# Patient Record
Sex: MALE | Race: WHITE
[De-identification: names, ages, dates, MRNs, and addresses within clinical notes are randomized per-mention and may not be internally consistent; named-entity substitution may affect disease eponyms.]

---

## 2017-01-21 LAB — PSA SERPL-MCNC: 4.3

## 2017-02-06 ENCOUNTER — APPOINTMENT (OUTPATIENT)
Dept: UROLOGY | Facility: CLINIC | Age: 62
End: 2017-02-06

## 2017-02-06 VITALS — SYSTOLIC BLOOD PRESSURE: 118 MMHG | DIASTOLIC BLOOD PRESSURE: 78 MMHG | HEART RATE: 83 BPM | TEMPERATURE: 98.1 F

## 2017-02-06 DIAGNOSIS — E29.1 TESTICULAR HYPOFUNCTION: ICD-10-CM

## 2017-02-11 ENCOUNTER — INPATIENT (INPATIENT)
Facility: HOSPITAL | Age: 62
LOS: 1 days | Discharge: HOME | End: 2017-02-13
Attending: FAMILY MEDICINE | Admitting: INTERNAL MEDICINE
Payer: COMMERCIAL

## 2017-02-11 VITALS
HEART RATE: 81 BPM | TEMPERATURE: 98 F | SYSTOLIC BLOOD PRESSURE: 145 MMHG | DIASTOLIC BLOOD PRESSURE: 97 MMHG | OXYGEN SATURATION: 98 % | RESPIRATION RATE: 18 BRPM

## 2017-02-11 LAB
ALBUMIN SERPL ELPH-MCNC: 4 G/DL — SIGNIFICANT CHANGE UP (ref 3.3–5)
ALP SERPL-CCNC: 66 U/L — SIGNIFICANT CHANGE UP (ref 40–120)
ALT FLD-CCNC: 62 U/L — SIGNIFICANT CHANGE UP (ref 12–78)
ANION GAP SERPL CALC-SCNC: 8 MMOL/L — SIGNIFICANT CHANGE UP (ref 5–17)
APTT BLD: 32.5 SEC — SIGNIFICANT CHANGE UP (ref 27.5–37.4)
AST SERPL-CCNC: 57 U/L — HIGH (ref 15–37)
BASOPHILS # BLD AUTO: 0.1 K/UL — SIGNIFICANT CHANGE UP (ref 0–0.2)
BASOPHILS NFR BLD AUTO: 1.4 % — SIGNIFICANT CHANGE UP (ref 0–2)
BILIRUB SERPL-MCNC: 0.8 MG/DL — SIGNIFICANT CHANGE UP (ref 0.2–1.2)
BUN SERPL-MCNC: 14 MG/DL — SIGNIFICANT CHANGE UP (ref 7–23)
CALCIUM SERPL-MCNC: 9.3 MG/DL — SIGNIFICANT CHANGE UP (ref 8.5–10.1)
CHLORIDE SERPL-SCNC: 103 MMOL/L — SIGNIFICANT CHANGE UP (ref 96–108)
CK SERPL-CCNC: 767 U/L — HIGH (ref 26–308)
CO2 SERPL-SCNC: 29 MMOL/L — SIGNIFICANT CHANGE UP (ref 22–31)
CREAT SERPL-MCNC: 1.15 MG/DL — SIGNIFICANT CHANGE UP (ref 0.5–1.3)
EOSINOPHIL # BLD AUTO: 0.4 K/UL — SIGNIFICANT CHANGE UP (ref 0–0.5)
EOSINOPHIL NFR BLD AUTO: 4.5 % — SIGNIFICANT CHANGE UP (ref 0–6)
GLUCOSE SERPL-MCNC: 99 MG/DL — SIGNIFICANT CHANGE UP (ref 70–99)
HCT VFR BLD CALC: 47.1 % — SIGNIFICANT CHANGE UP (ref 39–50)
HGB BLD-MCNC: 16.7 G/DL — SIGNIFICANT CHANGE UP (ref 13–17)
INR BLD: 1.18 RATIO — HIGH (ref 0.88–1.16)
LIDOCAIN IGE QN: 165 U/L — SIGNIFICANT CHANGE UP (ref 73–393)
LYMPHOCYTES # BLD AUTO: 2.5 K/UL — SIGNIFICANT CHANGE UP (ref 1–3.3)
LYMPHOCYTES # BLD AUTO: 27 % — SIGNIFICANT CHANGE UP (ref 13–44)
MANUAL DIF COMMENT BLD-IMP: SIGNIFICANT CHANGE UP
MCHC RBC-ENTMCNC: 30.8 PG — SIGNIFICANT CHANGE UP (ref 27–34)
MCHC RBC-ENTMCNC: 35.4 GM/DL — SIGNIFICANT CHANGE UP (ref 32–36)
MCV RBC AUTO: 87 FL — SIGNIFICANT CHANGE UP (ref 80–100)
MONOCYTES # BLD AUTO: 1.5 K/UL — HIGH (ref 0–0.9)
MONOCYTES NFR BLD AUTO: 16.4 % — HIGH (ref 2–14)
NEUTROPHILS # BLD AUTO: 4.7 K/UL — SIGNIFICANT CHANGE UP (ref 1.8–7.4)
NEUTROPHILS NFR BLD AUTO: 50.7 % — SIGNIFICANT CHANGE UP (ref 43–77)
NT-PROBNP SERPL-SCNC: 169 PG/ML — HIGH (ref 0–125)
PLAT MORPH BLD: NORMAL — SIGNIFICANT CHANGE UP
PLATELET # BLD AUTO: 222 K/UL — SIGNIFICANT CHANGE UP (ref 150–400)
POTASSIUM SERPL-MCNC: 3.8 MMOL/L — SIGNIFICANT CHANGE UP (ref 3.5–5.3)
POTASSIUM SERPL-SCNC: 3.8 MMOL/L — SIGNIFICANT CHANGE UP (ref 3.5–5.3)
PROT SERPL-MCNC: 7.5 GM/DL — SIGNIFICANT CHANGE UP (ref 6–8.3)
PROTHROM AB SERPL-ACNC: 13 SEC — SIGNIFICANT CHANGE UP (ref 10–13.1)
RBC # BLD: 5.42 M/UL — SIGNIFICANT CHANGE UP (ref 4.2–5.8)
RBC # FLD: 12 % — SIGNIFICANT CHANGE UP (ref 10.3–14.5)
RBC BLD AUTO: NORMAL — SIGNIFICANT CHANGE UP
SODIUM SERPL-SCNC: 140 MMOL/L — SIGNIFICANT CHANGE UP (ref 135–145)
TROPONIN I SERPL-MCNC: <0.015 NG/ML — SIGNIFICANT CHANGE UP (ref 0.01–0.04)
WBC # BLD: 9.3 K/UL — SIGNIFICANT CHANGE UP (ref 3.8–10.5)
WBC # FLD AUTO: 9.3 K/UL — SIGNIFICANT CHANGE UP (ref 3.8–10.5)

## 2017-02-11 PROCEDURE — 99285 EMERGENCY DEPT VISIT HI MDM: CPT

## 2017-02-11 PROCEDURE — 71020: CPT | Mod: 26

## 2017-02-11 PROCEDURE — 93010 ELECTROCARDIOGRAM REPORT: CPT

## 2017-02-11 RX ORDER — SODIUM CHLORIDE 9 MG/ML
3 INJECTION INTRAMUSCULAR; INTRAVENOUS; SUBCUTANEOUS ONCE
Qty: 0 | Refills: 0 | Status: COMPLETED | OUTPATIENT
Start: 2017-02-11 | End: 2017-02-11

## 2017-02-11 RX ORDER — ASPIRIN/CALCIUM CARB/MAGNESIUM 324 MG
325 TABLET ORAL ONCE
Qty: 0 | Refills: 0 | Status: COMPLETED | OUTPATIENT
Start: 2017-02-11 | End: 2017-02-11

## 2017-02-11 RX ORDER — NITROGLYCERIN 6.5 MG
0.5 CAPSULE, EXTENDED RELEASE ORAL ONCE
Qty: 0 | Refills: 0 | Status: COMPLETED | OUTPATIENT
Start: 2017-02-11 | End: 2017-02-11

## 2017-02-11 RX ADMIN — SODIUM CHLORIDE 3 MILLILITER(S): 9 INJECTION INTRAMUSCULAR; INTRAVENOUS; SUBCUTANEOUS at 23:24

## 2017-02-11 RX ADMIN — Medication 325 MILLIGRAM(S): at 23:24

## 2017-02-11 NOTE — ED STATDOCS - DETAILS:
I, Gloria Grady, performed the initial face to face bedside interview with this patient regarding history of present illness, review of symptoms and relevant past medical, social and family history.  I completed an independent physical examination.  I was the initial provider who evaluated this patient. I have signed out the follow up of any pending tests (i.e. labs, radiological studies) to the ACP.  I have communicated the patient’s plan of care and disposition with the ACP Gadit.  The history, relevant review of systems, past medical and surgical history, medical decision making, and physical examination was documented by the scribe in my presence and I attest to the accuracy of the documentation.

## 2017-02-11 NOTE — ED STATDOCS - PROGRESS NOTE DETAILS
After speaking again with Hospitalist Dr. Holder, heparin drip will not be initiated unless there is recurrence of chest pain or active chest pain or lab abnormality. HALIE Xiao reviewed the H&P and plan by Dr. Grady.    PA NOTE: Pt seen by intake physician and hpi/orders/plan reviewed. PT presenting to ED with complaints of chest pain. Pt had negative stress test last week at Dr. Seo's office.   PE: GEN: Awake, alert,  NAD,  EYES: PERRL CARDIAC: Reg rate and rhythm, S1,S2, RRR  RESP: No distress noted. Lungs CTA bilaterally no wheeze, ronchi, rales. ABD: soft,  non-tender, no guarding. . NEURO: AOx3, no focal deficits   PLAN: labs/EKG/CXR and admit. Pt aware of the admission.

## 2017-02-11 NOTE — ED STATDOCS - CHPI ED SYMPTOM NEG
no vomiting/no syncope/no nausea/no fever/no back pain/no diaphoresis/no palpitations/no dizziness/no shortness of breath

## 2017-02-11 NOTE — ED ADULT NURSE NOTE - ED STAT RN HANDOFF DETAILS
bedside report received by NINI Vick.  Pt is AAOx4 , currently denies chest pain.  Awaiting telemetry orders.

## 2017-02-11 NOTE — ED ADULT NURSE REASSESSMENT NOTE - NS ED NURSE REASSESS COMMENT FT1
Pt is an A&O x 4 male presents to ED c/o "chest heaviness", states he is scheduled for cardiac stents in 3 weeks. Placed on cardiac monitor. EKG completed. SL initiated, labs drawn. Pt appears in NAD. Color good. Sent for xray. Will continue to monitor

## 2017-02-11 NOTE — ED ADULT NURSE NOTE - CHPI ED SYMPTOMS NEG
no cough/no diaphoresis/no dizziness/no nausea/no vomiting/no chills/no fever/no shortness of breath/no syncope/no back pain

## 2017-02-11 NOTE — ED STATDOCS - OBJECTIVE STATEMENT
60 y/o M with hx of HTN on metoprolol and amlodipine presents to ED for evaluation of chest pain. Pt reports recent stress test by Dr Seo last week who wanted to schedule pt for angiogram and possible cardiac stent. Pt was instructed to come to ED if he experiences any symptoms. Pt reports shoveling snow yesterday. He took a nap earlier today and when he woke up, he noted chest pain. Pt reports URI symptoms including cough and congestion since January. No calf swelling/ pain. No recent travel. 60 y/o M with hx of HTN on metoprolol and amlodipine presents to ED for evaluation of chest pain. Pt reports recent abnormal stress test by Dr Seo last week who wanted to schedule pt for angiogram and possible cardiac stent. Pt was instructed to come to ED if he experiences any symptoms. He took a nap earlier today and when he woke up, he noted chest discomfort. Pt reports shoveling snow yesterday. Pt reports URI symptoms including cough and congestion since January. No calf swelling/ pain. No recent travel.

## 2017-02-12 DIAGNOSIS — I10 ESSENTIAL (PRIMARY) HYPERTENSION: ICD-10-CM

## 2017-02-12 DIAGNOSIS — Z90.49 ACQUIRED ABSENCE OF OTHER SPECIFIED PARTS OF DIGESTIVE TRACT: Chronic | ICD-10-CM

## 2017-02-12 DIAGNOSIS — Z98.890 OTHER SPECIFIED POSTPROCEDURAL STATES: Chronic | ICD-10-CM

## 2017-02-12 DIAGNOSIS — I20.0 UNSTABLE ANGINA: ICD-10-CM

## 2017-02-12 LAB
ALBUMIN SERPL ELPH-MCNC: 3.9 G/DL — SIGNIFICANT CHANGE UP (ref 3.3–5)
ALP SERPL-CCNC: 56 U/L — SIGNIFICANT CHANGE UP (ref 40–120)
ALT FLD-CCNC: 61 U/L — SIGNIFICANT CHANGE UP (ref 12–78)
ANION GAP SERPL CALC-SCNC: 9 MMOL/L — SIGNIFICANT CHANGE UP (ref 5–17)
AST SERPL-CCNC: 49 U/L — HIGH (ref 15–37)
BILIRUB SERPL-MCNC: 1 MG/DL — SIGNIFICANT CHANGE UP (ref 0.2–1.2)
BUN SERPL-MCNC: 14 MG/DL — SIGNIFICANT CHANGE UP (ref 7–23)
CALCIUM SERPL-MCNC: 8.9 MG/DL — SIGNIFICANT CHANGE UP (ref 8.5–10.1)
CHLORIDE SERPL-SCNC: 104 MMOL/L — SIGNIFICANT CHANGE UP (ref 96–108)
CHOLEST SERPL-MCNC: 130 MG/DL — SIGNIFICANT CHANGE UP (ref 10–199)
CK SERPL-CCNC: 633 U/L — HIGH (ref 26–308)
CO2 SERPL-SCNC: 29 MMOL/L — SIGNIFICANT CHANGE UP (ref 22–31)
CREAT SERPL-MCNC: 1.06 MG/DL — SIGNIFICANT CHANGE UP (ref 0.5–1.3)
GLUCOSE SERPL-MCNC: 84 MG/DL — SIGNIFICANT CHANGE UP (ref 70–99)
HDLC SERPL-MCNC: 30 MG/DL — LOW (ref 40–125)
LIPID PNL WITH DIRECT LDL SERPL: 80 MG/DL — SIGNIFICANT CHANGE UP
MAGNESIUM SERPL-MCNC: 2.3 MG/DL — SIGNIFICANT CHANGE UP (ref 1.8–2.4)
PHOSPHATE SERPL-MCNC: 2.6 MG/DL — SIGNIFICANT CHANGE UP (ref 2.5–4.5)
POTASSIUM SERPL-MCNC: 3.5 MMOL/L — SIGNIFICANT CHANGE UP (ref 3.5–5.3)
POTASSIUM SERPL-SCNC: 3.5 MMOL/L — SIGNIFICANT CHANGE UP (ref 3.5–5.3)
PROT SERPL-MCNC: 7.4 GM/DL — SIGNIFICANT CHANGE UP (ref 6–8.3)
SODIUM SERPL-SCNC: 142 MMOL/L — SIGNIFICANT CHANGE UP (ref 135–145)
TOTAL CHOLESTEROL/HDL RATIO MEASUREMENT: 4.3 RATIO — SIGNIFICANT CHANGE UP (ref 3.4–9.6)
TRIGL SERPL-MCNC: 100 MG/DL — SIGNIFICANT CHANGE UP (ref 10–149)
TROPONIN I SERPL-MCNC: <0.015 NG/ML — SIGNIFICANT CHANGE UP (ref 0.01–0.04)

## 2017-02-12 PROCEDURE — 93010 ELECTROCARDIOGRAM REPORT: CPT

## 2017-02-12 RX ORDER — CLOPIDOGREL BISULFATE 75 MG/1
75 TABLET, FILM COATED ORAL DAILY
Qty: 0 | Refills: 0 | Status: DISCONTINUED | OUTPATIENT
Start: 2017-02-12 | End: 2017-02-13

## 2017-02-12 RX ORDER — METOPROLOL TARTRATE 50 MG
100 TABLET ORAL
Qty: 0 | Refills: 0 | Status: DISCONTINUED | OUTPATIENT
Start: 2017-02-12 | End: 2017-02-13

## 2017-02-12 RX ORDER — AMLODIPINE BESYLATE 2.5 MG/1
5 TABLET ORAL DAILY
Qty: 0 | Refills: 0 | Status: DISCONTINUED | OUTPATIENT
Start: 2017-02-12 | End: 2017-02-13

## 2017-02-12 RX ORDER — CLOPIDOGREL BISULFATE 75 MG/1
300 TABLET, FILM COATED ORAL ONCE
Qty: 0 | Refills: 0 | Status: COMPLETED | OUTPATIENT
Start: 2017-02-12 | End: 2017-02-12

## 2017-02-12 RX ORDER — ASPIRIN/CALCIUM CARB/MAGNESIUM 324 MG
325 TABLET ORAL DAILY
Qty: 0 | Refills: 0 | Status: DISCONTINUED | OUTPATIENT
Start: 2017-02-12 | End: 2017-02-13

## 2017-02-12 RX ORDER — ATORVASTATIN CALCIUM 80 MG/1
80 TABLET, FILM COATED ORAL AT BEDTIME
Qty: 0 | Refills: 0 | Status: DISCONTINUED | OUTPATIENT
Start: 2017-02-12 | End: 2017-02-13

## 2017-02-12 RX ORDER — TRIAMTERENE/HYDROCHLOROTHIAZID 75 MG-50MG
1 TABLET ORAL DAILY
Qty: 0 | Refills: 0 | Status: DISCONTINUED | OUTPATIENT
Start: 2017-02-12 | End: 2017-02-13

## 2017-02-12 RX ADMIN — CLOPIDOGREL BISULFATE 300 MILLIGRAM(S): 75 TABLET, FILM COATED ORAL at 04:10

## 2017-02-12 RX ADMIN — Medication 1 TABLET(S): at 06:22

## 2017-02-12 RX ADMIN — ATORVASTATIN CALCIUM 80 MILLIGRAM(S): 80 TABLET, FILM COATED ORAL at 04:10

## 2017-02-12 RX ADMIN — Medication 100 MILLIGRAM(S): at 06:21

## 2017-02-12 RX ADMIN — Medication 325 MILLIGRAM(S): at 11:23

## 2017-02-12 RX ADMIN — Medication 100 MILLIGRAM(S): at 17:43

## 2017-02-12 RX ADMIN — CLOPIDOGREL BISULFATE 75 MILLIGRAM(S): 75 TABLET, FILM COATED ORAL at 11:23

## 2017-02-12 RX ADMIN — AMLODIPINE BESYLATE 5 MILLIGRAM(S): 2.5 TABLET ORAL at 06:06

## 2017-02-12 RX ADMIN — ATORVASTATIN CALCIUM 80 MILLIGRAM(S): 80 TABLET, FILM COATED ORAL at 22:26

## 2017-02-12 NOTE — CONSULT NOTE ADULT - ASSESSMENT
SOB-With recent abnormal stress test,  For tenative left heart cath tomorrow. Awaiting Dr Seo's input. NPO PMN.    HTN- continue current meds.  Thank you for allowing me to participate in the care of this patient. Please feel free to contact me with any questions.

## 2017-02-12 NOTE — PROGRESS NOTE ADULT - PROBLEM SELECTOR PLAN 1
- Continue Telemetry  - Cardiology consult with Dr. Seo, discussed with Dr. Contreras as she was covering  - NPO after midnight for possible Cath  -neg trops x3   - EKG: T wave inversions, No ST changes  - ASA, Plavix, Statin, BB  - CPK: 767  - 1st Trop <0.015  - Trend troponin and serial ekg  check lipids

## 2017-02-12 NOTE — PROGRESS NOTE ADULT - PROBLEM SELECTOR PLAN 2
Cont Metoprolol 100mg PO BID  Cont Amlodipine 5mg PO daily  Cont Dyazide 37.5/25mg daily  Monitor BP.

## 2017-02-12 NOTE — PATIENT PROFILE ADULT. - PSH
History of cholecystectomy    History of inguinal hernia repair    S/P partial colectomy  for diverticulitis

## 2017-02-12 NOTE — H&P ADULT - HISTORY OF PRESENT ILLNESS
62 YO M with PMHx of HTN, presenting with "chest tightness".  Patient reports that he was shoveling snow, then he came home and took a nap.  He woke up feeling strange and felt some chest tightness that felt unusual to him.  He recently had seen Dr. Seo for stress test that was abnormal last week and was supposed to see Dr. Seo on Monday to discuss the need for angio with Cath and stent placement.  Patient was advised to come to the ED if he had felt anything abnormal. Patient denies shortness of breath but does report UTI symptoms since new years which have been improving.  Denies palpiations, jaw or arm pain/numbness, abdominal pain, headache.      In the ED, patient received ASA 325mg x1, Nitro Ointment x1.     2/12 - Patient seen and examined in the ED.  Patient is comfortable, NAD.  He does not have anymore chest tightness feeling.  He does not have any complaints at this time. 60 YO M with PMHx of HTN, presenting with "chest tightness".  Patient reports that he was shoveling snow yesterday with no discomfort, today he came home and took a nap.  When he woke up, he reports feeling strange and felt some chest tightness that felt unusual to him. Patient cannot specify how long the sensation lasted. He recently had seen Dr. Seo for stress test that was abnormal last week and was supposed to see Dr. Seo on Monday to discuss the need for angio with Cath and stent placement.  Patient was advised to come to the ED if he had felt anything abnormal. Patient denies shortness of breath but does report UTI symptoms since new years which have been improving.  Denies palpiations, jaw or arm pain/numbness, abdominal pain, headache.      In the ED, patient received ASA 325mg x1, Nitro Ointment x1.     2/12 - Patient seen and examined in the ED.  Patient is comfortable, NAD.  He does not have anymore chest tightness feeling.  He does not have any complaints at this time. 62 YO M with PMHx of HTN, presenting with "chest tightness".      Patient reports that he was shoveling snow yesterday with no discomfort, today he came home and took a nap.  When he woke up, he reports feeling strange and felt some chest tightness that felt unusual to him. Patient cannot specify how long the sensation lasted.  Graded as 1/10  cannot tell me what increases or decreases this discomfort.  Cannot tell me how long the sensation lasted. + associated mild SOB   No radiation , no palpitations and no diaphoresis  .  He recently had seen Dr. Seo for stress test that was abnormal last week ( persantine stress) and was supposed to see Dr. Seo on Monday to discuss the need for angio with Cath and stent placement.  Patient was advised to come to the ED if he had felt anything abnormal. Patient denies shortness of breath but does report URI symptoms since new years which have been improving.  Denies palpitations, jaw or arm pain/numbness, abdominal pain, headache.      In the ED, patient received ASA 325mg x1, Nitro Ointment x1.     2/12 - Patient seen and examined in the ED.  Patient is comfortable, NAD.  He does not have anymore chest tightness feeling.  He does not have any complaints at this time.

## 2017-02-12 NOTE — PROGRESS NOTE ADULT - PROBLEM SELECTOR PLAN 2
.  - Cont Metoprolol 100mg PO BID  - Cont Amlodipine 5mg PO daily  - Cont Dyazide 37.5/25mg daily  - Monitor BP

## 2017-02-12 NOTE — H&P ADULT - PROBLEM SELECTOR PLAN 1
- Admit to Telemetry  - Cardiology consult with Dr. Seo  - NPO after midnight for possible Cath  - EKG: T wave inversions, No ST changes  - ASA, Plavix, Statin, BB  - CPK: 767  - 1st Trop <0.015  - Trend troponin .  - Admit to Telemetry  - Cardiology consult with Dr. Seo  - NPO after midnight for possible Cath  - EKG: T wave inversions, No ST changes  - ASA, Plavix, Statin, BB  - CPK: 767  - 1st Trop <0.015  - Trend troponin .  - Admit to Telemetry  - Cardiology consult with Dr. Seo  - NPO after midnight for possible Cath  - EKG: T wave inversions, No ST changes  - ASA, Plavix, Statin, BB  - CPK: 767  - 1st Trop <0.015  - Trend troponin and serial ekg  check lipids

## 2017-02-12 NOTE — H&P ADULT - NSHPSOCIALHISTORY_GEN_ALL_CORE
Former smoker 1PPD for 5 years, quit long time ago.    Social drinker.  No Drugs. Former smoker 1PPD for 5 years, quit long time ago.    Social drinker.  No Drugs.    works on airplanes at the [employer]

## 2017-02-12 NOTE — H&P ADULT - NSHPLABSRESULTS_GEN_ALL_CORE
EKG NSR no acute ST-T changes when I compared to ekgs 2013 and 2012    CXR PA and lat  normal heart size and JAMES (unofficial)

## 2017-02-12 NOTE — PATIENT PROFILE ADULT. - FAMILY HISTORY
Father  Still living? No  Family history of cardiac disorder in father, Age at diagnosis: 51-60  Family history of diabetes mellitus in father, Age at diagnosis: Age Unknown

## 2017-02-12 NOTE — H&P ADULT - ATTENDING COMMENTS
62 YO M with PMHx of HTN, presenting with "chest tightness".      +shoveling snow yesterday with no discomfort, today he came home and took a nap.  When he woke up, felt some chest tightness that felt unusual to him. Patient cannot specify how long the sensation lasted.  Graded as 1/10  cannot tell me what increases or decreases this discomfort.  Cannot tell me how long the sensation lasted. + associated mild SOB   No radiation , no palpitations and no diaphoresis  Aware of + persantine stress recently.  ROS as above  Hemodynamics as indicated in PE  Pt appears comfortable  HEENT no asym, pupils reactive  Neck supple, no JVD  Chest clear = BS   Cor RR S1+S2   Ab + BS soft  extrem no edema.   skin warm and dry    labs, ekg and CXR reviewed w Dr. Montes.  Agree w A/P as outlined.  Will likely need cath to define anatomy.

## 2017-02-12 NOTE — H&P ADULT - FAMILY HISTORY
Father  Still living? No  Family history of cardiac disorder in father, Age at diagnosis: Age Unknown  Family history of diabetes mellitus in father, Age at diagnosis: Age Unknown Father  Still living? No  Family history of cardiac disorder in father, Age at diagnosis: 51-60  Family history of diabetes mellitus in father, Age at diagnosis: Age Unknown

## 2017-02-12 NOTE — PROGRESS NOTE ADULT - PROBLEM SELECTOR PLAN 1
Continue Telemetry  Cardiology consult with Dr. Seo  NPO after midnight for possible Cath  -neg trops x3   ASA, Plavix, Statin, BB  CPK: trending down

## 2017-02-12 NOTE — H&P ADULT - PROBLEM SELECTOR PLAN 2
- Cont Metoprolol 100mg PO BID  - Cont Amlodipine 5mg PO daily  - Cont Dyazide 37.5/25mg daily  - Monitor BP .  - Cont Metoprolol 100mg PO BID  - Cont Amlodipine 5mg PO daily  - Cont Dyazide 37.5/25mg daily  - Monitor BP

## 2017-02-12 NOTE — CONSULT NOTE ADULT - SUBJECTIVE AND OBJECTIVE BOX
Patient is a 61y old  Male who presents with a chief complaint of chest tightness while shoveling snow (2017 05:25)      HPI:  60 YO M with PMHx of HTN, presenting with "chest tightness".      Patient reports that he was shoveling snow the day before with no discomfort, and he came home and took a nap.  When he woke up, he reports feeling strange and felt some chest tightness that felt unusual to him. Patient cannot specify how long the sensation lasted.  Graded as 1/10  cannot tell me what increases or decreases this discomfort.  Cannot tell me how long the sensation lasted. + associated mild SOB   No radiation , no palpitations and no diaphoresis  .  He recently had seen Dr. Seo for stress test that was abnormal last week ( persantine stress) and was supposed to see Dr. Seo on Monday to discuss the need for angio with Cath and stent placement.  Patient was advised to come to the ED if he had felt anything abnormal. Patient denies shortness of breath but does report URI symptoms since new years which have been improving.  Denies palpitations, jaw or arm pain/numbness, abdominal pain, headache.      In the ED, patient received ASA 325mg x1, Nitro Ointment x1.      - Pt denies any recurrence of his symptoms.      PAST MEDICAL & SURGICAL HISTORY:  Diverticulitis  HTN (hypertension)  History of inguinal hernia repair  S/P partial colectomy: for diverticulitis  History of cholecystectomy          MEDICATIONS  (STANDING):  triamterene 37.5 mG/hydrochlorothiazide 25 mG Tablet 1Tablet(s) Oral daily  metoprolol 100milliGRAM(s) Oral two times a day  amLODIPine   Tablet 5milliGRAM(s) Oral daily  aspirin 325milliGRAM(s) Oral daily  clopidogrel Tablet 75milliGRAM(s) Oral daily  atorvastatin 80milliGRAM(s) Oral at bedtime    MEDICATIONS  (PRN):      FAMILY HISTORY:  Family history of diabetes mellitus in father (Father)  Family history of cardiac disorder in father (Father):  age 58      SOCIAL HISTORY:  quit smoking many yrs ago    CIGARETTES:    ALCOHOL:    REVIEW OF SYSTEMS:  CONSTITUTIONAL:  No night sweats.  No fatigue, malaise, lethargy.  No fever or chills.  HEENT:  Eyes:  No visual changes.  No eye pain.  No eye discharge.    ENT:  No runny nose.  No epistaxis.  No sinus pain.  No sore throat.  No odynophagia.  No ear pain.  No congestion.  RESPIRATORY:  No cough.  No wheeze.  No hemoptysis.  No shortness of breath.  CARDIOVASCULAR:  No chest pains.  No palpitations. No shortness of breath, orthopnea or PND.  GASTROINTESTINAL:  No abdominal pain.  No nausea or vomiting.  No diarrhea or constipation.  No hematemesis.  No hematochezia.  No melena.  GENITOURINARY:  No urgency.  No frequency.  No dysuria.  No hematuria.  No obstructive symptoms.  No discharge.  No pain.  No significant abnormal bleeding.  MUSCULOSKELETAL:  No musculoskeletal pain.  No joint swelling.  No arthritis.  NEUROLOGICAL:  No tingling or numbness or weakness.  PSYCHIATRIC:  No confusion  SKIN:  No rashes.  No lesions.  No wounds.  ENDOCRINE:  No unexplained weight loss.  No polydipsia.  No polyuria.  No polyphagia.  HEMATOLOGIC:  No anemia.  No purpura.  No petechiae.  No prolonged or excessive bleeding.   ALLERGIC AND IMMUNOLOGIC:  No pruritus.  No swelling.         Vital Signs Last 24 Hrs  T(C): 36.2, Max: 36.8 (02-12 @ 03:58)  T(F): 97.2, Max: 98.2 (02-12 @ 03:58)  HR: 70 (67 - 81)  BP: 130/91 (107/77 - 145/97)  BP(mean): --  RR: 16 (16 - 18)  SpO2: 96% (96% - 100%)    PHYSICAL EXAM-    Constitutional: The patient appears to be normal, well developed, well nourished and alert and oriented to time, place and person. The patient does not appear acutely ill. The patient is alert.     Head: Head is normocephalic and atraumatic.      Neck: The patient's neck is supple without enlargement, has no palpable thyromegaly nor thyroid nodules and has no jugular venous distention. No audible carotid bruits. There are strong carotid pulses bilaterally. No JVD.     Cardiovascular: Regular rate and rhythm without S3, S4. No murmurs or rubs are appreciated.      Respiratory: The breath sounds in the left lung field are diminished through out. The breath sounds in the right lung field are diminished through out. The patient has no rales and no rhonchi. The patient has no wheezes.     Abdomen: Soft, nontender, nondistended with positive bowel sounds.      Extremity: No tenderness. There is no pitting edema, skin discoloration, clubbing and cyanosis.     Neurologic: The patient is alert and oriented.      Skin: No rash, no obvious lesions noted.      Psychiatric: The patient appears to be emotionally stable.      INTERPRETATION OF TELEMETRY:    ECG:  sinus rythm, L axis, no st t changes, normal intervals.  I&O's Detail      LABS:                        16.7   9.3   )-----------( 222      ( 2017 21:50 )             47.1     2017 08:21    142    |  104    |  14     ----------------------------<  84     3.5     |  29     |  1.06     Ca    8.9        2017 08:21  Phos  2.6       2017 08:21  Mg     2.3       2017 08:21    TPro  7.4    /  Alb  3.9    /  TBili  1.0    /  DBili  x      /  AST  49     /  ALT  61     /  AlkPhos  56     2017 08:21    CARDIAC MARKERS ( 2017 08:21 )  <0.015 ng/mL / x     / 465 U/L / x     / x      CARDIAC MARKERS ( 2017 01:24 )  <0.015 ng/mL / x     / 633 U/L / x     / x      CARDIAC MARKERS ( 2017 21:50 )  <0.015 ng/mL / x     / 767 U/L / x     / x          PT/INR - ( 2017 21:50 )   PT: 13.0 sec;   INR: 1.18 ratio         PTT - ( 2017 21:50 )  PTT:32.5 sec    I&O's Summary    BNPSerum Pro-Brain Natriuretic Peptide: 169 pg/mL ( @ 21:50)    RADIOLOGY & ADDITIONAL STUDIES:

## 2017-02-12 NOTE — H&P ADULT - PSH
History of cholecystectomy    S/P partial colectomy  for diverticulitis History of cholecystectomy    History of inguinal hernia repair    S/P partial colectomy  for diverticulitis

## 2017-02-12 NOTE — H&P ADULT - NSHPPHYSICALEXAM_GEN_ALL_CORE
Vital Signs Last 24 Hrs  T(C): 36.5, Max: 36.5 (02-11 @ 21:06)  T(F): 97.7, Max: 97.7 (02-11 @ 21:06)  HR: 69 (69 - 81)  BP: 107/77 (107/77 - 145/97)  BP(mean): --  RR: 18 (18 - 18)  SpO2: 97% (97% - 98%)Vital Signs Last 24 Hrs  T(C): 36.5, Max: 36.5 (02-11 @ 21:06)

## 2017-02-13 ENCOUNTER — TRANSCRIPTION ENCOUNTER (OUTPATIENT)
Age: 62
End: 2017-02-13

## 2017-02-13 VITALS
HEART RATE: 65 BPM | RESPIRATION RATE: 18 BRPM | DIASTOLIC BLOOD PRESSURE: 80 MMHG | SYSTOLIC BLOOD PRESSURE: 124 MMHG | OXYGEN SATURATION: 96 % | WEIGHT: 210.1 LBS

## 2017-02-13 LAB
ANION GAP SERPL CALC-SCNC: 6 MMOL/L — SIGNIFICANT CHANGE UP (ref 5–17)
BUN SERPL-MCNC: 15 MG/DL — SIGNIFICANT CHANGE UP (ref 7–23)
CALCIUM SERPL-MCNC: 8.9 MG/DL — SIGNIFICANT CHANGE UP (ref 8.5–10.1)
CHLORIDE SERPL-SCNC: 104 MMOL/L — SIGNIFICANT CHANGE UP (ref 96–108)
CO2 SERPL-SCNC: 31 MMOL/L — SIGNIFICANT CHANGE UP (ref 22–31)
CREAT SERPL-MCNC: 0.98 MG/DL — SIGNIFICANT CHANGE UP (ref 0.5–1.3)
GLUCOSE SERPL-MCNC: 92 MG/DL — SIGNIFICANT CHANGE UP (ref 70–99)
HCT VFR BLD CALC: 49.7 % — SIGNIFICANT CHANGE UP (ref 39–50)
HGB BLD-MCNC: 17.2 G/DL — HIGH (ref 13–17)
MCHC RBC-ENTMCNC: 30.5 PG — SIGNIFICANT CHANGE UP (ref 27–34)
MCHC RBC-ENTMCNC: 34.6 GM/DL — SIGNIFICANT CHANGE UP (ref 32–36)
MCV RBC AUTO: 88.2 FL — SIGNIFICANT CHANGE UP (ref 80–100)
PLATELET # BLD AUTO: 192 K/UL — SIGNIFICANT CHANGE UP (ref 150–400)
POTASSIUM SERPL-MCNC: 4.2 MMOL/L — SIGNIFICANT CHANGE UP (ref 3.5–5.3)
POTASSIUM SERPL-SCNC: 4.2 MMOL/L — SIGNIFICANT CHANGE UP (ref 3.5–5.3)
RBC # BLD: 5.63 M/UL — SIGNIFICANT CHANGE UP (ref 4.2–5.8)
RBC # FLD: 12.2 % — SIGNIFICANT CHANGE UP (ref 10.3–14.5)
SODIUM SERPL-SCNC: 141 MMOL/L — SIGNIFICANT CHANGE UP (ref 135–145)
WBC # BLD: 8.2 K/UL — SIGNIFICANT CHANGE UP (ref 3.8–10.5)
WBC # FLD AUTO: 8.2 K/UL — SIGNIFICANT CHANGE UP (ref 3.8–10.5)

## 2017-02-13 RX ORDER — ASPIRIN/CALCIUM CARB/MAGNESIUM 324 MG
1 TABLET ORAL
Qty: 30 | Refills: 0
Start: 2017-02-13 | End: 2017-03-15

## 2017-02-13 RX ORDER — ROSUVASTATIN CALCIUM 5 MG/1
1 TABLET ORAL
Qty: 30 | Refills: 0
Start: 2017-02-13 | End: 2017-03-15

## 2017-02-13 RX ADMIN — Medication 100 MILLIGRAM(S): at 06:32

## 2017-02-13 RX ADMIN — AMLODIPINE BESYLATE 5 MILLIGRAM(S): 2.5 TABLET ORAL at 06:32

## 2017-02-13 RX ADMIN — Medication 325 MILLIGRAM(S): at 12:11

## 2017-02-13 RX ADMIN — CLOPIDOGREL BISULFATE 75 MILLIGRAM(S): 75 TABLET, FILM COATED ORAL at 12:11

## 2017-02-13 NOTE — DISCHARGE NOTE ADULT - CARE PLAN
Principal Discharge DX:	Unstable angina  Goal:	prevention of chest pain  Instructions for follow-up, activity and diet:	low sodium, low fat diet. Start ASA 81 mg daily and Crestor 5 mg qHS  f/u with cardiology in 1-2 weeks  Secondary Diagnosis:	Essential hypertension  Goal:	BP management  Instructions for follow-up, activity and diet:	continue home BP medications as previously taken

## 2017-02-13 NOTE — DISCHARGE NOTE ADULT - HOSPITAL COURSE
60 y/o M presented with chaest tightness after having a positive stress test outpatient. His chest pressure resolved. Pt had angio done which showed LC small OM1 with 75% distal stenosis with need for medical managment only. Pt was started on ASA, Statin, to continue BB and f/u with cardio in 1-2 weeks

## 2017-02-13 NOTE — PROGRESS NOTE ADULT - PROBLEM SELECTOR PLAN 1
-Cath performed today, no stents, Single vessel disease in smaller  Left circumflex  -Discharge home today on statin, asa, bb  -neg trops x3   ASA, Plavix, Statin, BB  CPK: trending down

## 2017-02-13 NOTE — DISCHARGE NOTE ADULT - CARE PROVIDER_API CALL
Jamel Seo (MOLLY), Cardiovascular Disease; Critical Care Medicine; Internal Medicine; Interventional Cardiology  [address] [place] [address]  Phone: [phone]  Fax: [phone]

## 2017-02-13 NOTE — DISCHARGE NOTE ADULT - MEDICATION SUMMARY - MEDICATIONS TO CHANGE
I will SWITCH the dose or number of times a day I take the medications listed below when I get home from the hospital:    Norvasc  --  by mouth    metoprolol  --  by mouth    Dyazide  --  by mouth

## 2017-02-13 NOTE — DISCHARGE NOTE ADULT - PATIENT PORTAL LINK FT
“You can access the [facility] Patient Portal, offered by [facility], by registering with the following website: [email or web address]”

## 2017-02-13 NOTE — DISCHARGE NOTE ADULT - MEDICATION SUMMARY - MEDICATIONS TO TAKE
I will START or STAY ON the medications listed below when I get home from the hospital:    Aspir 81 oral delayed release tablet  -- 1 tab(s) by mouth once a day  -- Swallow whole.  Do not crush.  Take with food or milk.    -- Indication: For CHEST PAIN    Crestor 5 mg oral tablet  -- 1 tab(s) by mouth once a day (at bedtime)  -- Do not take dairy products, antacids, or iron preparations within one hour of this medication.  Do not take this drug if you are pregnant.  It is very important that you take or use this exactly as directed.  Do not skip doses or discontinue unless directed by your doctor.    -- Indication: For CHEST PAIN    Dyazide  --  by mouth   -- Indication: For HTN (hypertension)    metoprolol  --  by mouth   -- Indication: For HTN (hypertension)    Norvasc  --  by mouth   -- Indication: For HTN (hypertension)

## 2017-02-13 NOTE — DISCHARGE NOTE ADULT - PLAN OF CARE
prevention of chest pain low sodium, low fat diet. Start ASA 81 mg daily and Crestor 5 mg qHS  f/u with cardiology in 1-2 weeks BP management continue home BP medications as previously taken

## 2017-02-13 NOTE — PROGRESS NOTE ADULT - ASSESSMENT
SOB-With recent abnormal stress test,  For tenative left heart cath tomorrow. Awaiting Dr Seo's input. NPO PMN.    HTN- continue current meds.  Thank you for allowing me to participate in the care of this patient. Please feel free to contact me with any questions. SOB-With recent abnormal stress test, pt for left heart cath today.    HTN- continue current meds.  Thank you for allowing me to participate in the care of this patient. Please feel free to contact me with any questions.

## 2017-02-13 NOTE — CHART NOTE - NSCHARTNOTEFT_GEN_A_CORE
Cardiology NP  Nurse Practitioner Progress note:     HPI:  60 YO M with PMHx of HTN, presenting with "chest tightness".      Patient reports that he was shoveling snow yesterday with no discomfort, he came home and took a nap.  When he woke up, he reported feeling strange and felt some chest tightness that felt unusual to him.  Graded as 1/10  cannot tell what increases or decreases  discomfort and is associated with mild SOB.     .  Pt. recently had seen Dr. Seo for stress test that was abnormal last week (persantine stress) and was supposed to see Dr. Seo on Monday to discuss the need for angio with Cath and stent placement.  Patient was advised to come to the ED if he had felt anything abnormal.       T(C): 36.2, Max: 36.9 (02-12 @ 16:12)  HR: 68 (68 - 72)  BP: 127/85 (109/67 - 128/69)  RR: 18 (16 - 18)  SpO2: 98% (93% - 100%)  Wt(kg): --    PHYSICAL EXAM:  Neurologic: Non-focal, AxOx3.  No neuro deficits  Vascular: Peripheral pulses palpable 2+ bilaterally  Procedure Site: Rt. groin closure device noted site benign soft no bleeding no hematoma +2PP      PROCEDURE RESULTS: S/P LHC non-obstructive CAD    ASSESSMENT/PLAN: 	  -VS, labs, diet, activity as per post cath orders  -IV hydration  -Encourage PO fluids  -Continue current medications  -Plan of care D/W pt. and MD  -Post cath instructions reviewed with pt., pt. verbalizes and understands instructions  -Follow-up with attending

## 2017-02-17 DIAGNOSIS — R07.89 OTHER CHEST PAIN: ICD-10-CM

## 2017-02-17 DIAGNOSIS — I10 ESSENTIAL (PRIMARY) HYPERTENSION: ICD-10-CM

## 2017-02-17 DIAGNOSIS — I20.0 UNSTABLE ANGINA: ICD-10-CM

## 2017-02-21 DIAGNOSIS — I25.110 ATHEROSCLEROTIC HEART DISEASE OF NATIVE CORONARY ARTERY WITH UNSTABLE ANGINA PECTORIS: ICD-10-CM

## 2017-03-06 ENCOUNTER — APPOINTMENT (OUTPATIENT)
Dept: UROLOGY | Facility: CLINIC | Age: 62
End: 2017-03-06

## 2017-03-10 ENCOUNTER — APPOINTMENT (OUTPATIENT)
Dept: UROLOGY | Facility: CLINIC | Age: 62
End: 2017-03-10

## 2017-03-16 PROBLEM — I10 ESSENTIAL (PRIMARY) HYPERTENSION: Chronic | Status: ACTIVE | Noted: 2017-02-11

## 2017-03-27 ENCOUNTER — APPOINTMENT (OUTPATIENT)
Dept: UROLOGY | Facility: CLINIC | Age: 62
End: 2017-03-27

## 2017-04-12 NOTE — ED STATDOCS - RESPIRATORY, MLM
REASON FOR VISIT:    Chief Complaint   Patient presents with   • Gastro-intestinal Problem     Extended Follow UP- Medication Check Crohn's        LAST VISIT WITH ME:  Visit date not found    HISTORY OF PRESENT ILLNESS:    The patient is a very pleasant 28 year old male who is being seen by GI in a follow-up for Crohn disease. Patient has not been seen by GI since late 2014. When inquired, he reports that he has been feeling rather fatigued and having loose stools daily for past several years. This is occurring despite the use of Humira q. Weekly. He also used to take Apriso in the past but he stopped taking this medication about 2 years ago as he did not find to be beneficial. Patient had no blood tests or any follow-up visits in this regard since the end of 2014. He denies any black stools or any hematemesis. States that his appetite is stable but he has up to 10 loose stools daily, occasionally presenting with mucus but he denies any rectal bleeding. Patient states that he is \"ready to get involved\" with his own healthcare as he feels that his Crohn disease is not adequately controlled at this point, nor that it has been well controlled for the past several years in fact. His last colonoscopy was completed in early summer of 2012. Patient avoids any frequent use of NSAIDs. He has a history of Crohn's perianal fistula and abscess which was surgically treated in 2013. He denies any urgency to evacuate his bowels or any extra intestinal symptoms of IBD such as joint pain or joint swelling or any uveitis. He denies any recent weight loss or fevers or chills or other constitutional signs except for generalized fatigue and malaise. He has no history of ulcerative colitis, microscopic colitis, IBS, pancreatitis, exocrine diuretic insufficiency, gastritis, or any GI cancer. He also denies any family history of GERD, pancreatitis, celiac disease, IBS, IBD, or colon cancer.    I have reviewed the patient's medications and  allergies, past medical, surgical, social and family history, updating these as appropriate.  See Histories section of the electronic medical record for a display of this information.    PAST MEDICAL HISTORY:    Crohn's disease                                                 Comment: perianal fistula asnd abscess    PAST SURGICAL HISTORY:    RECTUM SURGERY PROCEDURE UNLISTED               06/28/2013      Comment: recurrent perirectal abscess, prior fistula in                [place], [place] placed    ANAL FISTULOTOMY                                05/03/2013    COLONOSCOPY W BIOPSY                            06/07/2012    Current Outpatient Prescriptions   Medication Sig   • HUMIRA PEN 40 MG/0.8ML pen-injector kit INJECT 0.8 ML UNDER THE SKIN ONCE A WEEK   • HYDROcodone-acetaminophen (NORCO) 5-325 MG per tablet 1-2 tabs every 4-6hrs PRN foot and ankle pain.   • mesalamine (APRISO) 0.375 G 24 hr capsule Take 4 capsules by mouth every morning.     No current facility-administered medications for this visit.        ALLERGIES:   Allergen Reactions   • Seasonal Other (See Comments)     Stuffy nose, itchy red eyes,sore throat       Family History   Problem Relation Age of Onset   • NEGATIVE FAMILY HX OF Mother    • NEGATIVE FAMILY HX OF Father        Social History     Social History   • Marital status:      Spouse name: N/A   • Number of children: N/A   • Years of education: N/A     Occupational History   • Not on file.     Social History Main Topics   • Smoking status: Never Smoker   • Smokeless tobacco: Never Used   • Alcohol use No   • Drug use: No   • Sexual activity: Not on file     Other Topics Concern   • Not on file     Social History Narrative       REVIEW OF SYSTEMS:  A complete review of systems was performed and found to be negative except for what was stated in the History of Present Illness.    PHYSICAL EXAM:  Vitals:  See VS section.   Constitutional: Patient is alert and oriented x 3, pleasant and  cooperative, in mild acute distress.   Skin: No jaundice on inspection. Skin is pink, warm, and dry on palpation.   Eyes: Normal conjunctivae and lids, sclerae anicteric.  No uveitis.  Pulmonary: Clear to percussion and auscultation, no adventitious breath sounds. No use of accessory muscles.  Cardiovascular: Regular rate and rhythm on auscultation. Normal S1 and S2.  Abdomen: Soft, non-tender at this time, without any appreciable distention, normal bowel sounds X 4 quadrants. No hepatosplenomegaly or palpable abdominal masses.  Rectal: Deferred.  MS: Patient moving all extremities appropriately, no gait imbalance or ataxia. No joint pain or joint swelling.    ASSESSMENT: K50.90 Crohn disease    Patient is presenting in a follow-up for Crohn disease. It appears that his condition has not been stable and well controlled for quite a while despite the use of Humira weekly. Considering the patient has had Crohn disease for at least 10 years and his last colonoscopy was completed about 5 years ago, he will be scheduled for repeat colonoscopy at this junction and this procedure was reviewed with him in a great detail today. Moreover, his prescription for Humira has been refilled and he should continue taking this medication once daily and he will have several labs completed as well in order to evaluate for possibility of anemia or any other possible sequela/complications of inadequately treated Crohn's disease. Depending on his colonoscopy results, his treatment regimen may changed. Patient was meanwhile encouraged to continue following appropriate dietary measures and to avoid any NSAIDs. He will follow-up with GI in 2-3 months if his condition is stable but if he has any questions or concerns he should be seen at his earliest convenience.    PLAN:  Please continue taking all of your scheduled medications as prescribed.     Will repeat CBC, CMP, CRP, and ESR.     Will repeat colonoscopy at this time.      Avoid any  NSAIDs pain medications such as Ibuprofen or Naproxen as they may cause acute flareup of Crohn disease; may use Tylenol as a safe alternative.     Please follow-up with GI in 2-3 months if your condition is stable and if you have any questions or concerns or worsening of your symptoms you should follow-up at your earliest convenience.   breath sounds clear and equal bilaterally.

## 2017-04-17 ENCOUNTER — OUTPATIENT (OUTPATIENT)
Dept: OUTPATIENT SERVICES | Facility: HOSPITAL | Age: 62
LOS: 1 days | End: 2017-04-17
Payer: COMMERCIAL

## 2017-04-17 ENCOUNTER — APPOINTMENT (OUTPATIENT)
Dept: MRI IMAGING | Facility: CLINIC | Age: 62
End: 2017-04-17

## 2017-04-17 DIAGNOSIS — Z98.890 OTHER SPECIFIED POSTPROCEDURAL STATES: Chronic | ICD-10-CM

## 2017-04-17 DIAGNOSIS — Z90.49 ACQUIRED ABSENCE OF OTHER SPECIFIED PARTS OF DIGESTIVE TRACT: Chronic | ICD-10-CM

## 2017-04-17 DIAGNOSIS — Z00.8 ENCOUNTER FOR OTHER GENERAL EXAMINATION: ICD-10-CM

## 2017-04-17 PROCEDURE — 82565 ASSAY OF CREATININE: CPT

## 2017-04-17 PROCEDURE — 72197 MRI PELVIS W/O & W/DYE: CPT

## 2017-04-17 PROCEDURE — A9585: CPT

## 2018-10-22 ENCOUNTER — EMERGENCY (EMERGENCY)
Facility: HOSPITAL | Age: 63
LOS: 0 days | Discharge: HOME | End: 2018-10-22
Attending: EMERGENCY MEDICINE | Admitting: EMERGENCY MEDICINE
Payer: COMMERCIAL

## 2018-10-22 VITALS — HEIGHT: 72 IN | WEIGHT: 210.1 LBS

## 2018-10-22 VITALS
DIASTOLIC BLOOD PRESSURE: 99 MMHG | OXYGEN SATURATION: 98 % | SYSTOLIC BLOOD PRESSURE: 137 MMHG | HEART RATE: 73 BPM | RESPIRATION RATE: 17 BRPM | TEMPERATURE: 99 F

## 2018-10-22 DIAGNOSIS — I10 ESSENTIAL (PRIMARY) HYPERTENSION: ICD-10-CM

## 2018-10-22 DIAGNOSIS — Z90.49 ACQUIRED ABSENCE OF OTHER SPECIFIED PARTS OF DIGESTIVE TRACT: ICD-10-CM

## 2018-10-22 DIAGNOSIS — Z98.890 OTHER SPECIFIED POSTPROCEDURAL STATES: ICD-10-CM

## 2018-10-22 DIAGNOSIS — Z79.82 LONG TERM (CURRENT) USE OF ASPIRIN: ICD-10-CM

## 2018-10-22 DIAGNOSIS — L03.317 CELLULITIS OF BUTTOCK: ICD-10-CM

## 2018-10-22 DIAGNOSIS — Z98.890 OTHER SPECIFIED POSTPROCEDURAL STATES: Chronic | ICD-10-CM

## 2018-10-22 DIAGNOSIS — Z90.49 ACQUIRED ABSENCE OF OTHER SPECIFIED PARTS OF DIGESTIVE TRACT: Chronic | ICD-10-CM

## 2018-10-22 DIAGNOSIS — Z79.899 OTHER LONG TERM (CURRENT) DRUG THERAPY: ICD-10-CM

## 2018-10-22 DIAGNOSIS — Z87.19 PERSONAL HISTORY OF OTHER DISEASES OF THE DIGESTIVE SYSTEM: ICD-10-CM

## 2018-10-22 DIAGNOSIS — L02.31 CUTANEOUS ABSCESS OF BUTTOCK: ICD-10-CM

## 2018-10-22 PROBLEM — K57.92 DIVERTICULITIS OF INTESTINE, PART UNSPECIFIED, WITHOUT PERFORATION OR ABSCESS WITHOUT BLEEDING: Chronic | Status: ACTIVE | Noted: 2017-02-12

## 2018-10-22 PROCEDURE — 99283 EMERGENCY DEPT VISIT LOW MDM: CPT

## 2018-10-22 RX ORDER — IBUPROFEN 200 MG
1 TABLET ORAL
Qty: 30 | Refills: 0
Start: 2018-10-22

## 2018-10-22 NOTE — ED STATDOCS - PROGRESS NOTE DETAILS
63 y/o M with PMH of HTN, diverticulitis presents with abscess/cellulitis to left buttock. Pt states he was seen at outside urgent care 4 days ago and started on clindamycin at that time. He was told at that time there was nothing to drain. States he has been compliant with clindamycin since seen at urgent care. Has been using oxycodone for pain as well. He is unsure if he's getting any benefit from clindamycin. Denies experiencing fever, chills, nausea, vomiting, CP, SOB since starting clindamycin. PE: NAD. Cardiac: s1/s2, RRR. Lungs: CTAB. Abdomen: NBS x4, soft, nontender. noted lower midline abdominal scar with mild umbilical hernia. Skin: noted erythema over left buttock/sacrum which is indurated, without area of fluctuance. Area is not tracking towards rectum. A/P: cellulitis. Will add bactrim to clindamycin. Will provide surgical referral for eval following antibiotic therapy. - Afshin Hawley PA-C

## 2018-10-22 NOTE — ED ADULT TRIAGE NOTE - CHIEF COMPLAINT QUOTE
pt states he has left buttock abscess, has been on antibiotics/clindamycin and oxycodone for pain with no relief in symptoms

## 2018-10-22 NOTE — ED STATDOCS - OBJECTIVE STATEMENT
61 y/o M with a PMHx of HTN and Diverticulitis presenting to the ED c/o abscess on left buttocks. Reports that he was evaluated at Urgent Care four days ago for this issue. Physician at Urgent Care was going to perform an I&D but then noticed that there was no fluid to the area so I&D was nor performed. Pt was placed on Clindamycin at this time. He has been in compliance with Abx but came into ED today because pain to the abscess has persisted. Denies any fevers, chills, abd pain, N/V/D, HA, CP, or SOB. NKDA.

## 2019-02-12 ENCOUNTER — RX RENEWAL (OUTPATIENT)
Age: 64
End: 2019-02-12

## 2019-02-12 DIAGNOSIS — M76.60 ACHILLES TENDINITIS, UNSPECIFIED LEG: ICD-10-CM

## 2019-04-24 ENCOUNTER — RX RENEWAL (OUTPATIENT)
Age: 64
End: 2019-04-24

## 2019-05-14 ENCOUNTER — APPOINTMENT (OUTPATIENT)
Dept: UROLOGY | Facility: CLINIC | Age: 64
End: 2019-05-14
Payer: COMMERCIAL

## 2019-05-14 VITALS
DIASTOLIC BLOOD PRESSURE: 87 MMHG | HEIGHT: 72 IN | RESPIRATION RATE: 16 BRPM | BODY MASS INDEX: 28.44 KG/M2 | HEART RATE: 80 BPM | WEIGHT: 210 LBS | TEMPERATURE: 98.1 F | SYSTOLIC BLOOD PRESSURE: 152 MMHG | OXYGEN SATURATION: 98 %

## 2019-05-14 PROCEDURE — 99214 OFFICE O/P EST MOD 30 MIN: CPT

## 2019-05-15 NOTE — HISTORY OF PRESENT ILLNESS
[FreeTextEntry1] : 63 year old man seen 05/14/2019. He had been seen by Dr Tavera for hypogonadism, BPH, and elevated PSA. He is here for "second opinion" about his care.  He reports frequency, urgency, hesitancy, intermittency. He was given Rx for tamsulosin and finasteride by Dr Brett Seo. He has not started these meds yet. This began years ago. It is moderate in severity. Nothing makes the symptoms better, he thinks TRT made sx worse. It is associated with nothing. He had elevated PSA, see trend below. He refused prostate biopsy but agreed to MRI (4/2017). MRI was PIRADs 2.  \par No hematuria, no dysuria, no frequency, no urgency, no hesitancy, no straining. No incontinence. \par No fevers, no chills, no nausea, no vomiting, no flank pain. No fatigue, no decreased libido, no depression.\par \par No family history contributory to BPH, elevated PSA. \par \par PSA (%FREE) TREND \par 4.0   07/2017\par 8.4   04/2017\par 4.3   01/2017\par 4.7   12/2016\par 3.9   03/2015

## 2019-05-15 NOTE — ASSESSMENT
[FreeTextEntry1] : 62 yo M with BPH with LUTS, h/o elevated PSA, and hypogonadism. No hypogonadal symptoms now off TRT. Recommended against treatment. \par \par Discussed with patient that PSA is imprecise test. PSA can be elevated due to benign prostate enlargement, prostate stimulation, sexual activity, urinary tract infection, prostatitis, as well as prostate cancer. There is no value for PSA which is diagnostic for prostate cancer, nor is there value which conclusively excludes prostate cancer. PSA simply stratifies risk for prostate cancer and diagnosis of prostate cancer requires prostate biopsy. Given unremarkable MRI when his PSA was at its peak, as well as his very large prostate, PSA of around 4 is not concerning. would not biopsy at this time. Recommend routine screening. He states Dr Seo draws PSA every 6 months. \par \par He has very large prostate on KALEB. Agree with initiation of dual therapy.

## 2019-05-15 NOTE — PHYSICAL EXAM
[General Appearance - Well Developed] : well developed [General Appearance - Well Nourished] : well nourished [General Appearance - In No Acute Distress] : no acute distress [Abdomen Soft] : soft [Normal Appearance] : normal appearance [Well Groomed] : well groomed [Abdomen Tenderness] : non-tender [Costovertebral Angle Tenderness] : no ~M costovertebral angle tenderness [Urethral Meatus] : meatus normal [No Prostate Nodules] : no prostate nodules [Testes Mass (___cm)] : there were no testicular masses [Scrotum] : the scrotum was normal [Urinary Bladder Findings] : the bladder was normal on palpation [Prostate Size ___ gm] : prostate size [unfilled] gm [Edema] : no peripheral edema [] : no respiratory distress [Respiration, Rhythm And Depth] : normal respiratory rhythm and effort [Exaggerated Use Of Accessory Muscles For Inspiration] : no accessory muscle use [Oriented To Time, Place, And Person] : oriented to person, place, and time [Mood] : the mood was normal [Affect] : the affect was normal [Normal Station and Gait] : the gait and station were normal for the patient's age [No Focal Deficits] : no focal deficits [Not Anxious] : not anxious [No Palpable Adenopathy] : no palpable adenopathy

## 2019-05-22 ENCOUNTER — RESULT REVIEW (OUTPATIENT)
Age: 64
End: 2019-05-22

## 2019-06-26 ENCOUNTER — APPOINTMENT (OUTPATIENT)
Dept: ORTHOPEDIC SURGERY | Facility: CLINIC | Age: 64
End: 2019-06-26
Payer: OTHER MISCELLANEOUS

## 2019-06-26 VITALS
SYSTOLIC BLOOD PRESSURE: 108 MMHG | HEIGHT: 73 IN | WEIGHT: 210 LBS | DIASTOLIC BLOOD PRESSURE: 67 MMHG | BODY MASS INDEX: 27.83 KG/M2 | HEART RATE: 88 BPM

## 2019-06-26 DIAGNOSIS — Z86.79 PERSONAL HISTORY OF OTHER DISEASES OF THE CIRCULATORY SYSTEM: ICD-10-CM

## 2019-06-26 DIAGNOSIS — Z78.9 OTHER SPECIFIED HEALTH STATUS: ICD-10-CM

## 2019-06-26 PROCEDURE — 73560 X-RAY EXAM OF KNEE 1 OR 2: CPT | Mod: LT

## 2019-06-26 PROCEDURE — 99214 OFFICE O/P EST MOD 30 MIN: CPT | Mod: 25

## 2019-06-26 PROCEDURE — 20610 DRAIN/INJ JOINT/BURSA W/O US: CPT | Mod: LT

## 2019-07-02 NOTE — PHYSICAL EXAM
[de-identified] : Right Knee:\par Range of Motion in Degrees	\par 	                  Claimant:	Normal:	\par Flexion Active	  135 	                135-degrees	\par Flexion Passive	  135	                135-degrees	\par Extension Active	  0-5	                0-5-degrees	\par Extension Passive	  0-5	                0-5-degrees	\par \par No weakness to flexion/extension.  No evidence of instability in the AP plane or varus or valgus stress.  Negative  Lachman.  Negative pivot shift.  Negative anterior drawer test.  Negative posterior drawer test.  Negative Elizabeth.  Negative Apley grind.  No medial or lateral joint line tenderness.  No tenderness over the medial and lateral facet of the patella.  No patellofemoral crepitations.  No lateral tilting patella.  No patellar apprehension.  No crepitation in the medial and lateral femoral condyle.  No proximal or distal swelling, edema or tenderness.  No gross motor or sensory deficits.  Mild intra-articular swelling.  2+ DP and PT pulses. No varus or valgus malalignment.  Skin is intact.  No rashes, scars or lesions.  \par \par Left Knee: \par Range of Motion in Degrees	\par 	                  Claimant:	Normal:	\par Flexion Active	  135 	                135-degrees	\par Flexion Passive	  135	                135-degrees	\par Extension Active	  0-5	                0-5-degrees	\par Extension Passive	  0-5	                0-5-degrees	\par  \par No weakness to flexion/extension.  No evidence of instability in the AP plane or varus or valgus stress.  Negative  Lachman.  Negative pivot shift.  Negative anterior drawer test.  Negative posterior drawer test.  Positive lateral joint line tenderness.  Positive Elizabeth.  Positive Apley grind.  No medial joint line tenderness.  No tenderness over the medial and lateral facet of the patella.  No patellofemoral crepitations.  No lateral tilting patella.  No patella apprehension.  No crepitation in the medial and lateral femoral condyle.  No proximal or distal swelling, edema or tenderness.  No gross motor or sensory deficits.  Mild to moderate effusion.  2+ DP and PT pulses.  No varus or valgus malalignment.  Skin is intact.  No rashes, scars or lesions.  \par    [de-identified] : Appearance:  Well-developed, well-nourished male in no acute distress.\par  \par  [de-identified] : Ambulating with a slightly antalgic to antalgic gait.  Station:  Normal.  [de-identified] : Radiographs, two views of the left knee, show minimal degenerative changes.

## 2019-07-02 NOTE — DISCUSSION/SUMMARY
[de-identified] : At this time, due to probable tear of the lateral meniscus, I recommended ice and elevation.  He will be reassessed in one week.  Should his symptoms persist, he may require an MRI.\par \par The Workers' Compensation guidelines have been followed.\par

## 2019-07-02 NOTE — PROCEDURE
[de-identified] : Consent: \par At this time, I have recommended an injection to the left knee.  The risks and benefits of the procedure were discussed with the patient in detail.  Upon verbal consent of the patient, we proceeded with the injection as noted below.  \par \par Procedure:  \par After a sterile prep, the patient underwent an injection of 9 cc of 1% Lidocaine without epinephrine and 1 cc of Kenalog into the left knee.  The patient tolerated the procedure well.  There were no complications.  \par

## 2019-07-02 NOTE — HISTORY OF PRESENT ILLNESS
[(Does not interfere) 0] : the ailment interference is 0/10 (does not interfere) [3] : the ailment interference is 3/10 [10  (interferes completely)] : the ailment interference is10/10 (interferes completely) [de-identified] : The patient comes in today complaining of pain in the left knee.  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  He twisted and had an onset of pain and swelling.  It has gotten a little bit worse and he has some difficulty ambulating.   The patient states the onset/injury occurred on 6/25/19.  This injury is work related.  The patient states the pain is localized.  The patient describes the pain as dull to sharp.  The patient notes rest makes his symptoms better, while crouching down makes his symptoms worse.  The patient indicates pain is at a level of 2 on a pain scale of 0-10. [] : No

## 2019-07-03 ENCOUNTER — APPOINTMENT (OUTPATIENT)
Dept: ORTHOPEDIC SURGERY | Facility: CLINIC | Age: 64
End: 2019-07-03
Payer: OTHER MISCELLANEOUS

## 2019-07-03 VITALS
TEMPERATURE: 98.2 F | WEIGHT: 210 LBS | SYSTOLIC BLOOD PRESSURE: 130 MMHG | DIASTOLIC BLOOD PRESSURE: 80 MMHG | HEART RATE: 70 BPM | HEIGHT: 72 IN | BODY MASS INDEX: 28.44 KG/M2

## 2019-07-03 DIAGNOSIS — S83.242A OTHER TEAR OF MEDIAL MENISCUS, CURRENT INJURY, LEFT KNEE, INITIAL ENCOUNTER: ICD-10-CM

## 2019-07-03 PROCEDURE — 99213 OFFICE O/P EST LOW 20 MIN: CPT

## 2019-07-11 ENCOUNTER — OUTPATIENT (OUTPATIENT)
Dept: OUTPATIENT SERVICES | Facility: HOSPITAL | Age: 64
LOS: 1 days | End: 2019-07-11
Payer: COMMERCIAL

## 2019-07-11 ENCOUNTER — APPOINTMENT (OUTPATIENT)
Dept: MRI IMAGING | Facility: CLINIC | Age: 64
End: 2019-07-11
Payer: OTHER MISCELLANEOUS

## 2019-07-11 DIAGNOSIS — Z90.49 ACQUIRED ABSENCE OF OTHER SPECIFIED PARTS OF DIGESTIVE TRACT: Chronic | ICD-10-CM

## 2019-07-11 DIAGNOSIS — Z98.890 OTHER SPECIFIED POSTPROCEDURAL STATES: Chronic | ICD-10-CM

## 2019-07-11 DIAGNOSIS — Z00.8 ENCOUNTER FOR OTHER GENERAL EXAMINATION: ICD-10-CM

## 2019-07-11 PROCEDURE — 73721 MRI JNT OF LWR EXTRE W/O DYE: CPT

## 2019-07-11 PROCEDURE — 73721 MRI JNT OF LWR EXTRE W/O DYE: CPT | Mod: 26,LT

## 2019-07-11 NOTE — PHYSICAL EXAM
[de-identified] : Left knee:\par Knee: Range of Motion in Degrees	\par 	                  Claimant:	Normal:	\par Flexion Active	  135 	                135-degrees	\par Flexion Passive	  135	                135-degrees	\par Extension Active	  0-5	                0-5-degrees	\par Extension Passive	  0-5	                0-5-degrees	\par \par No weakness to flexion/extension.  No evidence of instability in the AP plane or varus or valgus stress.  Negative  Lachman.  Negative pivot shift.  Negative anterior drawer test.  Negative posterior drawer test.  Positive medial joint line tenderness.  Negative lateral joint line tenderness.  Positive Elizabeth.  Positive Apley grind.  No tenderness over the medial and lateral facet of the patella.  No patellofemoral crepitations.  No lateral tilting patella.  No patella apprehension.  No crepitation in the medial and lateral femoral condyle.  No proximal or distal swelling, edema or tenderness.  No gross motor or sensory deficits.  Mild intra-articular swelling.  2+ DP and PT pulses.  No varus or valgus malalignment.  Skin is intact.  No rashes, scars or lesions.   \par   [de-identified] : Gait: Slightly antalgic to antalgic gait.  Station: Normal  [de-identified] : Appearance: Well-developed, well-nourished male  in no acute distress.

## 2019-07-11 NOTE — REASON FOR VISIT
[Follow-Up Visit] : a follow-up visit for [Worker's Compensation] : This visit is related to worker's compensation [FreeTextEntry2] : for his left knee.

## 2019-07-11 NOTE — DISCUSSION/SUMMARY
[de-identified] : The patient presents with a probable meniscal tear, left knee.  At this time, because of the severity of his complaints, mechanical episodes of the knee catching, popping and locking,  I recommend he undergo an MRI.  He will be reassessed after the MRI.\par \par WE REQUEST AUTHORIZATION FOR AN MRI OF THE LEFT KNEE.\par \par The Workers' Compensation guidelines have been followed.\par

## 2019-07-18 ENCOUNTER — APPOINTMENT (OUTPATIENT)
Dept: ORTHOPEDIC SURGERY | Facility: CLINIC | Age: 64
End: 2019-07-18
Payer: OTHER MISCELLANEOUS

## 2019-07-18 VITALS
HEIGHT: 72 IN | BODY MASS INDEX: 28.44 KG/M2 | DIASTOLIC BLOOD PRESSURE: 86 MMHG | HEART RATE: 66 BPM | WEIGHT: 210 LBS | TEMPERATURE: 97.8 F | SYSTOLIC BLOOD PRESSURE: 129 MMHG

## 2019-07-18 PROCEDURE — 99213 OFFICE O/P EST LOW 20 MIN: CPT

## 2019-07-29 NOTE — HISTORY OF PRESENT ILLNESS
[de-identified] : Joe comes in today.  He states he is definitely feeling a bit better.  His knee is not catching and locking like it was.

## 2019-07-29 NOTE — DISCUSSION/SUMMARY
[de-identified] : The patient presents with osteoarthritis and medial meniscal tear, left knee.  At this time because he has minimal complaints, I have instructed him on home therapeutic modalities.  I will allow him to return to work.  He will be reassessed in three weeks.  Should his symptoms warrant, we may discuss the potential for arthroscopic intervention.\par \par The Workers' Compensation guidelines have been followed.\par  \par

## 2019-07-29 NOTE — PHYSICAL EXAM
[de-identified] : Left knee:\par Knee: Range of Motion in Degrees	\par 	                  Claimant:	Normal:	\par Flexion Active	  135 	               135-degrees	\par Flexion Passive	  135	               135-degrees	\par Extension Active	  0-5	               0-5-degrees	\par Extension Passive     0-5	               0-5-degrees	\par \par No weakness to flexion/extension.  No evidence of instability in the AP plane or varus or valgus stress.  Negative  Lachman.  Negative pivot shift.  Negative anterior drawer test.  Negative posterior drawer test.  Positive Elizabeth.  Positive Apley grind.  Positive medial joint line tenderness.  Negative lateral joint line tenderness.  Positive tenderness over the medial and lateral facet of the patella.  Positive patellofemoral crepitations.  No lateral tilting patella.  No patellar apprehension.  Positive crepitation in the medial and lateral femoral condyle.  No proximal or distal swelling, edema or tenderness.  No gross motor or sensory deficits.  Mild intra-articular swelling.  2+ DP and PT pulses.  No varus or valgus malalignment.  Skin is intact.  No rashes, scars or lesions.  [de-identified] : Gait: Slightly antalgic to antalgic gait.  Station: Normal  [de-identified] : Review of his MRI shows an undersurface tear, midbody, with a small radial component to the posterior horn. [de-identified] : Appearance: Well-developed, well-nourished male  in no acute distress.

## 2019-07-29 NOTE — ADDENDUM
[FreeTextEntry1] : This note was written by Sondra Vega on 07/29/2019 acting as scribe for Kalpesh Joy III, MD

## 2019-08-08 ENCOUNTER — APPOINTMENT (OUTPATIENT)
Dept: ORTHOPEDIC SURGERY | Facility: CLINIC | Age: 64
End: 2019-08-08
Payer: OTHER MISCELLANEOUS

## 2019-08-08 VITALS
HEART RATE: 80 BPM | SYSTOLIC BLOOD PRESSURE: 114 MMHG | DIASTOLIC BLOOD PRESSURE: 73 MMHG | BODY MASS INDEX: 28.99 KG/M2 | TEMPERATURE: 98.1 F | HEIGHT: 72 IN | WEIGHT: 214 LBS

## 2019-08-08 DIAGNOSIS — S83.204A OTHER TEAR OF UNSPECIFIED MENISCUS, CURRENT INJURY, LEFT KNEE, INITIAL ENCOUNTER: ICD-10-CM

## 2019-08-08 PROCEDURE — 99213 OFFICE O/P EST LOW 20 MIN: CPT

## 2019-08-12 NOTE — ADDENDUM
[FreeTextEntry1] : This note was written by Milana Jiménez on 08/09/2019 acting as scribe for Kalpesh Joy III, MD

## 2019-08-12 NOTE — DISCUSSION/SUMMARY
[de-identified] : At this time, I have recommended that he undergo a course of physical therapy for the left knee osteoarthritis and medial meniscal tear.  He will be reassessed in one month.  We will discuss the potential for surgical intervention.  \par \par THIS IS A FORMAL REQUEST FOR AUTHORIZATION FOR PHYSICAL THERAPY FOR THE LEFT KNEE.\par \par The Workers' Compensation guidelines have been followed.\par

## 2019-08-12 NOTE — REASON FOR VISIT
[Follow-Up Visit] : a follow-up visit for [FreeTextEntry2] : his left knee.  This visit is related to a Workers' Compensation injury

## 2019-08-12 NOTE — PHYSICAL EXAM
[de-identified] : Left Knee: \par Knee: Range of Motion in Degrees	\par 	                  Claimant:	Normal:	\par Flexion Active	  135 	               135-degrees	\par Flexion Passive	  135	               135-degrees	\par Extension Active	  0-5	               0-5-degrees	\par Extension Passive     0-5	               0-5-degrees	\par \par No weakness to flexion/extension.  No evidence of instability in the AP plane or varus or valgus stress.  Negative  Lachman.  Negative pivot shift.  Negative anterior drawer test.  Negative posterior drawer test.  Positive Elizabeth.  Positive Apley grind.  Positive medial joint line tenderness.  Negative lateral joint line tenderness.  Positive tenderness over the medial and lateral facet of the patella.  Positive patellofemoral crepitations.  No lateral tilting patella.  No patellar apprehension.  Positive crepitation in the medial and lateral femoral condyle.  No proximal or distal swelling, edema or tenderness.  No gross motor or sensory deficits.  Mild intra-articular swelling.  2+ DP and PT pulses.  No varus or valgus malalignment.  Skin is intact.  No rashes, scars or lesions. \par  [de-identified] : Ambulating with a slightly antalgic to antalgic gait.  Station:  Normal.  [de-identified] : General Appearance:  Well-developed, well-nourished male in no acute distress.

## 2019-09-11 ENCOUNTER — APPOINTMENT (OUTPATIENT)
Dept: ORTHOPEDIC SURGERY | Facility: CLINIC | Age: 64
End: 2019-09-11
Payer: OTHER MISCELLANEOUS

## 2019-09-11 VITALS
TEMPERATURE: 98 F | SYSTOLIC BLOOD PRESSURE: 123 MMHG | WEIGHT: 215 LBS | HEIGHT: 72 IN | BODY MASS INDEX: 29.12 KG/M2 | DIASTOLIC BLOOD PRESSURE: 84 MMHG | HEART RATE: 93 BPM

## 2019-09-11 PROCEDURE — 99213 OFFICE O/P EST LOW 20 MIN: CPT

## 2019-09-17 NOTE — PHYSICAL EXAM
[Normal] : Gait: normal [de-identified] : Appearance:  Well-developed, well-nourished male in no acute distress.\par   [de-identified] : Left Knee: Range of Motion in Degrees	\par 	                  Claimant:	Normal:	\par Flexion Active	  135 	                135-degrees	\par Flexion Passive	  135	                135-degrees	\par Extension Active	  0-5	                0-5-degrees	\par Extension Passive	  0-5	                0-5-degrees	\par \par No weakness to flexion/extension.  No evidence of instability in the AP plane or varus or valgus stress.  Negative  Lachman.  Negative pivot shift.  Negative anterior drawer test.  Negative posterior drawer test.  Negative Elizabeth.  Negative Apley grind.  No medial or lateral joint line tenderness.  No tenderness over the medial and lateral facet of the patella.  No patellofemoral crepitations.  No lateral tilting patella.  No patellar apprehension.  No crepitation in the medial and lateral femoral condyle.  No proximal or distal swelling, edema or tenderness.  No gross motor or sensory deficits.  No intra-articular swelling.  2+ DP and PT pulses. No varus or valgus malalignment.  Skin is intact.  No rashes, scars or lesions.  \par

## 2019-09-17 NOTE — REASON FOR VISIT
[Follow-Up Visit] : a follow-up visit for [Worker's Compensation] : This visit is related to worker's compensation [FreeTextEntry2] : his left knee.

## 2019-09-17 NOTE — ADDENDUM
[FreeTextEntry1] : This note was written by Jenny Webb on 09/17/2019 acting as scribe for Kalpesh Joy III, MD

## 2019-09-17 NOTE — DISCUSSION/SUMMARY
[de-identified] : At this time, the patient is doing well.  The patient will finish his course of physical therapy for his left knee osteoarthritis.  He is already back to work.  He will follow up on an as needed basis.  \par \par The Workers' Compensation guidelines have been followed.\par

## 2019-10-14 ENCOUNTER — APPOINTMENT (OUTPATIENT)
Dept: ORTHOPEDIC SURGERY | Facility: CLINIC | Age: 64
End: 2019-10-14
Payer: OTHER MISCELLANEOUS

## 2019-10-14 VITALS
BODY MASS INDEX: 28.71 KG/M2 | WEIGHT: 212 LBS | TEMPERATURE: 98.2 F | HEIGHT: 72 IN | HEART RATE: 91 BPM | SYSTOLIC BLOOD PRESSURE: 126 MMHG | DIASTOLIC BLOOD PRESSURE: 86 MMHG

## 2019-10-14 DIAGNOSIS — S83.207A UNSPECIFIED TEAR OF UNSPECIFIED MENISCUS, CURRENT INJURY, LEFT KNEE, INITIAL ENCOUNTER: ICD-10-CM

## 2019-10-14 PROCEDURE — 99213 OFFICE O/P EST LOW 20 MIN: CPT

## 2019-10-22 PROBLEM — S83.207A TEAR OF MENISCUS OF LEFT KNEE AS CURRENT INJURY, UNSPECIFIED MENISCUS, UNSPECIFIED TEAR TYPE, INITIAL ENCOUNTER: Status: ACTIVE | Noted: 2019-07-18

## 2019-10-22 NOTE — DISCUSSION/SUMMARY
[de-identified] : The patient is doing well overall with his osteoarthritis, left knee.  At this time I instructed on home therapeutic modalities.  I would only consider re-starting therapy should his symptoms warrant.\par \par The Workers' Compensation guidelines have been followed.\par

## 2019-10-22 NOTE — PHYSICAL EXAM
[de-identified] : Left knee:\par Knee: Range of Motion in Degrees	\par 	                  Claimant:	Normal:	\par Flexion Active	  135 	                135-degrees	\par Flexion Passive	  135	                135-degrees	\par Extension Active	  0-5	                0-5-degrees	\par Extension Passive	  0-5	                0-5-degrees	\par \par No weakness to flexion/extension. No evidence of instability in the AP plane or varus or valgus stress.  Negative  Lachman.  Negative pivot shift.  Negative anterior drawer test.  Negative posterior drawer test.  Negative Elizabeth.  Negative Apley grind.  No medial or lateral joint line tenderness.  Positive tenderness over the medial and lateral facet of the patella.  Positive patellofemoral crepitations.  No lateral tilting patella.  No patella apprehension.  Positive crepitation in the medial and lateral femoral condyle.  No proximal or distal swelling, edema or tenderness.  No gross motor or sensory deficits. Mild intra-articular swelling.  2+ DP and PT pulses. No varus or valgus malalignment.  Skin is intact.  No rashes, scars or lesions.  [de-identified] : Appearance: Well-developed, well-nourished male  in no acute distress.  [de-identified] : Gait: Slightly antalgic to antalgic gait.  Station: Normal

## 2019-11-18 ENCOUNTER — APPOINTMENT (OUTPATIENT)
Dept: ORTHOPEDIC SURGERY | Facility: CLINIC | Age: 64
End: 2019-11-18
Payer: OTHER MISCELLANEOUS

## 2019-11-18 VITALS
DIASTOLIC BLOOD PRESSURE: 85 MMHG | TEMPERATURE: 98.2 F | HEART RATE: 93 BPM | HEIGHT: 72 IN | BODY MASS INDEX: 28.71 KG/M2 | WEIGHT: 212 LBS | SYSTOLIC BLOOD PRESSURE: 129 MMHG

## 2019-11-18 PROCEDURE — 99213 OFFICE O/P EST LOW 20 MIN: CPT

## 2019-11-22 NOTE — DISCUSSION/SUMMARY
[de-identified] : Patient is doing well status post left knee osteoarthritis.  At this time, he was instructed on home therapeutic modalities.  He will follow up on an as needed basis.

## 2019-11-22 NOTE — PHYSICAL EXAM
[de-identified] : Left Knee: \par Knee: Range of Motion in Degrees	\par 	                  Claimant:	Normal:	\par Flexion Active	  135 	                135-degrees	\par Flexion Passive	  135	                135-degrees	\par Extension Active	  0-5	                0-5-degrees	\par Extension Passive	  0-5	                0-5-degrees	\par \par No weakness to flexion/extension. No evidence of instability in the AP plane or varus or valgus stress.  Negative  Lachman.  Negative pivot shift.  Negative anterior drawer test.  Negative posterior drawer test.  Negative Elizabeth.  Negative Apley grind.  No medial or lateral joint line tenderness.  Positive tenderness over the medial and lateral facet of the patella.  Positive patellofemoral crepitations.  No lateral tilting patella.  No patella apprehension.  Positive crepitation in the medial and lateral femoral condyle.  No proximal or distal swelling, edema or tenderness.  No gross motor or sensory deficits. Mild intra-articular swelling.  2+ DP and PT pulses. No varus or valgus malalignment.  Skin is intact.  No rashes, scars or lesions. \par  [de-identified] : Ambulating with a normal gait.  Station:  Normal.  [de-identified] : General Appearance:  Well-developed, well-nourished male in no acute distress.

## 2019-11-22 NOTE — ADDENDUM
[FreeTextEntry1] : This note was written by Milana Jiménez on 11/21/2019 acting as scribe for Kalpesh Joy III, MD

## 2020-09-28 ENCOUNTER — APPOINTMENT (OUTPATIENT)
Dept: ORTHOPEDIC SURGERY | Facility: CLINIC | Age: 65
End: 2020-09-28
Payer: OTHER MISCELLANEOUS

## 2020-09-28 VITALS
DIASTOLIC BLOOD PRESSURE: 82 MMHG | SYSTOLIC BLOOD PRESSURE: 127 MMHG | BODY MASS INDEX: 29.12 KG/M2 | HEART RATE: 73 BPM | HEIGHT: 72 IN | WEIGHT: 215 LBS

## 2020-09-28 DIAGNOSIS — M17.12 UNILATERAL PRIMARY OSTEOARTHRITIS, LEFT KNEE: ICD-10-CM

## 2020-09-28 PROCEDURE — 99213 OFFICE O/P EST LOW 20 MIN: CPT

## 2020-09-28 PROCEDURE — 73560 X-RAY EXAM OF KNEE 1 OR 2: CPT | Mod: LT

## 2020-09-30 PROBLEM — M17.12 PRIMARY OSTEOARTHRITIS OF LEFT KNEE: Status: ACTIVE | Noted: 2019-07-18

## 2020-10-26 NOTE — DISCUSSION/SUMMARY
[de-identified] : At this time, due to osteoarthritis of the left knee, he will return to his full activities and follow up with me on an as needed basis.  According to [place] Workers' Compensation guidelines, I recommend a schedule loss of 10%.\par \par The Workers' Compensation guidelines have been followed.\par

## 2020-10-26 NOTE — ADDENDUM
[FreeTextEntry1] : This note was written by Oral Le on 09/30/2020, acting as a scribe for Kalpesh Joy III, MD

## 2020-10-26 NOTE — HISTORY OF PRESENT ILLNESS
[de-identified] : The patient comes in today for a schedule loss for his left knee.  He has not been seen in a while.  He states on occasion, he will get some pain, maybe up to a level of 4 on a scale of 0-10.  \par

## 2020-10-26 NOTE — REASON FOR VISIT
[Follow-Up Visit] : a follow-up visit for [Worker's Compensation] : This visit is related to worker's compensation [FreeTextEntry2] : his left knee

## 2020-10-26 NOTE — PHYSICAL EXAM
[de-identified] : Left Knee: Range of Motion in Degrees	\par 	                  Claimant:	Normal:	\par Flexion Active	  135 	                135-degrees	\par Flexion Passive	  135	                135-degrees	\par Extension Active	  0-5	                0-5-degrees	\par Extension Passive	  0-5	                0-5-degrees	\par \par No weakness to flexion/extension.  No evidence of instability in the AP plane or varus or valgus stress.  Negative  Lachman.  Negative pivot shift.  Negative anterior drawer test.  Negative posterior drawer test.  Negative Elizabeth.  Negative Apley grind.  No medial or lateral joint line tenderness.  Positive tenderness over the medial and lateral facet of the patella.  Positive patellofemoral crepitations.  No lateral tilting patella.  No patella apprehension.  Positive crepitation in the medial and lateral femoral condyle.  No proximal or distal swelling, edema or tenderness.  No gross motor or sensory deficits.  Mild intra-articular swelling.  2+ DP and PT pulses.  No varus or valgus malalignment.  Skin is intact.  No rashes, scars or lesions.  \par   [de-identified] : Gait and Station:  Ambulating with a slightly antalgic to antalgic gait.  Normal Station.  [de-identified] : Appearance:  Well developed, well-nourished male in no acute distress.\par   [de-identified] : Radiographs, one to two views of the left knee, show mild-to-early moderate degenerative changes.\par

## 2020-10-29 ENCOUNTER — APPOINTMENT (OUTPATIENT)
Dept: ORTHOPEDIC SURGERY | Facility: CLINIC | Age: 65
End: 2020-10-29
Payer: COMMERCIAL

## 2020-10-29 VITALS
BODY MASS INDEX: 29.12 KG/M2 | HEIGHT: 72 IN | DIASTOLIC BLOOD PRESSURE: 88 MMHG | HEART RATE: 78 BPM | SYSTOLIC BLOOD PRESSURE: 144 MMHG | WEIGHT: 215 LBS

## 2020-10-29 DIAGNOSIS — M25.532 PAIN IN RIGHT WRIST: ICD-10-CM

## 2020-10-29 DIAGNOSIS — M25.531 PAIN IN RIGHT WRIST: ICD-10-CM

## 2020-10-29 PROCEDURE — 73100 X-RAY EXAM OF WRIST: CPT | Mod: 50

## 2020-10-29 PROCEDURE — 99072 ADDL SUPL MATRL&STAF TM PHE: CPT

## 2020-10-29 PROCEDURE — 99213 OFFICE O/P EST LOW 20 MIN: CPT

## 2020-11-04 NOTE — HISTORY OF PRESENT ILLNESS
[de-identified] : Joe comes in today with bilateral wrist pain.  He states that he fell about five or six weeks ago while playing tennis.  He bruised his wrists and just wanted to make sure there were no fractures.

## 2020-11-04 NOTE — ADDENDUM
[FreeTextEntry1] : This note was dictated by Britt Lund, OTR/L, PA. \par \par This note was written by Sondra Vega on 11/03/2020 acting as scribe for Kalpesh Joy III, MD

## 2020-11-04 NOTE — PHYSICAL EXAM
[de-identified] : Right wrist:\par Wrist: Range of Motion in Degrees:\par 	                                                Claimant:	                Normal:	\par Dorsiflexion: (Active)               	90-degrees	90-degrees	\par Dorsiflexion: (Passive)	                90-degrees	90-degrees	\par Palmar flexion: (Active)              	90-degrees	90-degrees	\par Palmar flexion: (Passive)              	90-degrees	90-degrees	\par Radial & ulnar deviation(Active)	30-degrees	30-degrees	\par Radial & ulnar deviation(Passive)	30-degrees	30-degrees	\par Pronation/Supination:(Active)    	0-180 degrees	0-180 degrees	\par Pronation/Supination:(Passive)          	0-180 degrees	0-180 degrees	\par \par Neurovascular and neurologic examination is within normal limits  \par  \par Wrist: Range of Motion in Degrees:\par 	                                                Claimant:	                Normal:	\par Dorsiflexion: (Active)               	90-degrees	90-degrees	\par Dorsiflexion: (Passive)	                90-degrees	90-degrees	\par Palmar flexion: (Active)              	90-degrees	90-degrees	\par Palmar flexion: (Passive)              	90-degrees	90-degrees	\par Radial & ulnar deviation(Active)	30-degrees	30-degrees	\par Radial & ulnar deviation(Passive)	30-degrees	30-degrees	\par Pronation/Supination:(Active)    	0-180 degrees	0-180 degrees	\par Pronation/Supination:(Passive)          	0-180 degrees	0-180 degrees	\par \par Neurovascular and neurologic examination is within normal limits  \par  \par He has a full range of motion of his fingers.  His wrists have great strength. [de-identified] : Gait: normal    Station: normal  [de-identified] : Appearance: Well-developed, well-nourished male  in no acute distress.  [de-identified] : X-ray examination, two views of bilateral wrists, reveals no acute fractures or dislocations.

## 2020-11-04 NOTE — DISCUSSION/SUMMARY
[de-identified] : The patient presents with bilateral wrist pain.  The patient is advised he can continue all of his activities, return to work and return here as needed.

## 2021-08-18 ENCOUNTER — APPOINTMENT (OUTPATIENT)
Dept: GASTROENTEROLOGY | Facility: CLINIC | Age: 66
End: 2021-08-18
Payer: COMMERCIAL

## 2021-08-18 VITALS
BODY MASS INDEX: 28.71 KG/M2 | HEART RATE: 67 BPM | SYSTOLIC BLOOD PRESSURE: 145 MMHG | DIASTOLIC BLOOD PRESSURE: 88 MMHG | HEIGHT: 72 IN | WEIGHT: 212 LBS

## 2021-08-18 DIAGNOSIS — R10.9 UNSPECIFIED ABDOMINAL PAIN: ICD-10-CM

## 2021-08-18 PROCEDURE — 99214 OFFICE O/P EST MOD 30 MIN: CPT

## 2021-08-19 PROBLEM — R10.9 STOMACH DISCOMFORT: Status: ACTIVE | Noted: 2021-08-18

## 2021-08-19 NOTE — PHYSICAL EXAM
[General Appearance - Alert] : alert [General Appearance - In No Acute Distress] : in no acute distress [Sclera] : the sclera and conjunctiva were normal [PERRL With Normal Accommodation] : pupils were equal in size, round, and reactive to light [Extraocular Movements] : extraocular movements were intact [Abnormal Walk] : normal gait [FreeTextEntry1] : d [Nail Clubbing] : no clubbing  or cyanosis of the fingernails [Musculoskeletal - Swelling] : no joint swelling seen [Motor Tone] : muscle strength and tone were normal [Skin Turgor] : normal skin turgor [Skin Color & Pigmentation] : normal skin color and pigmentation [] : no rash [Motor Exam] : the motor exam was normal [No Focal Deficits] : no focal deficits [Oriented To Time, Place, And Person] : oriented to person, place, and time [Affect] : the affect was normal [Impaired Insight] : insight and judgment were intact

## 2021-08-19 NOTE — ASSESSMENT
[FreeTextEntry1] : 65 year old man with prior history of diverticulitis requiring surgery (L jake), here with chronic/worsening LLQ and RLQ pain and bloating. \par \par Will plan for EGD/Colonoscopy for his symptoms. Don't think this is acute diverticulitis but could have segemental colitis associated with diverticulosis. Will obtain a CT scan as well as he is 65 and don't want miss a pancreaticobiliary malignancy.

## 2021-08-19 NOTE — HISTORY OF PRESENT ILLNESS
[de-identified] : 65 year old man with prior history of diverticulitis requiring surgery (L jake), here with chronic/worsening LLQ and RLQ pain and bloating. \par \par Patient states that for the past few months he has been experiencing post prandial bloating. No nausea or vomiting, just feeling of fullness after meals. No weight loss, still good appetite. Not clear if its after certain foods. Also with LLQ and RLQ pain, crampy, comes and goes, daily, no radiation. Not associated with or relieved by BM's. No overt signs of bleeding. No change in BM's. Last endosocpic evalation was a few years ago (?Dr. Delgado). \par \par

## 2021-08-21 ENCOUNTER — APPOINTMENT (OUTPATIENT)
Dept: DISASTER EMERGENCY | Facility: CLINIC | Age: 66
End: 2021-08-21

## 2021-08-21 ENCOUNTER — LABORATORY RESULT (OUTPATIENT)
Age: 66
End: 2021-08-21

## 2021-08-21 DIAGNOSIS — Z01.818 ENCOUNTER FOR OTHER PREPROCEDURAL EXAMINATION: ICD-10-CM

## 2021-08-25 ENCOUNTER — APPOINTMENT (OUTPATIENT)
Dept: GASTROENTEROLOGY | Facility: AMBULATORY MEDICAL SERVICES | Age: 66
End: 2021-08-25
Payer: COMMERCIAL

## 2021-08-25 ENCOUNTER — RESULT REVIEW (OUTPATIENT)
Age: 66
End: 2021-08-25

## 2021-08-25 PROCEDURE — 45380 COLONOSCOPY AND BIOPSY: CPT

## 2021-08-25 PROCEDURE — 43239 EGD BIOPSY SINGLE/MULTIPLE: CPT

## 2021-08-27 ENCOUNTER — OUTPATIENT (OUTPATIENT)
Dept: OUTPATIENT SERVICES | Facility: HOSPITAL | Age: 66
LOS: 1 days | End: 2021-08-27
Payer: COMMERCIAL

## 2021-08-27 ENCOUNTER — APPOINTMENT (OUTPATIENT)
Dept: CT IMAGING | Facility: CLINIC | Age: 66
End: 2021-08-27
Payer: COMMERCIAL

## 2021-08-27 DIAGNOSIS — K57.92 DIVERTICULITIS OF INTESTINE, PART UNSPECIFIED, WITHOUT PERFORATION OR ABSCESS WITHOUT BLEEDING: ICD-10-CM

## 2021-08-27 DIAGNOSIS — Z90.49 ACQUIRED ABSENCE OF OTHER SPECIFIED PARTS OF DIGESTIVE TRACT: Chronic | ICD-10-CM

## 2021-08-27 DIAGNOSIS — Z98.890 OTHER SPECIFIED POSTPROCEDURAL STATES: Chronic | ICD-10-CM

## 2021-08-27 DIAGNOSIS — Z00.8 ENCOUNTER FOR OTHER GENERAL EXAMINATION: ICD-10-CM

## 2021-08-27 PROCEDURE — 74177 CT ABD & PELVIS W/CONTRAST: CPT

## 2021-08-27 PROCEDURE — 82565 ASSAY OF CREATININE: CPT

## 2021-08-27 PROCEDURE — 74177 CT ABD & PELVIS W/CONTRAST: CPT | Mod: 26

## 2021-11-23 ENCOUNTER — APPOINTMENT (OUTPATIENT)
Dept: GASTROENTEROLOGY | Facility: CLINIC | Age: 66
End: 2021-11-23

## 2022-06-22 ENCOUNTER — APPOINTMENT (OUTPATIENT)
Dept: UROLOGY | Facility: CLINIC | Age: 67
End: 2022-06-22
Payer: COMMERCIAL

## 2022-06-22 VITALS
WEIGHT: 214 LBS | HEART RATE: 82 BPM | HEIGHT: 72 IN | SYSTOLIC BLOOD PRESSURE: 124 MMHG | DIASTOLIC BLOOD PRESSURE: 83 MMHG | OXYGEN SATURATION: 98 % | BODY MASS INDEX: 28.99 KG/M2

## 2022-06-22 PROCEDURE — 51798 US URINE CAPACITY MEASURE: CPT

## 2022-06-22 PROCEDURE — 99204 OFFICE O/P NEW MOD 45 MIN: CPT | Mod: 25

## 2022-06-22 NOTE — LETTER BODY
[Dear  ___] : Dear  [unfilled], [Consult Letter:] : I had the pleasure of evaluating your patient, [unfilled]. [( Thank you for referring [unfilled] for consultation for _____ )] : Thank you for referring [unfilled] for consultation for [unfilled] [Please see my note below.] : Please see my note below. [Consult Closing:] : Thank you very much for allowing me to participate in the care of this patient.  If you have any questions, please do not hesitate to contact me. [Sincerely,] : Sincerely, [FreeTextEntry3] : Mina Yap MD\par  of Urology\par [facility]\par \par Offices:\par [facility] @\par [address], [place] [address]\par and\par [address], [place] [address], [address]\par and\par [address] [address]\par \par TEL: [phone]\par FAX: [phone]

## 2022-06-22 NOTE — REVIEW OF SYSTEMS
[Feeling Tired] : feeling tired [see HPI] : see HPI [Loss of interest] : loss of interest in sexual activity [Seen by urologist before (Name)  ___] : Preciously seen by a urologist: [unfilled] [Wake up at night to urinate  How many times?  ___] : wakes up to urinate [unfilled] times during the night [Strong urge to urinate] : strong urge to urinate [Wait a long time to urinate] : waits a long time to urinate [Slow urine stream] : slow urine stream [Negative] : Heme/Lymph

## 2022-06-22 NOTE — PHYSICAL EXAM
[Urethral Meatus] : meatus normal [Penis Abnormality] : normal circumcised penis [Scrotum] : the scrotum was normal [Normal Appearance] : normal appearance [General Appearance - In No Acute Distress] : no acute distress [] : no respiratory distress [Abdomen Soft] : soft [Abdomen Tenderness] : non-tender [Costovertebral Angle Tenderness] : no ~M costovertebral angle tenderness [Normal Station and Gait] : the gait and station were normal for the patient's age [Skin Color & Pigmentation] : normal skin color and pigmentation [No Focal Deficits] : no focal deficits [Oriented To Time, Place, And Person] : oriented to person, place, and time [FreeTextEntry1] : left epididymal cyst, Prostate partially palpated(mid), non tender, no nodule in the palpated part

## 2022-06-22 NOTE — HISTORY OF PRESENT ILLNESS
[FreeTextEntry1] : 66 year old male presents for history of Benign Prostatic Hyperplasia. \par Started taking Flomax 1 week ago, never took Finasteride. Was taking Prostate supplements. \par Last week had difficulty urinating: slow flow, hesitancy, frequency and urgency, started left over Flomax. Urinating better. \par Sits to urinate, stream splayed, daytime frequency 2-3 hours, nocturia 1 x. \par No hesitancy or urinary incontinence. Has off and on sense of incomplete emptying.\par Denies dysuria, hematuria, lower abdominal or flank pain, nausea, vomiting, fever, chills or rigors. \par Rates erections 3-4/5. Weaker since starting Flomax. Able to attain, maintain and penetrate. \par Normal libido and decreased or no ejaculation since starting Flomax. \par Has history of Elevated PSA. No Prostate biopsy. Has had MRI Prostate. Prostate volume: 66 cc. No MRI suspicious lesion. \par Per Patient recent PSA: 3.8 about a year ago. \par No family history of Prostate cancer. \par \par Has seen Mckay Tavera and Graciela in the past.

## 2022-06-22 NOTE — ASSESSMENT
[FreeTextEntry1] : Benign Prostatic Hyperplasia:\par PVR: 195 ml\par Will get Urinalysis with reflex Urine culture. \par Discussed treatment options mainly: continued medical management with alpha blocker and or 5 alpha reductase inhibitor Vs Clean intermittent catheterization/Indwelling Rios catheter Vs Surgery: Transurethral resection/vaporization/ablation of Prostate and Simple prostatectomy: open Vs robotic after appropriate work up.\par Also discussed emerging minimally invasive surgical therapies: Prostatic urethral lift (Urolift) and Water vapor thermal therapy (Rezum). \par Discussed risks and benefits of each. \par Patient will consider his options. \par Will continue Flomax for now. \par \par Elevated PSA:\par Discussed with the patient that PSA is a substance produced by the prostate gland. Elevated PSA levels may indicate a noncancerous condition such as prostatitis, or an enlarged prostate but it may also indicate prostate cancer.\par Discussed PSA screening and latest recommendations/guidelines- USPTF and AUA. \par Explained that only way to rule out Prostate cancer in a patient with elevated PSA, increased PSA velocity or abnormal digital rectal exam is to do Prostate needle biopsy.\par Total and Free PSA 1 week before next appointment.\par \par Epididymal cyst:\par Will get Scrotal Ultrasound. \par \par Return to office in 2 months or sooner if any issues: will do Uroflo/PVR.

## 2022-06-23 LAB
APPEARANCE: CLEAR
BILIRUBIN URINE: NEGATIVE
BLOOD URINE: NEGATIVE
COLOR: YELLOW
GLUCOSE QUALITATIVE U: NEGATIVE
KETONES URINE: NEGATIVE
LEUKOCYTE ESTERASE URINE: NEGATIVE
NITRITE URINE: NEGATIVE
PH URINE: 5.5
PROTEIN URINE: NORMAL
SPECIFIC GRAVITY URINE: 1.02
UROBILINOGEN URINE: NORMAL

## 2022-08-02 NOTE — PROVIDER CONTACT NOTE (OTHER) - NAME OF MD/NP/PA/DO NOTIFIED:
Initial Anesthesia Post-op Note    Patient: Víctor Gaspar  Procedure(s) Performed: CHOLECYSTECTOMY, LAPAROSCOPIC  Anesthesia type: General    Vitals Value Taken Time   Temp 36.5 °C (97.7 °F) 08/02/22 1352   Pulse 70 08/02/22 1352   Resp 16 08/02/22 1352   SpO2 96 % 08/02/22 1352   /65 08/02/22 1352         Patient Location: PACU Phase 1  Post-op Vital Signs:stable  Level of Consciousness: responds to stimulation, sedated and oriented  Respiratory Status: spontaneous ventilation  Cardiovascular blood pressure returned to baseline  Pain Management: well controlled  Handoff: Handoff to receiving clinician was performed and questions were answered  Vomiting: none  Nausea: None  Airway Patency:patent  Post-op Assessment: no complications, patient tolerated procedure well with no complications, no evidence of recall and dentition within defined limits      No complications documented.   Dr. Palla

## 2022-08-08 ENCOUNTER — RESULT REVIEW (OUTPATIENT)
Age: 67
End: 2022-08-08

## 2022-08-08 ENCOUNTER — OUTPATIENT (OUTPATIENT)
Dept: OUTPATIENT SERVICES | Facility: HOSPITAL | Age: 67
LOS: 1 days | End: 2022-08-08
Payer: COMMERCIAL

## 2022-08-08 ENCOUNTER — APPOINTMENT (OUTPATIENT)
Dept: ULTRASOUND IMAGING | Facility: CLINIC | Age: 67
End: 2022-08-08

## 2022-08-08 DIAGNOSIS — N50.3 CYST OF EPIDIDYMIS: ICD-10-CM

## 2022-08-08 DIAGNOSIS — Z90.49 ACQUIRED ABSENCE OF OTHER SPECIFIED PARTS OF DIGESTIVE TRACT: Chronic | ICD-10-CM

## 2022-08-08 DIAGNOSIS — Z98.890 OTHER SPECIFIED POSTPROCEDURAL STATES: Chronic | ICD-10-CM

## 2022-08-08 PROCEDURE — 76870 US EXAM SCROTUM: CPT

## 2022-08-08 PROCEDURE — 93975 VASCULAR STUDY: CPT | Mod: 26

## 2022-08-08 PROCEDURE — 93975 VASCULAR STUDY: CPT

## 2022-08-08 PROCEDURE — 76870 US EXAM SCROTUM: CPT | Mod: 26

## 2022-08-24 ENCOUNTER — APPOINTMENT (OUTPATIENT)
Dept: UROLOGY | Facility: CLINIC | Age: 67
End: 2022-08-24

## 2022-08-24 DIAGNOSIS — N50.3 CYST OF EPIDIDYMIS: ICD-10-CM

## 2022-08-24 PROCEDURE — 99214 OFFICE O/P EST MOD 30 MIN: CPT

## 2022-08-24 NOTE — ASSESSMENT
[FreeTextEntry1] : Reviewed outside records. \par PSA: 4.8; Free%: 27.5(7/15/22)<--2.8(8/18/21)<--4.3(1/19/17).\par \par MRI Prostate(4/17/17):\par Prostate volume: 66 cc. \par No MRI suspicious lesion. \par Patchy inflammatory type enhancement throughout the right peripheral zone. \par PIRADS 1.  \par \par Epididymal cyst:\par Discussed Scrotal Ultrasound findings. \par \par Benign Prostatic Hyperplasia:\par Discussed treatment options, will continue Flomax. \par \par Elevated PSA:\par Discussed treatment options. Will continue PSA monitoring. \par Total and Free PSA 1 week before next appointment. \par \par Return to office in 3 months or sooner if any issues: will do Uroflo/PVR.

## 2022-08-24 NOTE — LETTER BODY
[Dear  ___] : Dear  [unfilled], [Courtesy Letter:] : I had the pleasure of seeing your patient, [unfilled], in my office today. [Please see my note below.] : Please see my note below. [Sincerely,] : Sincerely, [FreeTextEntry3] : Mina Yap MD\par  of Urology\par [facility]\par \par [facility]\par Offices:\par [address], [facility]\par [address] [address]\par [address] [facility], [address]\par \par TEL: [phone]\par FAX: [phone]

## 2022-08-24 NOTE — LETTER BODY
[Dear  ___] : Dear  [unfilled], [Courtesy Letter:] : I had the pleasure of seeing your patient, [unfilled], in my office today. [Please see my note below.] : Please see my note below. [Sincerely,] : Sincerely, [FreeTextEntry3] : Mina Yap MD\par  of Urology\par [facility]\par \par [facility]\par Offices:\par [address], [facility]\par [address] [address]\par [address] [facility], [address]\par \par TEL: [phone]\par FAX: [phone]

## 2022-08-24 NOTE — HISTORY OF PRESENT ILLNESS
[FreeTextEntry1] : 66 year old male presents for follow up \par Taking Flomax, has reasonable stream, daytime frequency every 2-3 hours or so and no nocturia. \par No longer has sense of incomplete emptying. \par Had Scrotal Ultrasound. \par \par Seen on 6/22/22 for history of Benign Prostatic Hyperplasia. \par Started taking Flomax 1 week ago, never took Finasteride. Was taking Prostate supplements. \par Had difficulty urinating: slow flow, hesitancy, frequency and urgency, started left over Flomax. Urinating better. \par Sits to urinate, stream splayed, daytime frequency 2-3 hours, nocturia 1 x. \par No hesitancy or urinary incontinence. Had off and on sense of incomplete emptying.\par Denied dysuria, hematuria, lower abdominal or flank pain, nausea, vomiting, fever, chills or rigors. \par Rated erections 3-4/5. Weaker since starting Flomax. Able to attain, maintain and penetrate. \par Normal libido and decreased or no ejaculation since starting Flomax. \par Has history of Elevated PSA. No Prostate biopsy. Has had MRI Prostate. Prostate volume: 66 cc. No MRI suspicious lesion. \par Per Patient recent PSA: 3.8 about a year ago. \par No family history of Prostate cancer. \par \par Has seen Mckay Tavera and Graciela in the past.

## 2022-09-23 ENCOUNTER — APPOINTMENT (OUTPATIENT)
Dept: UROLOGY | Facility: CLINIC | Age: 67
End: 2022-09-23

## 2022-09-23 VITALS
DIASTOLIC BLOOD PRESSURE: 95 MMHG | WEIGHT: 224 LBS | HEIGHT: 72 IN | SYSTOLIC BLOOD PRESSURE: 148 MMHG | BODY MASS INDEX: 30.34 KG/M2 | OXYGEN SATURATION: 96 % | HEART RATE: 84 BPM

## 2022-09-23 LAB
PSA FREE FLD-MCNC: 32 %
PSA FREE SERPL-MCNC: 1.35 NG/ML
PSA SERPL-MCNC: 4.23 NG/ML

## 2022-09-23 PROCEDURE — 51798 US URINE CAPACITY MEASURE: CPT

## 2022-09-23 PROCEDURE — 51741 ELECTRO-UROFLOWMETRY FIRST: CPT

## 2022-09-23 PROCEDURE — 99214 OFFICE O/P EST MOD 30 MIN: CPT | Mod: 25

## 2022-09-27 LAB
APPEARANCE: CLEAR
BILIRUBIN URINE: NEGATIVE
BLOOD URINE: NEGATIVE
COLOR: YELLOW
GLUCOSE QUALITATIVE U: NEGATIVE
KETONES URINE: NEGATIVE
LEUKOCYTE ESTERASE URINE: NEGATIVE
NITRITE URINE: NEGATIVE
PH URINE: 6
PROTEIN URINE: NEGATIVE
SPECIFIC GRAVITY URINE: 1.02
UROBILINOGEN URINE: NORMAL

## 2022-09-29 NOTE — ASSESSMENT
[FreeTextEntry1] : Reviewed records, discussed labs and imaging. \par \par Benign Prostatic Hyperplasia:\par Will get Urinalysis with reflex Urine culture. \par See Uroflo and PVR. \par Discussed treatment options mainly: continued medical management with alpha blocker and or 5 alpha reductase inhibitor Vs Clean intermittent catheterization/Indwelling Rios catheter Vs Surgery: Transurethral resection/vaporization/ablation of Prostate and Simple prostatectomy: open Vs robotic after appropriate work up.\par Also discussed emerging minimally invasive surgical therapies: Prostatic urethral lift (Urolift) and Water vapor thermal therapy (Rezum). \par Discussed risks and benefits of each. \par Patient will like to proceed with work up for further management. \par \par Elevated PSA:\par Will get MRI Prostate before surgery for Prostate. \par  \par Return to clinic for next available Cystoscopy and Prostate Ultrasound.

## 2022-09-29 NOTE — HISTORY OF PRESENT ILLNESS
[FreeTextEntry1] : 66 year old male presents for follow up \par Will like to proceed with minimally invasive surgical therapy for Benign Prostatic Hyperplasia. \par \par Seen on 8/24/22. \par On Flomax, had reasonable stream, daytime frequency every 2-3 hours or so and no nocturia. \par No longer had sense of incomplete emptying. \par Had Scrotal Ultrasound. \par \par Seen on 6/22/22 for history of Benign Prostatic Hyperplasia. \par Started taking Flomax 1 week ago, never took Finasteride. Was taking Prostate supplements. \par Had difficulty urinating: slow flow, hesitancy, frequency and urgency, started left over Flomax. Urinating better. \par Sits to urinate, stream splayed, daytime frequency 2-3 hours, nocturia 1 x. \par No hesitancy or urinary incontinence. Had off and on sense of incomplete emptying.\par Denied dysuria, hematuria, lower abdominal or flank pain, nausea, vomiting, fever, chills or rigors. \par Rated erections 3-4/5. Weaker since starting Flomax. Able to attain, maintain and penetrate. \par Normal libido and decreased or no ejaculation since starting Flomax. \par Has history of Elevated PSA. No Prostate biopsy. Has had MRI Prostate. Prostate volume: 66 cc. No MRI suspicious lesion. \par Per Patient recent PSA: 3.8 about a year ago. \par No family history of Prostate cancer. \par \par Has seen Mckay Tavera and Graciela in the past.

## 2022-09-29 NOTE — LETTER BODY
[Dear  ___] : Dear  [unfilled], [Courtesy Letter:] : I had the pleasure of seeing your patient, [unfilled], in my office today. [Please see my note below.] : Please see my note below. [Sincerely,] : Sincerely, [FreeTextEntry3] : Mina Yap MD\par  of Urology\par [facility]\par \par [facility]\par Offices:\par [address], [facility]\par [address] [address]\par [address] [facility], [address]\par \par TEL: [phone]\par FAX: [phone]

## 2022-10-14 ENCOUNTER — APPOINTMENT (OUTPATIENT)
Dept: UROLOGY | Facility: CLINIC | Age: 67
End: 2022-10-14

## 2022-10-14 VITALS
WEIGHT: 224 LBS | BODY MASS INDEX: 30.34 KG/M2 | HEIGHT: 72 IN | SYSTOLIC BLOOD PRESSURE: 150 MMHG | OXYGEN SATURATION: 97 % | HEART RATE: 74 BPM | DIASTOLIC BLOOD PRESSURE: 98 MMHG

## 2022-10-14 PROCEDURE — 99213 OFFICE O/P EST LOW 20 MIN: CPT | Mod: 25

## 2022-10-14 PROCEDURE — 52000 CYSTOURETHROSCOPY: CPT

## 2022-10-15 NOTE — HISTORY OF PRESENT ILLNESS
[FreeTextEntry1] : 66 year old male presents for follow up.\par Taking Flomax, has reasonable stream, daytime frequency every 2-3 hours or so and no nocturia. \par Denies dysuria, hematuria, lower abdominal or flank pain, nausea, vomiting, fever, chills or rigors. \par \par Seen on 9/23/22. Decided to proceed with Transurethral water vapor thermotherapy. \par \par Seen on 8/24/22. On Flomax. Improved urination. \par \par Seen on 6/22/22 for history of Benign Prostatic Hyperplasia. \par Started taking Flomax 1 week ago, never took Finasteride. Was taking Prostate supplements. \par Had difficulty urinating: slow flow, hesitancy, frequency and urgency, started left over Flomax. Urinating better. \par Sits to urinate, stream splayed, daytime frequency 2-3 hours, nocturia 1 x. \par No hesitancy or urinary incontinence. Had off and on sense of incomplete emptying.\par Denied dysuria, hematuria, lower abdominal or flank pain, nausea, vomiting, fever, chills or rigors. \par Rated erections 3-4/5. Weaker since starting Flomax. Able to attain, maintain and penetrate. \par Normal libido and decreased or no ejaculation since starting Flomax. \par Has history of Elevated PSA. No Prostate biopsy. Has had MRI Prostate. Prostate volume: 66 cc. No MRI suspicious lesion. \par Per Patient recent PSA: 3.8 about a year ago. \par No family history of Prostate cancer. \par \par Has seen Mckay Tavera and Graciela in the past.

## 2022-10-15 NOTE — LETTER BODY
[Dear  ___] : Dear  [unfilled], [Courtesy Letter:] : I had the pleasure of seeing your patient, [unfilled], in my office today. [Please see my note below.] : Please see my note below. [Sincerely,] : Sincerely, [FreeTextEntry3] : Mina Yap MD\par  of Urology\par [facility] [facility] [employer]\par \par [facility]\par Offices:\par [address] [facility], [facility]\par [address] [address]\par [address] [facility], [address]\par \par TEL: [phone]\par FAX: [phone]

## 2022-10-15 NOTE — ASSESSMENT
[FreeTextEntry1] : Benign Prostatic Hyperplasia:\par Had Cystoscopy today: Trilobar prostatic enlargement. \par Again discussed treatment options. Patient interested in Rezum: Transurethral water vapor thermotherapy. Discussed its a newer technique with limited data. Discussed risks, benefits and alternatives. Discussed the procedure and the post operative course. \par Patient wants to proceed under general anesthesia. \par \par Elevated PSA:\par WIll get MRI Prostate. If negative for suspicious lesion, will schedule for Transurethral water vapor thermotherapy. \par Will inform results.

## 2022-10-26 ENCOUNTER — RESULT REVIEW (OUTPATIENT)
Age: 67
End: 2022-10-26

## 2022-10-26 ENCOUNTER — APPOINTMENT (OUTPATIENT)
Dept: MRI IMAGING | Facility: CLINIC | Age: 67
End: 2022-10-26

## 2022-10-26 ENCOUNTER — OUTPATIENT (OUTPATIENT)
Dept: OUTPATIENT SERVICES | Facility: HOSPITAL | Age: 67
LOS: 1 days | End: 2022-10-26
Payer: COMMERCIAL

## 2022-10-26 DIAGNOSIS — Z98.890 OTHER SPECIFIED POSTPROCEDURAL STATES: Chronic | ICD-10-CM

## 2022-10-26 DIAGNOSIS — R97.20 ELEVATED PROSTATE SPECIFIC ANTIGEN [PSA]: ICD-10-CM

## 2022-10-26 DIAGNOSIS — Z90.49 ACQUIRED ABSENCE OF OTHER SPECIFIED PARTS OF DIGESTIVE TRACT: Chronic | ICD-10-CM

## 2022-10-26 DIAGNOSIS — Z00.8 ENCOUNTER FOR OTHER GENERAL EXAMINATION: ICD-10-CM

## 2022-10-26 PROCEDURE — 72197 MRI PELVIS W/O & W/DYE: CPT | Mod: 26

## 2022-10-26 PROCEDURE — 76498 UNLISTED MR PROCEDURE: CPT

## 2022-10-26 PROCEDURE — A9585: CPT

## 2022-10-26 PROCEDURE — 72197 MRI PELVIS W/O & W/DYE: CPT

## 2022-10-26 PROCEDURE — 76498P: CUSTOM | Mod: 26

## 2022-11-01 ENCOUNTER — NON-APPOINTMENT (OUTPATIENT)
Age: 67
End: 2022-11-01

## 2022-11-05 ENCOUNTER — NON-APPOINTMENT (OUTPATIENT)
Age: 67
End: 2022-11-05

## 2022-11-30 ENCOUNTER — OUTPATIENT (OUTPATIENT)
Dept: OUTPATIENT SERVICES | Facility: HOSPITAL | Age: 67
LOS: 1 days | End: 2022-11-30

## 2022-11-30 VITALS
DIASTOLIC BLOOD PRESSURE: 88 MMHG | SYSTOLIC BLOOD PRESSURE: 139 MMHG | OXYGEN SATURATION: 96 % | HEART RATE: 76 BPM | TEMPERATURE: 98 F | HEIGHT: 72 IN | RESPIRATION RATE: 18 BRPM | WEIGHT: 218.04 LBS

## 2022-11-30 DIAGNOSIS — Z90.49 ACQUIRED ABSENCE OF OTHER SPECIFIED PARTS OF DIGESTIVE TRACT: Chronic | ICD-10-CM

## 2022-11-30 DIAGNOSIS — N13.8 OTHER OBSTRUCTIVE AND REFLUX UROPATHY: ICD-10-CM

## 2022-11-30 DIAGNOSIS — N40.1 BENIGN PROSTATIC HYPERPLASIA WITH LOWER URINARY TRACT SYMPTOMS: ICD-10-CM

## 2022-11-30 DIAGNOSIS — Z87.19 PERSONAL HISTORY OF OTHER DISEASES OF THE DIGESTIVE SYSTEM: Chronic | ICD-10-CM

## 2022-11-30 DIAGNOSIS — I10 ESSENTIAL (PRIMARY) HYPERTENSION: ICD-10-CM

## 2022-11-30 DIAGNOSIS — Z98.890 OTHER SPECIFIED POSTPROCEDURAL STATES: Chronic | ICD-10-CM

## 2022-11-30 LAB
ANION GAP SERPL CALC-SCNC: 15 MMOL/L — HIGH (ref 7–14)
APPEARANCE UR: CLEAR — SIGNIFICANT CHANGE UP
BACTERIA # UR AUTO: NEGATIVE — SIGNIFICANT CHANGE UP
BILIRUB UR-MCNC: NEGATIVE — SIGNIFICANT CHANGE UP
BUN SERPL-MCNC: 18 MG/DL — SIGNIFICANT CHANGE UP (ref 7–23)
CALCIUM SERPL-MCNC: 9.8 MG/DL — SIGNIFICANT CHANGE UP (ref 8.4–10.5)
CHLORIDE SERPL-SCNC: 103 MMOL/L — SIGNIFICANT CHANGE UP (ref 98–107)
CO2 SERPL-SCNC: 23 MMOL/L — SIGNIFICANT CHANGE UP (ref 22–31)
COLOR SPEC: YELLOW — SIGNIFICANT CHANGE UP
CREAT SERPL-MCNC: 0.92 MG/DL — SIGNIFICANT CHANGE UP (ref 0.5–1.3)
DIFF PNL FLD: NEGATIVE — SIGNIFICANT CHANGE UP
EGFR: 90 ML/MIN/1.73M2 — SIGNIFICANT CHANGE UP
EPI CELLS # UR: 1 /HPF — SIGNIFICANT CHANGE UP (ref 0–5)
GLUCOSE SERPL-MCNC: 78 MG/DL — SIGNIFICANT CHANGE UP (ref 70–99)
GLUCOSE UR QL: NEGATIVE — SIGNIFICANT CHANGE UP
HCT VFR BLD CALC: 47.9 % — SIGNIFICANT CHANGE UP (ref 39–50)
HGB BLD-MCNC: 16.7 G/DL — SIGNIFICANT CHANGE UP (ref 13–17)
HYALINE CASTS # UR AUTO: 2 /LPF — SIGNIFICANT CHANGE UP (ref 0–7)
KETONES UR-MCNC: NEGATIVE — SIGNIFICANT CHANGE UP
LEUKOCYTE ESTERASE UR-ACNC: ABNORMAL
MCHC RBC-ENTMCNC: 30.3 PG — SIGNIFICANT CHANGE UP (ref 27–34)
MCHC RBC-ENTMCNC: 34.9 GM/DL — SIGNIFICANT CHANGE UP (ref 32–36)
MCV RBC AUTO: 86.8 FL — SIGNIFICANT CHANGE UP (ref 80–100)
NITRITE UR-MCNC: NEGATIVE — SIGNIFICANT CHANGE UP
NRBC # BLD: 0 /100 WBCS — SIGNIFICANT CHANGE UP (ref 0–0)
NRBC # FLD: 0 K/UL — SIGNIFICANT CHANGE UP (ref 0–0)
PH UR: 5.5 — SIGNIFICANT CHANGE UP (ref 5–8)
PLATELET # BLD AUTO: 179 K/UL — SIGNIFICANT CHANGE UP (ref 150–400)
POTASSIUM SERPL-MCNC: 3.5 MMOL/L — SIGNIFICANT CHANGE UP (ref 3.5–5.3)
POTASSIUM SERPL-SCNC: 3.5 MMOL/L — SIGNIFICANT CHANGE UP (ref 3.5–5.3)
PROT UR-MCNC: ABNORMAL
RBC # BLD: 5.52 M/UL — SIGNIFICANT CHANGE UP (ref 4.2–5.8)
RBC # FLD: 13.2 % — SIGNIFICANT CHANGE UP (ref 10.3–14.5)
RBC CASTS # UR COMP ASSIST: 1 /HPF — SIGNIFICANT CHANGE UP (ref 0–4)
SODIUM SERPL-SCNC: 141 MMOL/L — SIGNIFICANT CHANGE UP (ref 135–145)
SP GR SPEC: 1.02 — SIGNIFICANT CHANGE UP (ref 1.01–1.05)
UROBILINOGEN FLD QL: SIGNIFICANT CHANGE UP
WBC # BLD: 9.16 K/UL — SIGNIFICANT CHANGE UP (ref 3.8–10.5)
WBC # FLD AUTO: 9.16 K/UL — SIGNIFICANT CHANGE UP (ref 3.8–10.5)
WBC UR QL: 15 /HPF — HIGH (ref 0–5)

## 2022-11-30 NOTE — H&P PST ADULT - NSICDXPASTMEDICALHX_GEN_ALL_CORE_FT
PAST MEDICAL HISTORY:  BPH (benign prostatic hyperplasia)     Diverticulosis     Hypertension     Other obstructive and reflux uropathy

## 2022-11-30 NOTE — H&P PST ADULT - PROBLEM SELECTOR PLAN 1
Patient tentatively scheduled for  transurethral water vapor thermotherapy on 12/15/22    Pre-op instructions provided. Pt given verbal and written instructions with teach back on Pepcid. Pt verbalized understanding with return demonstration.    Pt has a scheduled preop COVID test.

## 2022-11-30 NOTE — H&P PST ADULT - PROBLEM SELECTOR PLAN 2
Patient instructed to take metoprolol ,Amlodipine and Triamterene and -HCTZ with a sip of water on the morning of procedure.    risk for JOSE MANUEL

## 2022-11-30 NOTE — H&P PST ADULT - NSANTHOSAYNRD_GEN_A_CORE
No. JOSE MANUEL screening performed.  STOP BANG Legend: 0-2 = LOW Risk; 3-4 = INTERMEDIATE Risk; 5-8 = HIGH Risk

## 2022-11-30 NOTE — H&P PST ADULT - HISTORY OF PRESENT ILLNESS
Patient is 67 year old male with preop dx of enlarged prostate with lower urinary tract symptoms . Patient is scheduled for transurethral water vapor thermotherapy .

## 2022-12-01 LAB
CULTURE RESULTS: SIGNIFICANT CHANGE UP
SPECIMEN SOURCE: SIGNIFICANT CHANGE UP

## 2022-12-14 ENCOUNTER — TRANSCRIPTION ENCOUNTER (OUTPATIENT)
Age: 67
End: 2022-12-14

## 2022-12-15 ENCOUNTER — TRANSCRIPTION ENCOUNTER (OUTPATIENT)
Age: 67
End: 2022-12-15

## 2022-12-15 ENCOUNTER — APPOINTMENT (OUTPATIENT)
Dept: UROLOGY | Facility: AMBULATORY SURGERY CENTER | Age: 67
End: 2022-12-15

## 2022-12-15 ENCOUNTER — OUTPATIENT (OUTPATIENT)
Dept: OUTPATIENT SERVICES | Facility: HOSPITAL | Age: 67
LOS: 1 days | Discharge: HOME | End: 2022-12-15

## 2022-12-15 VITALS
DIASTOLIC BLOOD PRESSURE: 64 MMHG | SYSTOLIC BLOOD PRESSURE: 118 MMHG | HEART RATE: 82 BPM | OXYGEN SATURATION: 99 % | TEMPERATURE: 98 F | RESPIRATION RATE: 14 BRPM

## 2022-12-15 VITALS
OXYGEN SATURATION: 96 % | WEIGHT: 218.04 LBS | TEMPERATURE: 98 F | RESPIRATION RATE: 18 BRPM | HEIGHT: 72 IN | SYSTOLIC BLOOD PRESSURE: 155 MMHG | DIASTOLIC BLOOD PRESSURE: 98 MMHG | HEART RATE: 69 BPM

## 2022-12-15 DIAGNOSIS — Z98.890 OTHER SPECIFIED POSTPROCEDURAL STATES: Chronic | ICD-10-CM

## 2022-12-15 DIAGNOSIS — N13.8 OTHER OBSTRUCTIVE AND REFLUX UROPATHY: ICD-10-CM

## 2022-12-15 DIAGNOSIS — Z90.49 ACQUIRED ABSENCE OF OTHER SPECIFIED PARTS OF DIGESTIVE TRACT: Chronic | ICD-10-CM

## 2022-12-15 DIAGNOSIS — Z87.19 PERSONAL HISTORY OF OTHER DISEASES OF THE DIGESTIVE SYSTEM: Chronic | ICD-10-CM

## 2022-12-15 PROCEDURE — 53854 TRURL DSTRJ PRST8 TISS RF WV: CPT

## 2022-12-15 DEVICE — SYS DELIVERY REZUM DEVICE: Type: IMPLANTABLE DEVICE | Status: FUNCTIONAL

## 2022-12-15 RX ORDER — PHENAZOPYRIDINE HCL 100 MG
200 TABLET ORAL ONCE
Refills: 0 | Status: DISCONTINUED | OUTPATIENT
Start: 2022-12-15 | End: 2022-12-29

## 2022-12-15 RX ORDER — PHENAZOPYRIDINE HCL 100 MG
1 TABLET ORAL
Qty: 9 | Refills: 0
Start: 2022-12-15 | End: 2022-12-17

## 2022-12-15 RX ORDER — TRIAMTERENE/HYDROCHLOROTHIAZID 75 MG-50MG
0 TABLET ORAL
Qty: 0 | Refills: 0 | DISCHARGE

## 2022-12-15 NOTE — PACU DISCHARGE NOTE - NS MD DISCHARGE NOTE DISCHARGE
Get mor information.  Where is it how bad is the vomiting when did it happen is it betteror worse Home

## 2022-12-15 NOTE — BRIEF OPERATIVE NOTE - NSICDXBRIEFPROCEDURE_GEN_ALL_CORE_FT
PROCEDURES:  Transurethral destruction of prostate tissue using water vapor thermotherapy 15-Dec-2022 14:04:05  Mina Yap

## 2022-12-15 NOTE — ASU PREOP CHECKLIST - AS BP NONINV METHOD
electronic
You can access the FollowMyHealth Patient Portal offered by [facility] by registering at the following website: [email or web address]. By joining [employer]’s FollowMyHealth portal, you will also be able to view your health information using other applications (apps) compatible with our system.

## 2022-12-15 NOTE — BRIEF OPERATIVE NOTE - NSICDXBRIEFPOSTOP_GEN_ALL_CORE_FT
POST-OP DIAGNOSIS:  BPH with obstruction/lower urinary tract symptoms 15-Dec-2022 14:04:25  Mina Yap S

## 2022-12-15 NOTE — BRIEF OPERATIVE NOTE - NSICDXBRIEFPREOP_GEN_ALL_CORE_FT
PRE-OP DIAGNOSIS:  BPH with obstruction/lower urinary tract symptoms 15-Dec-2022 14:04:15  Mina Yap S

## 2022-12-15 NOTE — ASU DISCHARGE PLAN (ADULT/PEDIATRIC) - CALL YOUR DOCTOR IF YOU HAVE ANY OF THE FOLLOWING:
If catheter stops draining, leaking around the catheter or severe lower abdominal pain/Bleeding that does not stop/Pain not relieved by Medications/Fever greater than (need to indicate Fahrenheit or Celsius)/Nausea and vomiting that does not stop/Inability to tolerate liquids or foods/Increased irritability or sluggishness

## 2022-12-15 NOTE — ASU DISCHARGE PLAN (ADULT/PEDIATRIC) - NS MD DC FALL RISK RISK
For information on Fall & Injury Prevention, visit: [email or web address] OR  [email or web address] OR  https://www.cdc.gov/steadi/patient.html

## 2022-12-15 NOTE — BRIEF OPERATIVE NOTE - TYPE OF ANESTHESIA
Ventricular Rate : 79  Atrial Rate : 79  P-R Interval : 176  QRS Duration : 102  Q-T Interval : 356  QTC Calculation(Bezet) : 408  P Axis : 59  R Axis : 4  T Axis : 61  Diagnosis : Normal sinus rhythm  Inferior infarct  Abnormal ECG  No previous ECGs available  Confirmed by LUIS M FREDERICK ([id number]) on 1/24/2019 9:16:38 PM  
General

## 2022-12-15 NOTE — BRIEF OPERATIVE NOTE - OPERATION/FINDINGS
Tri lobar prostatic enlargement.   4 treatments on each lateral lobes and 2 treatments on median lobe.

## 2022-12-15 NOTE — ASU DISCHARGE PLAN (ADULT/PEDIATRIC) - CARE PROVIDER_API CALL
Mina Yap)  Urology  [address] [facility] [address] [facility], [address]nd [place] [address]  Phone: [phone]  Fax: [phone]  Scheduled Appointment: 12/21/2022

## 2022-12-21 ENCOUNTER — APPOINTMENT (OUTPATIENT)
Dept: UROLOGY | Facility: CLINIC | Age: 67
End: 2022-12-21
Payer: COMMERCIAL

## 2022-12-21 ENCOUNTER — EMERGENCY (EMERGENCY)
Facility: HOSPITAL | Age: 67
LOS: 0 days | Discharge: HOME | End: 2022-12-21
Attending: FAMILY MEDICINE
Payer: COMMERCIAL

## 2022-12-21 VITALS — HEIGHT: 72 IN | WEIGHT: 220.02 LBS

## 2022-12-21 VITALS
DIASTOLIC BLOOD PRESSURE: 76 MMHG | OXYGEN SATURATION: 99 % | HEART RATE: 86 BPM | SYSTOLIC BLOOD PRESSURE: 147 MMHG | RESPIRATION RATE: 16 BRPM | TEMPERATURE: 98 F

## 2022-12-21 DIAGNOSIS — Z87.438 PERSONAL HISTORY OF OTHER DISEASES OF MALE GENITAL ORGANS: ICD-10-CM

## 2022-12-21 DIAGNOSIS — Z98.890 OTHER SPECIFIED POSTPROCEDURAL STATES: Chronic | ICD-10-CM

## 2022-12-21 DIAGNOSIS — Z90.49 ACQUIRED ABSENCE OF OTHER SPECIFIED PARTS OF DIGESTIVE TRACT: ICD-10-CM

## 2022-12-21 DIAGNOSIS — Z98.890 OTHER SPECIFIED POSTPROCEDURAL STATES: ICD-10-CM

## 2022-12-21 DIAGNOSIS — Z90.49 ACQUIRED ABSENCE OF OTHER SPECIFIED PARTS OF DIGESTIVE TRACT: Chronic | ICD-10-CM

## 2022-12-21 DIAGNOSIS — Z87.19 PERSONAL HISTORY OF OTHER DISEASES OF THE DIGESTIVE SYSTEM: ICD-10-CM

## 2022-12-21 DIAGNOSIS — R33.9 RETENTION OF URINE, UNSPECIFIED: ICD-10-CM

## 2022-12-21 DIAGNOSIS — Z79.82 LONG TERM (CURRENT) USE OF ASPIRIN: ICD-10-CM

## 2022-12-21 DIAGNOSIS — I10 ESSENTIAL (PRIMARY) HYPERTENSION: ICD-10-CM

## 2022-12-21 DIAGNOSIS — Z87.19 PERSONAL HISTORY OF OTHER DISEASES OF THE DIGESTIVE SYSTEM: Chronic | ICD-10-CM

## 2022-12-21 PROBLEM — N40.0 BENIGN PROSTATIC HYPERPLASIA WITHOUT LOWER URINARY TRACT SYMPTOMS: Chronic | Status: ACTIVE | Noted: 2022-11-30

## 2022-12-21 PROBLEM — N13.8 OTHER OBSTRUCTIVE AND REFLUX UROPATHY: Chronic | Status: ACTIVE | Noted: 2022-11-30

## 2022-12-21 PROBLEM — K57.90 DIVERTICULOSIS OF INTESTINE, PART UNSPECIFIED, WITHOUT PERFORATION OR ABSCESS WITHOUT BLEEDING: Chronic | Status: ACTIVE | Noted: 2022-11-30

## 2022-12-21 LAB
APPEARANCE UR: CLEAR — SIGNIFICANT CHANGE UP
BACTERIA # UR AUTO: ABNORMAL
BILIRUB UR-MCNC: NEGATIVE — SIGNIFICANT CHANGE UP
COLOR SPEC: YELLOW — SIGNIFICANT CHANGE UP
DIFF PNL FLD: ABNORMAL
EPI CELLS # UR: SIGNIFICANT CHANGE UP
GLUCOSE UR QL: NEGATIVE — SIGNIFICANT CHANGE UP
KETONES UR-MCNC: ABNORMAL
LEUKOCYTE ESTERASE UR-ACNC: ABNORMAL
NITRITE UR-MCNC: NEGATIVE — SIGNIFICANT CHANGE UP
PH UR: 5 — SIGNIFICANT CHANGE UP (ref 5–8)
PROT UR-MCNC: 30 MG/DL
RBC CASTS # UR COMP ASSIST: ABNORMAL /HPF (ref 0–4)
SP GR SPEC: 1.02 — SIGNIFICANT CHANGE UP (ref 1.01–1.02)
UROBILINOGEN FLD QL: NEGATIVE — SIGNIFICANT CHANGE UP
WBC UR QL: SIGNIFICANT CHANGE UP /HPF (ref 0–5)

## 2022-12-21 PROCEDURE — A4216: CPT | Mod: NC

## 2022-12-21 PROCEDURE — 99284 EMERGENCY DEPT VISIT MOD MDM: CPT

## 2022-12-21 PROCEDURE — 99024 POSTOP FOLLOW-UP VISIT: CPT

## 2022-12-21 PROCEDURE — 51700 IRRIGATION OF BLADDER: CPT | Mod: 58

## 2022-12-21 PROCEDURE — 99283 EMERGENCY DEPT VISIT LOW MDM: CPT

## 2022-12-21 PROCEDURE — 81001 URINALYSIS AUTO W/SCOPE: CPT

## 2022-12-21 PROCEDURE — 51702 INSERT TEMP BLADDER CATH: CPT

## 2022-12-21 NOTE — ED STATDOCS - NSICDXPASTSURGICALHX_GEN_ALL_CORE_FT
PAST SURGICAL HISTORY:  History of cholecystectomy     History of cholecystectomy     History of diverticulitis resection 2012    History of inguinal hernia repair     S/P partial colectomy for diverticulitis

## 2022-12-21 NOTE — ED STATDOCS - NSICDXFAMILYHX_GEN_ALL_CORE_FT
FAMILY HISTORY:  Father  Still living? No  Family history of cardiac disorder in father, Age at diagnosis: 51-60  Family history of diabetes mellitus in father, Age at diagnosis: Age Unknown

## 2022-12-21 NOTE — ED ADULT TRIAGE NOTE - CHIEF COMPLAINT QUOTE
patient unable to urinate since this afternoon, complains of severe suprapubic pain/pressure worsening throughout evening. had vang catheter removed this morning at urologist dr. kerr office.

## 2022-12-21 NOTE — ED STATDOCS - NSICDXPASTMEDICALHX_GEN_ALL_CORE_FT
PAST MEDICAL HISTORY:  BPH (benign prostatic hyperplasia)     Diverticulitis     Diverticulosis     HTN (hypertension)     Hypertension     Other obstructive and reflux uropathy

## 2022-12-21 NOTE — ED STATDOCS - PATIENT PORTAL LINK FT
You can access the FollowMyHealth Patient Portal offered by [facility] by registering at the following website: [email or web address]. By joining "[employer]"’s FollowMyHealth portal, you will also be able to view your health information using other applications (apps) compatible with our system.

## 2022-12-21 NOTE — ED STATDOCS - OBJECTIVE STATEMENT
68 y/o male w/ PMHx of BPH presents to the ED s/p prostate surgery c/o urine retention. Pt had Rios catheter taken out 9am this morning by Dr. Enriquez and hasn't been able to urinate since. 68 y/o male w/ PMHx of BPH presents to the ED s/p prostate surgery c/o urine retention. Pt had Rios catheter taken out 9am this morning by Dr. Enriquez and hasn't been able to urinate since. No fever.

## 2022-12-21 NOTE — ED STATDOCS - NS ED ATTENDING STATEMENT MOD
Attending Only This was a shared visit with the VERÓNICA. I reviewed and verified the documentation and independently performed the documented:

## 2022-12-21 NOTE — ED STATDOCS - NSFOLLOWUPINSTRUCTIONS_ED_ALL_ED_FT
Acute Urinary Retention, Male       Acute urinary retention is when a person cannot pee (urinate) at all, or can only pee a little. This can come on all of a sudden. If it is not treated, it can lead to kidney problems or other serious problems.      What are the causes?    •A problem with the tube that drains the bladder (urethra).      •Problems with the nerves in the bladder.      •Tumors.      •Certain medicines.      •An infection.      •Having trouble pooping (constipation).        What increases the risk?    Older men are more at risk because their prostate gland may become larger as they age. Other conditions also can increase risk. These include:  •Diseases, such as multiple sclerosis.      •Injury to the spinal cord.      •Diabetes.      •A condition that affects the way the brain works, such as dementia.      •Holding back urine due to trauma or because you do not want to use the bathroom.        What are the signs or symptoms?    •Trouble peeing.      •Pain in the lower belly.        How is this treated?    Treatment for this condition may include:  •Medicines.      •Placing a thin, germ-free tube (catheter) into the bladder to drain pee out of the body.      •Therapy to treat mental health conditions.      •Treatment for conditions that may cause this.      If needed, you may be treated in the hospital for kidney problems or to manage other problems.      Follow these instructions at home:    Medicines     •Take over-the-counter and prescription medicines only as told by your doctor. Ask your doctor what medicines you should stay away from.       •If you were given an antibiotic medicine, take it as told by your doctor. Do not stop taking it, even if you start to feel better.      General instructions     • Do not smoke or use any products that contain nicotine or tobacco. If you need help quitting, ask your doctor.      •Drink enough fluid to keep your pee pale yellow.      •If you were sent home with a tube that drains the bladder, take care of it as told by your doctor.      •Watch for changes in your symptoms. Tell your doctor about them.      •If told, keep track of changes in your blood pressure at home. Tell your doctor about them.      •Keep all follow-up visits.        Contact a doctor if:    •You have spasms in your bladder that you cannot stop.      •You leak pee when you have spasms.        Get help right away if:    •You have chills or a fever.      •You have blood in your pee.    •You have a tube that drains pee from the bladder and these things happen:  •The tube stops draining pee.      •The tube falls out.          Summary    •Acute urinary retention is when you cannot pee at all or you pee too little.      •If this condition is not treated, it can lead to kidney problems or other serious problems.      •If you were sent home with a tube (catheter) that drains the bladder, take care of it as told by your doctor.      •Watch for changes in your symptoms. Tell your doctor about them.      This information is not intended to replace advice given to you by your health care provider. Make sure you discuss any questions you have with your health care provider.      Document Revised: 09/08/2021 Document Reviewed: 09/08/2021    Elsevier Patient Education © 2022 [employer] Inc[employer]

## 2022-12-21 NOTE — ED STATDOCS - CLINICAL SUMMARY MEDICAL DECISION MAKING FREE TEXT BOX
Pt with inability to urinate post prostate procedure after vang being removed by urologist. Reinsertion of vang.

## 2022-12-21 NOTE — ED STATDOCS - CARE PROVIDER_API CALL
Mina Yap)  Urology  [address] [facility] [address] [facility] [address] [facility], [address]nd [place] [address]  Phone: [phone]  Fax: [phone]  Established Patient  Follow Up Time:

## 2022-12-22 ENCOUNTER — NON-APPOINTMENT (OUTPATIENT)
Age: 67
End: 2022-12-22

## 2022-12-23 ENCOUNTER — APPOINTMENT (OUTPATIENT)
Dept: UROLOGY | Facility: CLINIC | Age: 67
End: 2022-12-23
Payer: COMMERCIAL

## 2022-12-23 DIAGNOSIS — R31.0 GROSS HEMATURIA: ICD-10-CM

## 2022-12-23 PROCEDURE — 51700 IRRIGATION OF BLADDER: CPT | Mod: 78

## 2022-12-23 PROCEDURE — 99024 POSTOP FOLLOW-UP VISIT: CPT

## 2022-12-23 PROCEDURE — A4216: CPT | Mod: NC

## 2022-12-28 ENCOUNTER — APPOINTMENT (OUTPATIENT)
Dept: UROLOGY | Facility: CLINIC | Age: 67
End: 2022-12-28
Payer: COMMERCIAL

## 2022-12-28 ENCOUNTER — APPOINTMENT (OUTPATIENT)
Dept: UROLOGY | Facility: CLINIC | Age: 67
End: 2022-12-28

## 2022-12-28 DIAGNOSIS — N40.0 BENIGN PROSTATIC HYPERPLASIA WITHOUT LOWER URINARY TRACT SYMPMS: ICD-10-CM

## 2022-12-28 PROCEDURE — 99024 POSTOP FOLLOW-UP VISIT: CPT

## 2022-12-28 PROCEDURE — 51702 INSERT TEMP BLADDER CATH: CPT | Mod: 78

## 2023-01-11 ENCOUNTER — RX RENEWAL (OUTPATIENT)
Age: 68
End: 2023-01-11

## 2023-01-11 ENCOUNTER — APPOINTMENT (OUTPATIENT)
Dept: UROLOGY | Facility: CLINIC | Age: 68
End: 2023-01-11
Payer: COMMERCIAL

## 2023-01-11 VITALS
BODY MASS INDEX: 30.34 KG/M2 | OXYGEN SATURATION: 95 % | SYSTOLIC BLOOD PRESSURE: 143 MMHG | HEART RATE: 84 BPM | RESPIRATION RATE: 17 BRPM | TEMPERATURE: 98.1 F | WEIGHT: 224 LBS | HEIGHT: 72 IN | DIASTOLIC BLOOD PRESSURE: 99 MMHG

## 2023-01-11 PROCEDURE — 51798 US URINE CAPACITY MEASURE: CPT

## 2023-01-11 PROCEDURE — 51700 IRRIGATION OF BLADDER: CPT | Mod: 78

## 2023-01-11 PROCEDURE — 99024 POSTOP FOLLOW-UP VISIT: CPT

## 2023-01-11 PROCEDURE — A4216: CPT | Mod: NC

## 2023-01-17 ENCOUNTER — NON-APPOINTMENT (OUTPATIENT)
Age: 68
End: 2023-01-17

## 2023-01-17 DIAGNOSIS — R30.0 DYSURIA: ICD-10-CM

## 2023-01-18 ENCOUNTER — APPOINTMENT (OUTPATIENT)
Dept: UROLOGY | Facility: CLINIC | Age: 68
End: 2023-01-18

## 2023-01-18 LAB
APPEARANCE: CLEAR
BACTERIA: NEGATIVE
BILIRUBIN URINE: NEGATIVE
BLOOD URINE: ABNORMAL
COLOR: YELLOW
GLUCOSE QUALITATIVE U: NEGATIVE
HYALINE CASTS: 1 /LPF
KETONES URINE: NEGATIVE
LEUKOCYTE ESTERASE URINE: ABNORMAL
MICROSCOPIC-UA: NORMAL
NITRITE URINE: NEGATIVE
PH URINE: 6
PROTEIN URINE: ABNORMAL
RED BLOOD CELLS URINE: 27 /HPF
SPECIFIC GRAVITY URINE: 1.03
SQUAMOUS EPITHELIAL CELLS: 0 /HPF
UROBILINOGEN URINE: NORMAL
WHITE BLOOD CELLS URINE: 19 /HPF

## 2023-01-20 LAB — BACTERIA UR CULT: NORMAL

## 2023-01-27 ENCOUNTER — APPOINTMENT (OUTPATIENT)
Dept: UROLOGY | Facility: CLINIC | Age: 68
End: 2023-01-27
Payer: COMMERCIAL

## 2023-01-27 PROCEDURE — 99024 POSTOP FOLLOW-UP VISIT: CPT

## 2023-01-29 NOTE — HISTORY OF PRESENT ILLNESS
[FreeTextEntry1] : 67 year old male presents for follow up.\par Taking Flomax (2 caps) \par Stream was getting better, since yesterday slower stream again. \par Daytime frequency 2 hours or so, nocturia every 3 hours or so.\par Continues to have dysuria. \par Relief with Pyridium. \par \par Status post Rezum: Transurethral water vapor thermotherapy on 12/15/22 at [facility] [place], [place]. \par Post procedure required indwelling Rios catheter for 4 weeks. \par \par Seen on 6/22/22 for history of Benign Prostatic Hyperplasia. \par Started taking Flomax 1 week ago, never took Finasteride. Was taking Prostate supplements. \par Had difficulty urinating: slow flow, hesitancy, frequency and urgency, started left over Flomax. Urinating better. \par Sits to urinate, stream splayed, daytime frequency 2-3 hours, nocturia 1 x. \par No hesitancy or urinary incontinence. Had off and on sense of incomplete emptying.\par Denied dysuria, hematuria, lower abdominal or flank pain, nausea, vomiting, fever, chills or rigors. \par Rated erections 3-4/5. Weaker since starting Flomax. Able to attain, maintain and penetrate. \par Normal libido and decreased or no ejaculation since starting Flomax. \par Has history of Elevated PSA. No Prostate biopsy. Has had MRI Prostate. Prostate volume: 66 cc. No MRI suspicious lesion. \par Per Patient recent PSA: 3.8 about a year ago. \par No family history of Prostate cancer. \par \par Has seen Mckay Tavera and Graciela in the past.

## 2023-01-29 NOTE — ASSESSMENT
[FreeTextEntry1] : Benign Prostatic Hyperplasia:\par Discussed treatment options, will continue Flomax (2 caps). \par Recommended good oral hydration. \par Continue Pyridium. \par Continue abstaining from sexual activity. \par \par Return to office in 2 weeks or sooner if any issues: will do Uroflo/PVR.

## 2023-02-10 ENCOUNTER — APPOINTMENT (OUTPATIENT)
Dept: UROLOGY | Facility: CLINIC | Age: 68
End: 2023-02-10
Payer: COMMERCIAL

## 2023-02-10 PROCEDURE — 99024 POSTOP FOLLOW-UP VISIT: CPT

## 2023-02-25 NOTE — ASSESSMENT
[FreeTextEntry1] : Benign Prostatic Hyperplasia:\par Continue Flomax. \par \par Return to office in 6 weeks or sooner if any issues: will do Uroflo/PVR.

## 2023-02-25 NOTE — HISTORY OF PRESENT ILLNESS
[FreeTextEntry1] : 67 year old male presents for follow up.\par Taking Flomax (2 caps). \par Stream better. Daytime frequency 2 to 3 hours or so and nocturia 2 to 3 x or so.\par \par Status post Rezum: Transurethral water vapor thermotherapy on 12/15/22 at [facility] [place], [place]. \par Post procedure required indwelling Rios catheter for 4 weeks. \par \par Seen on 6/22/22 for history of Benign Prostatic Hyperplasia. \par Started taking Flomax 1 week ago, never took Finasteride. Was taking Prostate supplements. \par Had difficulty urinating: slow flow, hesitancy, frequency and urgency, started left over Flomax. Urinating better. \par Sits to urinate, stream splayed, daytime frequency 2-3 hours, nocturia 1 x. \par No hesitancy or urinary incontinence. Had off and on sense of incomplete emptying.\par Denied dysuria, hematuria, lower abdominal or flank pain, nausea, vomiting, fever, chills or rigors. \par Rated erections 3-4/5. Weaker since starting Flomax. Able to attain, maintain and penetrate. \par Normal libido and decreased or no ejaculation since starting Flomax. \par Has history of Elevated PSA. No Prostate biopsy. Has had MRI Prostate. Prostate volume: 66 cc. No MRI suspicious lesion. \par Per Patient recent PSA: 3.8 about a year ago. \par No family history of Prostate cancer. \par \par Has seen Mckay Tavera and Graciela in the past.

## 2023-03-24 ENCOUNTER — APPOINTMENT (OUTPATIENT)
Dept: UROLOGY | Facility: CLINIC | Age: 68
End: 2023-03-24
Payer: COMMERCIAL

## 2023-03-24 DIAGNOSIS — R97.20 ELEVATED PROSTATE, SPECIFIC ANTIGEN [PSA]: ICD-10-CM

## 2023-03-24 PROCEDURE — 99214 OFFICE O/P EST MOD 30 MIN: CPT

## 2023-03-26 NOTE — HISTORY OF PRESENT ILLNESS
[FreeTextEntry1] : 67 year old male presents for follow up.\par Taking Flomax (2 caps). \par Stream better. Daytime frequency every 2 to 3 hours or so and nocturia 2 to 3 x or so.\par Denies hesitancy, straining, intermittency, urgency, incontinence, sense of incomplete emptying. \par Denies dysuria, hematuria, lower abdominal or flank pain, nausea, vomiting, fever, chills or rigors. \par Same erections, has antegrade ejaculation. \par \par Status post Rezum: Transurethral water vapor thermotherapy on 12/15/22 at [facility]. \par Post procedure required indwelling Rios catheter for 4 weeks. \par \par Seen on 6/22/22 for history of Benign Prostatic Hyperplasia. \par Started taking Flomax 1 week ago, never took Finasteride. Was taking Prostate supplements. \par Had difficulty urinating: slow flow, hesitancy, frequency and urgency, started left over Flomax. Urinating better. \par Sits to urinate, stream splayed, daytime frequency 2-3 hours, nocturia 1 x. \par No hesitancy or urinary incontinence. Had off and on sense of incomplete emptying.\par Denied dysuria, hematuria, lower abdominal or flank pain, nausea, vomiting, fever, chills or rigors. \par Rated erections 3-4/5. Weaker since starting Flomax. Able to attain, maintain and penetrate. \par Normal libido and decreased or no ejaculation since starting Flomax. \par Has history of Elevated PSA. No Prostate biopsy. Has had MRI Prostate. Prostate volume: 66 cc. No MRI suspicious lesion. \par Per Patient recent PSA: 3.8 about a year ago. \par No family history of Prostate cancer. \par \par Has seen Mckay Tavera and Graciela in the past.

## 2023-03-26 NOTE — ASSESSMENT
[FreeTextEntry1] : Benign Prostatic Hyperplasia:\par Status post Rezum: Transurethral water vapor thermotherapy on 12/15/22. \par Improved IPSS: 19/4(12/15/22)-->5/1(3/24/23). \par Will stop Flomax.\par \par Erectile dysfunction:\par Discussed treatment options.  \par Will get Total and Free Testosterone with PCP before follow up appointment.\par \par Elevated PSA:\par Will get Total and Free PSA with PCP before next appointment. \par \par Return to office in 3 months or sooner if any issues. will do Uroflo/PVR.

## 2023-03-26 NOTE — LETTER BODY
[Dear  ___] : Dear  [unfilled], [Courtesy Letter:] : I had the pleasure of seeing your patient, [unfilled], in my office today. [Please see my note below.] : Please see my note below. [Sincerely,] : Sincerely, [FreeTextEntry3] : Mina Yap MD\par  of Urology\par [facility]\par \par [facility]\par Offices:\par [address] [facility]\par [address] [address]\par [address] [facility], [address]\par \par TEL: [phone]\par FAX: [phone]

## 2023-04-17 NOTE — CONSULT NOTE ADULT - CONSULT REQUESTED BY NAME
Dr Abdullahi Advancement-Rotation Flap Text: The defect edges were debeveled with a #15 scalpel blade.  Given the location of the defect, shape of the defect and the proximity to free margins an advancement-rotation flap was deemed most appropriate.  Using a sterile surgical marker, an appropriate flap was drawn incorporating the defect and placing the expected incisions within the relaxed skin tension lines where possible. The area thus outlined was incised deep to adipose tissue with a #15 scalpel blade.  The skin margins were undermined to an appropriate distance in all directions utilizing iris scissors.

## 2023-04-20 ENCOUNTER — RX RENEWAL (OUTPATIENT)
Age: 68
End: 2023-04-20

## 2023-04-20 RX ORDER — CEFUROXIME AXETIL 500 MG/1
500 TABLET ORAL
Qty: 10 | Refills: 0 | Status: COMPLETED | COMMUNITY
Start: 2022-12-23 | End: 2023-04-20

## 2023-04-20 RX ORDER — PHENAZOPYRIDINE HYDROCHLORIDE 100 MG/1
100 TABLET ORAL 3 TIMES DAILY
Qty: 15 | Refills: 0 | Status: COMPLETED | COMMUNITY
Start: 2023-01-20 | End: 2023-04-20

## 2023-04-20 RX ORDER — MELOXICAM 7.5 MG/1
7.5 TABLET ORAL
Qty: 30 | Refills: 1 | Status: COMPLETED | COMMUNITY
Start: 2019-02-12 | End: 2023-04-20

## 2023-04-20 RX ORDER — CEFUROXIME AXETIL 500 MG/1
500 TABLET ORAL
Qty: 14 | Refills: 0 | Status: COMPLETED | COMMUNITY
Start: 2023-01-17 | End: 2023-04-20

## 2023-04-20 RX ORDER — TAMSULOSIN HYDROCHLORIDE 0.4 MG/1
0.4 CAPSULE ORAL
Qty: 90 | Refills: 0 | Status: COMPLETED | COMMUNITY
Start: 2022-06-22 | End: 2023-04-20

## 2023-04-25 ENCOUNTER — INPATIENT (INPATIENT)
Facility: HOSPITAL | Age: 68
LOS: 0 days | Discharge: HOME | DRG: 66 | End: 2023-04-26
Attending: HOSPITALIST | Admitting: STUDENT IN AN ORGANIZED HEALTH CARE EDUCATION/TRAINING PROGRAM
Payer: COMMERCIAL

## 2023-04-25 ENCOUNTER — TRANSCRIPTION ENCOUNTER (OUTPATIENT)
Age: 68
End: 2023-04-25

## 2023-04-25 VITALS
DIASTOLIC BLOOD PRESSURE: 106 MMHG | HEIGHT: 73 IN | WEIGHT: 220.02 LBS | OXYGEN SATURATION: 95 % | RESPIRATION RATE: 18 BRPM | HEART RATE: 81 BPM | TEMPERATURE: 98 F | SYSTOLIC BLOOD PRESSURE: 177 MMHG

## 2023-04-25 DIAGNOSIS — Z87.19 PERSONAL HISTORY OF OTHER DISEASES OF THE DIGESTIVE SYSTEM: Chronic | ICD-10-CM

## 2023-04-25 DIAGNOSIS — G45.9 TRANSIENT CEREBRAL ISCHEMIC ATTACK, UNSPECIFIED: ICD-10-CM

## 2023-04-25 DIAGNOSIS — Z98.890 OTHER SPECIFIED POSTPROCEDURAL STATES: Chronic | ICD-10-CM

## 2023-04-25 DIAGNOSIS — Z90.49 ACQUIRED ABSENCE OF OTHER SPECIFIED PARTS OF DIGESTIVE TRACT: Chronic | ICD-10-CM

## 2023-04-25 LAB
A1C WITH ESTIMATED AVERAGE GLUCOSE RESULT: 5.7 % — HIGH (ref 4–5.6)
A1C WITH ESTIMATED AVERAGE GLUCOSE RESULT: 5.7 % — HIGH (ref 4–5.6)
ADD ON TEST-SPECIMEN IN LAB: SIGNIFICANT CHANGE UP
ALBUMIN SERPL ELPH-MCNC: 3.8 G/DL — SIGNIFICANT CHANGE UP (ref 3.3–5)
ALP SERPL-CCNC: 99 U/L — SIGNIFICANT CHANGE UP (ref 40–120)
ALT FLD-CCNC: 76 U/L — SIGNIFICANT CHANGE UP (ref 12–78)
ANION GAP SERPL CALC-SCNC: 2 MMOL/L — LOW (ref 5–17)
ANION GAP SERPL CALC-SCNC: 4 MMOL/L — LOW (ref 5–17)
ANION GAP SERPL CALC-SCNC: 5 MMOL/L — SIGNIFICANT CHANGE UP (ref 5–17)
APTT BLD: 32.1 SEC — SIGNIFICANT CHANGE UP (ref 27.5–35.5)
AST SERPL-CCNC: 91 U/L — HIGH (ref 15–37)
BASOPHILS # BLD AUTO: 0.08 K/UL — SIGNIFICANT CHANGE UP (ref 0–0.2)
BASOPHILS # BLD AUTO: 0.09 K/UL — SIGNIFICANT CHANGE UP (ref 0–0.2)
BASOPHILS NFR BLD AUTO: 1 % — SIGNIFICANT CHANGE UP (ref 0–2)
BASOPHILS NFR BLD AUTO: 1 % — SIGNIFICANT CHANGE UP (ref 0–2)
BILIRUB SERPL-MCNC: 0.7 MG/DL — SIGNIFICANT CHANGE UP (ref 0.2–1.2)
BUN SERPL-MCNC: 15 MG/DL — SIGNIFICANT CHANGE UP (ref 7–23)
BUN SERPL-MCNC: 15 MG/DL — SIGNIFICANT CHANGE UP (ref 7–23)
BUN SERPL-MCNC: 16 MG/DL — SIGNIFICANT CHANGE UP (ref 7–23)
CALCIUM SERPL-MCNC: 9.1 MG/DL — SIGNIFICANT CHANGE UP (ref 8.5–10.1)
CALCIUM SERPL-MCNC: 9.2 MG/DL — SIGNIFICANT CHANGE UP (ref 8.5–10.1)
CALCIUM SERPL-MCNC: 9.3 MG/DL — SIGNIFICANT CHANGE UP (ref 8.5–10.1)
CHLORIDE SERPL-SCNC: 108 MMOL/L — SIGNIFICANT CHANGE UP (ref 96–108)
CHLORIDE SERPL-SCNC: 108 MMOL/L — SIGNIFICANT CHANGE UP (ref 96–108)
CHLORIDE SERPL-SCNC: 109 MMOL/L — HIGH (ref 96–108)
CHOLEST SERPL-MCNC: 145 MG/DL — SIGNIFICANT CHANGE UP
CHOLEST SERPL-MCNC: 159 MG/DL — SIGNIFICANT CHANGE UP
CO2 SERPL-SCNC: 23 MMOL/L — SIGNIFICANT CHANGE UP (ref 22–31)
CO2 SERPL-SCNC: 24 MMOL/L — SIGNIFICANT CHANGE UP (ref 22–31)
CO2 SERPL-SCNC: 28 MMOL/L — SIGNIFICANT CHANGE UP (ref 22–31)
CREAT SERPL-MCNC: 1 MG/DL — SIGNIFICANT CHANGE UP (ref 0.5–1.3)
CREAT SERPL-MCNC: 1.01 MG/DL — SIGNIFICANT CHANGE UP (ref 0.5–1.3)
CREAT SERPL-MCNC: 1.02 MG/DL — SIGNIFICANT CHANGE UP (ref 0.5–1.3)
EGFR: 80 ML/MIN/1.73M2 — SIGNIFICANT CHANGE UP
EGFR: 81 ML/MIN/1.73M2 — SIGNIFICANT CHANGE UP
EGFR: 81 ML/MIN/1.73M2 — SIGNIFICANT CHANGE UP
EOSINOPHIL # BLD AUTO: 0.31 K/UL — SIGNIFICANT CHANGE UP (ref 0–0.5)
EOSINOPHIL # BLD AUTO: 0.38 K/UL — SIGNIFICANT CHANGE UP (ref 0–0.5)
EOSINOPHIL NFR BLD AUTO: 3.5 % — SIGNIFICANT CHANGE UP (ref 0–6)
EOSINOPHIL NFR BLD AUTO: 4.6 % — SIGNIFICANT CHANGE UP (ref 0–6)
ESTIMATED AVERAGE GLUCOSE: 117 MG/DL — HIGH (ref 68–114)
ESTIMATED AVERAGE GLUCOSE: 117 MG/DL — HIGH (ref 68–114)
ETHANOL SERPL-MCNC: <10 MG/DL — SIGNIFICANT CHANGE UP (ref 0–10)
GLUCOSE BLDC GLUCOMTR-MCNC: 104 MG/DL — HIGH (ref 70–99)
GLUCOSE BLDC GLUCOMTR-MCNC: 133 MG/DL — HIGH (ref 70–99)
GLUCOSE BLDC GLUCOMTR-MCNC: 168 MG/DL — HIGH (ref 70–99)
GLUCOSE SERPL-MCNC: 118 MG/DL — HIGH (ref 70–99)
GLUCOSE SERPL-MCNC: 121 MG/DL — HIGH (ref 70–99)
GLUCOSE SERPL-MCNC: 122 MG/DL — HIGH (ref 70–99)
HCT VFR BLD CALC: 48 % — SIGNIFICANT CHANGE UP (ref 39–50)
HCT VFR BLD CALC: 48.4 % — SIGNIFICANT CHANGE UP (ref 39–50)
HCV AB S/CO SERPL IA: 0.13 S/CO — SIGNIFICANT CHANGE UP (ref 0–0.99)
HCV AB SERPL-IMP: SIGNIFICANT CHANGE UP
HDLC SERPL-MCNC: 39 MG/DL — LOW
HDLC SERPL-MCNC: 40 MG/DL — LOW
HGB BLD-MCNC: 17 G/DL — SIGNIFICANT CHANGE UP (ref 13–17)
HGB BLD-MCNC: 17.1 G/DL — HIGH (ref 13–17)
IMM GRANULOCYTES NFR BLD AUTO: 0.2 % — SIGNIFICANT CHANGE UP (ref 0–0.9)
IMM GRANULOCYTES NFR BLD AUTO: 0.3 % — SIGNIFICANT CHANGE UP (ref 0–0.9)
INR BLD: 1.16 RATIO — SIGNIFICANT CHANGE UP (ref 0.88–1.16)
LIPID PNL WITH DIRECT LDL SERPL: 67 MG/DL — SIGNIFICANT CHANGE UP
LIPID PNL WITH DIRECT LDL SERPL: 77 MG/DL — SIGNIFICANT CHANGE UP
LYMPHOCYTES # BLD AUTO: 2.86 K/UL — SIGNIFICANT CHANGE UP (ref 1–3.3)
LYMPHOCYTES # BLD AUTO: 2.92 K/UL — SIGNIFICANT CHANGE UP (ref 1–3.3)
LYMPHOCYTES # BLD AUTO: 32.3 % — SIGNIFICANT CHANGE UP (ref 13–44)
LYMPHOCYTES # BLD AUTO: 35.3 % — SIGNIFICANT CHANGE UP (ref 13–44)
MAGNESIUM SERPL-MCNC: 2.3 MG/DL — SIGNIFICANT CHANGE UP (ref 1.6–2.6)
MCHC RBC-ENTMCNC: 30.1 PG — SIGNIFICANT CHANGE UP (ref 27–34)
MCHC RBC-ENTMCNC: 30.4 PG — SIGNIFICANT CHANGE UP (ref 27–34)
MCHC RBC-ENTMCNC: 35.1 GM/DL — SIGNIFICANT CHANGE UP (ref 32–36)
MCHC RBC-ENTMCNC: 35.6 GM/DL — SIGNIFICANT CHANGE UP (ref 32–36)
MCV RBC AUTO: 85.3 FL — SIGNIFICANT CHANGE UP (ref 80–100)
MCV RBC AUTO: 85.8 FL — SIGNIFICANT CHANGE UP (ref 80–100)
MONOCYTES # BLD AUTO: 1.03 K/UL — HIGH (ref 0–0.9)
MONOCYTES # BLD AUTO: 1.16 K/UL — HIGH (ref 0–0.9)
MONOCYTES NFR BLD AUTO: 11.6 % — SIGNIFICANT CHANGE UP (ref 2–14)
MONOCYTES NFR BLD AUTO: 14 % — SIGNIFICANT CHANGE UP (ref 2–14)
NEUTROPHILS # BLD AUTO: 3.71 K/UL — SIGNIFICANT CHANGE UP (ref 1.8–7.4)
NEUTROPHILS # BLD AUTO: 4.54 K/UL — SIGNIFICANT CHANGE UP (ref 1.8–7.4)
NEUTROPHILS NFR BLD AUTO: 44.9 % — SIGNIFICANT CHANGE UP (ref 43–77)
NEUTROPHILS NFR BLD AUTO: 51.3 % — SIGNIFICANT CHANGE UP (ref 43–77)
NON HDL CHOLESTEROL: 107 MG/DL — SIGNIFICANT CHANGE UP
NON HDL CHOLESTEROL: 119 MG/DL — SIGNIFICANT CHANGE UP
PHOSPHATE SERPL-MCNC: 3.5 MG/DL — SIGNIFICANT CHANGE UP (ref 2.5–4.5)
PLATELET # BLD AUTO: 182 K/UL — SIGNIFICANT CHANGE UP (ref 150–400)
PLATELET # BLD AUTO: 186 K/UL — SIGNIFICANT CHANGE UP (ref 150–400)
POTASSIUM SERPL-MCNC: 3.5 MMOL/L — SIGNIFICANT CHANGE UP (ref 3.5–5.3)
POTASSIUM SERPL-MCNC: 3.6 MMOL/L — SIGNIFICANT CHANGE UP (ref 3.5–5.3)
POTASSIUM SERPL-MCNC: 4.3 MMOL/L — SIGNIFICANT CHANGE UP (ref 3.5–5.3)
POTASSIUM SERPL-SCNC: 3.5 MMOL/L — SIGNIFICANT CHANGE UP (ref 3.5–5.3)
POTASSIUM SERPL-SCNC: 3.6 MMOL/L — SIGNIFICANT CHANGE UP (ref 3.5–5.3)
POTASSIUM SERPL-SCNC: 4.3 MMOL/L — SIGNIFICANT CHANGE UP (ref 3.5–5.3)
PROT SERPL-MCNC: 7.9 GM/DL — SIGNIFICANT CHANGE UP (ref 6–8.3)
PROTHROM AB SERPL-ACNC: 13.5 SEC — HIGH (ref 10.5–13.4)
RBC # BLD: 5.63 M/UL — SIGNIFICANT CHANGE UP (ref 4.2–5.8)
RBC # BLD: 5.64 M/UL — SIGNIFICANT CHANGE UP (ref 4.2–5.8)
RBC # FLD: 13.4 % — SIGNIFICANT CHANGE UP (ref 10.3–14.5)
RBC # FLD: 13.5 % — SIGNIFICANT CHANGE UP (ref 10.3–14.5)
SODIUM SERPL-SCNC: 136 MMOL/L — SIGNIFICANT CHANGE UP (ref 135–145)
SODIUM SERPL-SCNC: 137 MMOL/L — SIGNIFICANT CHANGE UP (ref 135–145)
SODIUM SERPL-SCNC: 138 MMOL/L — SIGNIFICANT CHANGE UP (ref 135–145)
TRIGL SERPL-MCNC: 201 MG/DL — HIGH
TRIGL SERPL-MCNC: 210 MG/DL — HIGH
TROPONIN I, HIGH SENSITIVITY RESULT: 26.25 NG/L — SIGNIFICANT CHANGE UP
WBC # BLD: 8.27 K/UL — SIGNIFICANT CHANGE UP (ref 3.8–10.5)
WBC # BLD: 8.86 K/UL — SIGNIFICANT CHANGE UP (ref 3.8–10.5)
WBC # FLD AUTO: 8.27 K/UL — SIGNIFICANT CHANGE UP (ref 3.8–10.5)
WBC # FLD AUTO: 8.86 K/UL — SIGNIFICANT CHANGE UP (ref 3.8–10.5)

## 2023-04-25 PROCEDURE — 83036 HEMOGLOBIN GLYCOSYLATED A1C: CPT

## 2023-04-25 PROCEDURE — 85027 COMPLETE CBC AUTOMATED: CPT

## 2023-04-25 PROCEDURE — 83735 ASSAY OF MAGNESIUM: CPT

## 2023-04-25 PROCEDURE — 70544 MR ANGIOGRAPHY HEAD W/O DYE: CPT | Mod: 26,59

## 2023-04-25 PROCEDURE — 82962 GLUCOSE BLOOD TEST: CPT

## 2023-04-25 PROCEDURE — G1004: CPT

## 2023-04-25 PROCEDURE — 84100 ASSAY OF PHOSPHORUS: CPT

## 2023-04-25 PROCEDURE — 70551 MRI BRAIN STEM W/O DYE: CPT | Mod: MG

## 2023-04-25 PROCEDURE — 70450 CT HEAD/BRAIN W/O DYE: CPT | Mod: 26,MA

## 2023-04-25 PROCEDURE — 80076 HEPATIC FUNCTION PANEL: CPT

## 2023-04-25 PROCEDURE — 85610 PROTHROMBIN TIME: CPT

## 2023-04-25 PROCEDURE — 97116 GAIT TRAINING THERAPY: CPT | Mod: GP

## 2023-04-25 PROCEDURE — 85730 THROMBOPLASTIN TIME PARTIAL: CPT

## 2023-04-25 PROCEDURE — 97161 PT EVAL LOW COMPLEX 20 MIN: CPT | Mod: GP

## 2023-04-25 PROCEDURE — 80061 LIPID PANEL: CPT

## 2023-04-25 PROCEDURE — 99223 1ST HOSP IP/OBS HIGH 75: CPT

## 2023-04-25 PROCEDURE — G0378: CPT

## 2023-04-25 PROCEDURE — 93010 ELECTROCARDIOGRAM REPORT: CPT

## 2023-04-25 PROCEDURE — 70544 MR ANGIOGRAPHY HEAD W/O DYE: CPT

## 2023-04-25 PROCEDURE — 99285 EMERGENCY DEPT VISIT HI MDM: CPT

## 2023-04-25 PROCEDURE — 86803 HEPATITIS C AB TEST: CPT

## 2023-04-25 PROCEDURE — 36415 COLL VENOUS BLD VENIPUNCTURE: CPT

## 2023-04-25 PROCEDURE — 85025 COMPLETE CBC W/AUTO DIFF WBC: CPT

## 2023-04-25 PROCEDURE — 93306 TTE W/DOPPLER COMPLETE: CPT

## 2023-04-25 PROCEDURE — 93306 TTE W/DOPPLER COMPLETE: CPT | Mod: 26

## 2023-04-25 PROCEDURE — 70547 MR ANGIOGRAPHY NECK W/O DYE: CPT

## 2023-04-25 PROCEDURE — 12345: CPT | Mod: NC

## 2023-04-25 PROCEDURE — 70547 MR ANGIOGRAPHY NECK W/O DYE: CPT | Mod: 26

## 2023-04-25 PROCEDURE — 80048 BASIC METABOLIC PNL TOTAL CA: CPT

## 2023-04-25 PROCEDURE — 99223 1ST HOSP IP/OBS HIGH 75: CPT | Mod: 25

## 2023-04-25 PROCEDURE — 70551 MRI BRAIN STEM W/O DYE: CPT | Mod: 26

## 2023-04-25 PROCEDURE — 99497 ADVNCD CARE PLAN 30 MIN: CPT | Mod: 25

## 2023-04-25 PROCEDURE — 80307 DRUG TEST PRSMV CHEM ANLYZR: CPT

## 2023-04-25 RX ORDER — LANOLIN ALCOHOL/MO/W.PET/CERES
3 CREAM (GRAM) TOPICAL AT BEDTIME
Refills: 0 | Status: DISCONTINUED | OUTPATIENT
Start: 2023-04-25 | End: 2023-04-26

## 2023-04-25 RX ORDER — TAMSULOSIN HYDROCHLORIDE 0.4 MG/1
1 CAPSULE ORAL
Qty: 0 | Refills: 0 | DISCHARGE

## 2023-04-25 RX ORDER — ASPIRIN/CALCIUM CARB/MAGNESIUM 324 MG
0 TABLET ORAL
Qty: 0 | Refills: 0 | DISCHARGE

## 2023-04-25 RX ORDER — HYDRALAZINE HCL 50 MG
10 TABLET ORAL EVERY 6 HOURS
Refills: 0 | Status: DISCONTINUED | OUTPATIENT
Start: 2023-04-25 | End: 2023-04-26

## 2023-04-25 RX ORDER — AMLODIPINE BESYLATE 2.5 MG/1
0 TABLET ORAL
Qty: 0 | Refills: 0 | DISCHARGE

## 2023-04-25 RX ORDER — LABETALOL HCL 100 MG
10 TABLET ORAL ONCE
Refills: 0 | Status: COMPLETED | OUTPATIENT
Start: 2023-04-25 | End: 2023-04-25

## 2023-04-25 RX ORDER — METOPROLOL TARTRATE 50 MG
0 TABLET ORAL
Qty: 0 | Refills: 0 | DISCHARGE

## 2023-04-25 RX ORDER — METOPROLOL TARTRATE 50 MG
100 TABLET ORAL
Refills: 0 | Status: DISCONTINUED | OUTPATIENT
Start: 2023-04-25 | End: 2023-04-26

## 2023-04-25 RX ORDER — ASPIRIN/CALCIUM CARB/MAGNESIUM 324 MG
81 TABLET ORAL DAILY
Refills: 0 | Status: DISCONTINUED | OUTPATIENT
Start: 2023-04-25 | End: 2023-04-26

## 2023-04-25 RX ORDER — ACETAMINOPHEN 500 MG
650 TABLET ORAL EVERY 6 HOURS
Refills: 0 | Status: DISCONTINUED | OUTPATIENT
Start: 2023-04-25 | End: 2023-04-26

## 2023-04-25 RX ORDER — AMLODIPINE BESYLATE 2.5 MG/1
5 TABLET ORAL DAILY
Refills: 0 | Status: DISCONTINUED | OUTPATIENT
Start: 2023-04-25 | End: 2023-04-25

## 2023-04-25 RX ORDER — ATORVASTATIN CALCIUM 80 MG/1
80 TABLET, FILM COATED ORAL AT BEDTIME
Refills: 0 | Status: DISCONTINUED | OUTPATIENT
Start: 2023-04-25 | End: 2023-04-25

## 2023-04-25 RX ORDER — ATORVASTATIN CALCIUM 80 MG/1
10 TABLET, FILM COATED ORAL AT BEDTIME
Refills: 0 | Status: DISCONTINUED | OUTPATIENT
Start: 2023-04-25 | End: 2023-04-26

## 2023-04-25 RX ORDER — ONDANSETRON 8 MG/1
4 TABLET, FILM COATED ORAL EVERY 8 HOURS
Refills: 0 | Status: DISCONTINUED | OUTPATIENT
Start: 2023-04-25 | End: 2023-04-26

## 2023-04-25 RX ORDER — TAMSULOSIN HYDROCHLORIDE 0.4 MG/1
0.4 CAPSULE ORAL ONCE
Refills: 0 | Status: COMPLETED | OUTPATIENT
Start: 2023-04-25 | End: 2023-04-25

## 2023-04-25 RX ORDER — CLOPIDOGREL BISULFATE 75 MG/1
75 TABLET, FILM COATED ORAL DAILY
Refills: 0 | Status: DISCONTINUED | OUTPATIENT
Start: 2023-04-25 | End: 2023-04-26

## 2023-04-25 RX ORDER — ENOXAPARIN SODIUM 100 MG/ML
40 INJECTION SUBCUTANEOUS EVERY 24 HOURS
Refills: 0 | Status: DISCONTINUED | OUTPATIENT
Start: 2023-04-25 | End: 2023-04-26

## 2023-04-25 RX ORDER — ASPIRIN/CALCIUM CARB/MAGNESIUM 324 MG
325 TABLET ORAL ONCE
Refills: 0 | Status: DISCONTINUED | OUTPATIENT
Start: 2023-04-25 | End: 2023-04-25

## 2023-04-25 RX ORDER — TRIAMTERENE/HYDROCHLOROTHIAZID 75 MG-50MG
1 TABLET ORAL DAILY
Refills: 0 | Status: DISCONTINUED | OUTPATIENT
Start: 2023-04-25 | End: 2023-04-26

## 2023-04-25 RX ORDER — AMLODIPINE BESYLATE 2.5 MG/1
10 TABLET ORAL DAILY
Refills: 0 | Status: DISCONTINUED | OUTPATIENT
Start: 2023-04-26 | End: 2023-04-26

## 2023-04-25 RX ORDER — METOPROLOL TARTRATE 50 MG
100 TABLET ORAL ONCE
Refills: 0 | Status: COMPLETED | OUTPATIENT
Start: 2023-04-25 | End: 2023-04-25

## 2023-04-25 RX ORDER — METOPROLOL TARTRATE 50 MG
1 TABLET ORAL
Qty: 0 | Refills: 0 | DISCHARGE

## 2023-04-25 RX ORDER — TRIAMTERENE/HYDROCHLOROTHIAZID 75 MG-50MG
1 TABLET ORAL
Qty: 0 | Refills: 0 | DISCHARGE

## 2023-04-25 RX ADMIN — AMLODIPINE BESYLATE 5 MILLIGRAM(S): 2.5 TABLET ORAL at 10:26

## 2023-04-25 RX ADMIN — CLOPIDOGREL BISULFATE 75 MILLIGRAM(S): 75 TABLET, FILM COATED ORAL at 17:22

## 2023-04-25 RX ADMIN — Medication 10 MILLIGRAM(S): at 04:17

## 2023-04-25 RX ADMIN — Medication 1 TABLET(S): at 10:26

## 2023-04-25 RX ADMIN — ATORVASTATIN CALCIUM 10 MILLIGRAM(S): 80 TABLET, FILM COATED ORAL at 21:11

## 2023-04-25 RX ADMIN — Medication 100 MILLIGRAM(S): at 07:18

## 2023-04-25 RX ADMIN — Medication 81 MILLIGRAM(S): at 10:31

## 2023-04-25 RX ADMIN — ENOXAPARIN SODIUM 40 MILLIGRAM(S): 100 INJECTION SUBCUTANEOUS at 21:11

## 2023-04-25 NOTE — H&P ADULT - NSHPSOCIALHISTORY_GEN_ALL_CORE
Lives by himself. Has a lot of stress right now. Independent with ADLs and IADLs. Denies smoking, alcohol, drug use.

## 2023-04-25 NOTE — PHYSICAL THERAPY INITIAL EVALUATION ADULT - GENERAL OBSERVATIONS, REHAB EVAL
Pt seen for 30min PT Eval. Pt rec'd semi supine in bed in NAD. Pt indep with all mobility, no strength/RM deficits noted, only c/o of some tingling in Rt side of face.

## 2023-04-25 NOTE — H&P ADULT - HISTORY OF PRESENT ILLNESS
68 y/o M with PMH CAD, HTN, diverticulitis s/p colon resection, BPH s/p prostate surgery, cholecystectomy presented with right sided facial numbness and numbness of the right hand/fingers. Pt state sthat he was having a bad dream and woke up out of bed at 2 am. He felt a pins and needles sensation of the face and fingers on the right. Pt had fallen asleep on his left side. Denies facial droop, difficulty speaking/expressing himself, slurred speech, motor weaknss, neck injury. Denies fevers, chills, chest pain, SOB, abdominal pain, N/V, diarrhea/constipation.     ER course: /106. Labs: Hb 17.1, glucose 121, AST 91.  EKG: NSR with HR 76 bpm, PVCs,  ms, no ST segment changes, no T wave inversions (personally reviewed).   CT head: No evidence of acute intracranial hemorrhage, midline shift or CT evidence of acute territorial infarct. If the patient's symptoms persist, consider short interval follow-up head CT or brain MRI if there are no MRI contraindications.    Pt was given 10 mg IVP labetalol. He is being admitted to telemetry for further management.    66 y/o M with PMH CAD, HTN, diverticulitis s/p colon resection, BPH s/p prostate surgery, cholecystectomy presented with right sided facial numbness and numbness of the right hand/fingers. Pt states that he was having a bad dream and woke up out of bed at 2 am. He felt a pins and needles sensation of the face and fingers on the right. Pt had fallen asleep on his left side. Denies facial droop, difficulty speaking/expressing himself, slurred speech, motor weakness neck injury. Denies fevers, chills, chest pain, SOB, abdominal pain, N/V, diarrhea/constipation, burning on urination, headache, dizziness, blurry vision.     ER course: /106. Labs: Hb 17.1, glucose 121, AST 91.  EKG: NSR with HR 76 bpm, PVCs,  ms, no ST segment changes, no T wave inversions (personally reviewed).   CT head: No evidence of acute intracranial hemorrhage, midline shift or CT evidence of acute territorial infarct. If the patient's symptoms persist, consider short interval follow-up head CT or brain MRI if there are no MRI contraindications.    Pt was given 10 mg IVP labetalol. He is being admitted to telemetry for further management.

## 2023-04-25 NOTE — H&P ADULT - NSHPPHYSICALEXAM_GEN_ALL_CORE
ICU Vital Signs Last 24 Hrs  T(C): 36.6 (25 Apr 2023 05:32), Max: 36.6 (25 Apr 2023 05:32)  T(F): 97.8 (25 Apr 2023 05:32), Max: 97.8 (25 Apr 2023 05:32)  HR: 74 (25 Apr 2023 05:32) (74 - 81)  BP: 144/92 (25 Apr 2023 05:32) (144/92 - 177/106)  RR: 18 (25 Apr 2023 05:32) (18 - 18)  SpO2: 96% (25 Apr 2023 05:32) (95% - 96%)    O2 Parameters below as of 25 Apr 2023 05:32  Patient On (Oxygen Delivery Method): room air    General: Awake and alert, cooperative with exam. No acute distress.   Skin: Warm, dry, and pink.   Eyes: Pupils equal and reactive to light. Extraocular eye movements intact. No conjunctival injection, discharge, or scleral icterus.   HEENT: Atraumatic, normocephalic. Moist mucus membranes.  Cardiology: Normal S1, S2. No murmurs, rubs, or gallops. Regular rate and rhythm.   Respiratory: Lungs clear to ascultation bilaterally. Good air exchange. No wheezes, rales, or rhonchi. Normal chest expansion.   Gastrointestinal: Positive bowel sounds. Soft, non-tender, non-distended. No guarding, rigidity, or rebound tenderness. No hepatosplenomegaly.   Musculoskeletal: 5/5 motor strength in all extremities. Normal range of motion.   Extremities: No peripheral edema bilaterally. Dorsalis pedis pulses 2+ bilaterally.   Neurological: A+Ox3 (person, place, and time). Cranial nerves 2-12 intact. Normal speech. No facial droop. No focal neurological deficits. 5/5 motor strength in all extremities. Sensation intact.   Psychiatric: Normal affect. Normal mood.

## 2023-04-25 NOTE — H&P ADULT - ASSESSMENT
66 y/o M presented with right sided facial numbness     1. Right sided facial numbness possible TIA r/o CVA   - Admit to telemetry    - Not a candidate for TPA; NIHSS 0,  no FND on exam, symptoms have improved   - CT head: no acute hemorrhage  - Ordered MRI brain, MRA head/neck, lipid panel, HbA1c, ECHO   - Neuro checks Q4H  - NPO until dysphagia evaluation completed   - Blood glucose checks Q6H   - Dysphagia evaluation   - Start aspirin 81 mg daily and atorvastatin 80 mg daily  - PT evaluation  - Neurology consult - Dr. Jensen     2. Hypertensive urgency   - /106, monitor closely   - s/p 10 mg IVP labetalol in the ER   - Hydralazine PRN for SBP > 160     3. Hyperglycemia   - Glucose 121, monitor     4. Elevated AST   - AST 91, trend     5. Elevated Hb   - Hb 17.1, monitor closely   - If persistent elevation, f/u with Heme/Onc outpt     6. History of CAD, HTN, diverticulitis s/p colon resection, BPH s/p prostate surgery, cholecystectomy   - c/w home medications; verified with pt at the bedside    DVT ppx: Lovenox 40 mg subcutaneous daily   Code status: Full code (pt agrees to chest compressions and intubation if required).   Emergency contact: Esmer (daughter)     I spent a total of 75 minutes on the date of this encounter coordinating the patient's care. This includes reviewing prior documentation, results and imaging in addition to completing a full history and physical examination on the patient. Further tests, medications, and procedures have been ordered as indicated. Laboratory results and the plan of care were communicated to the patient and/or their family member. Supporting documentation was completed and added to the patient's chart.  68 y/o M presented with right sided facial numbness     1. Right sided facial numbness and numbness of the right hand/fingers possible TIA r/o CVA   - Admit to telemetry    - Not a candidate for TPA; NIHSS 0,  no FND on exam, symptoms have improved   - CT head: no acute hemorrhage  - Ordered MRI brain, MRA head/neck, lipid panel, HbA1c, ECHO   - Neuro checks Q4H  - NPO until dysphagia evaluation completed   - Blood glucose checks Q6H   - Dysphagia evaluation   - Start aspirin 81 mg daily and atorvastatin 80 mg daily  - PT evaluation  - Neurology consult - Dr. Jensen     2. Hypertensive urgency   - /106, monitor closely   - s/p 10 mg IVP labetalol in the ER   - Hydralazine PRN for SBP > 160   - Do not drop BP by more than 25% in 6 hours    3. Hyperglycemia   - Glucose 121, monitor     4. Elevated AST   - AST 91, trend     5. Elevated Hb   - Hb 17.1, monitor closely   - If persistent elevation, f/u with PCP or Heme/Onc outpt     6. History of CAD, HTN, diverticulitis s/p colon resection, BPH s/p prostate surgery, cholecystectomy   - c/w home medications; verified with pt at the bedside    DVT ppx: Lovenox 40 mg subcutaneous daily   Code status: Full code (pt agrees to chest compressions and intubation if required).   Emergency contact: Esmer (daughter)     I spent a total of 75 minutes on the date of this encounter coordinating the patient's care. This includes reviewing prior documentation, results and imaging in addition to completing a full history and physical examination on the patient. Further tests, medications, and procedures have been ordered as indicated. Laboratory results and the plan of care were communicated to the patient and/or their family member. Supporting documentation was completed and added to the patient's chart.  68 y/o M presented with right sided facial numbness     1. Right sided facial numbness and numbness of the right hand/fingers possible TIA r/o CVA   - Admit to telemetry    - Not a candidate for TPA; NIHSS 0,  no FND on exam, symptoms have improved   - CT head: no acute hemorrhage  - Ordered MRI brain, MRA head/neck, lipid panel, HbA1c, ECHO   - Neuro checks Q4H  - Passed dysphagia evaluation in ER -> DASH diet   - Blood glucose checks Q6H   - Start aspirin 81 mg daily and atorvastatin 80 mg daily  - PT evaluation  - Neurology consult - Dr. Jensen     2. Hypertensive urgency   - /106, monitor closely   - s/p 10 mg IVP labetalol in the ER   - Hydralazine PRN for SBP > 160   - Do not drop BP by more than 25% in 6 hours    3. Hyperglycemia   - Glucose 121, monitor     4. Elevated AST   - AST 91, trend     5. Elevated Hb   - Hb 17.1, monitor closely   - If persistent elevation, f/u with PCP or Heme/Onc outpt     6. History of CAD, HTN, diverticulitis s/p colon resection, BPH s/p prostate surgery, cholecystectomy   - c/w home medications; verified with pt at the bedside    DVT ppx: Lovenox 40 mg subcutaneous daily   Code status: Full code (pt agrees to chest compressions and intubation if required).   Emergency contact: Esmer (daughter)     I spent a total of 75 minutes on the date of this encounter coordinating the patient's care. This includes reviewing prior documentation, results and imaging in addition to completing a full history and physical examination on the patient. Further tests, medications, and procedures have been ordered as indicated. Laboratory results and the plan of care were communicated to the patient and/or their family member. Supporting documentation was completed and added to the patient's chart.

## 2023-04-25 NOTE — ED PROVIDER NOTE - OBJECTIVE STATEMENT
68 y/o male with h/o HTN in ED c/o right facial numbness and tingling tonight.   pt states that he went to sleep around 8-8:30 pm last night fine and awoke around 2AM with symptoms.   pt states symptoms have now improved on arrival to ED.   pt denies any slurred speech or facial droop or focal weakness.   pt denies any previous episodes.   states took his BP at home and found to be high.   pt denies any fever, HA, change in vision, neck pain, cp, sob, n/v/d/abd pain.   tolerating PO.   no sick contacts

## 2023-04-25 NOTE — ED ADULT NURSE NOTE - OBJECTIVE STATEMENT
Patient presented to the ED c/o right sided numbness since about 2 am. Patient states "on my right side I feel numbness and tingling - mostly in my face and arm". Patient denies cp, sob, headache, slurred speech, vision changes, dizziness at this time. Patient ambulatory, alert and oriented at this time. Patient reports pmhx of hypertension, on metoprolol and amlodipine. Patient does not appear to be in any distress at this time.

## 2023-04-25 NOTE — ED PROVIDER NOTE - CLINICAL SUMMARY MEDICAL DECISION MAKING FREE TEXT BOX
66 y/o male with h/o HTN in ED c/o right facial numbness and tingling tonight.   pt states that he went to sleep around 8-8:30 pm last night fine and awoke around 2AM with symptoms.   pt states symptoms have now improved on arrival to ED.   pt denies any slurred speech or facial droop or focal weakness.   pt denies any previous episodes.   states took his BP at home and found to be high.   pt denies any fever, HA, change in vision, neck pain, cp, sob, n/v/d/abd pain.   tolerating PO.   no sick contacts  PE:  no distress noted.  last known normal 8PM.   NIH score of 0 currently.   no focal deficits noted.   sensations intact.   no slurred speech or facial droop noted.   possible TIA.   will check CT, EKG< FS< labs, UA, and admit 68 y/o male with h/o HTN in ED c/o right facial numbness and tingling tonight.   pt states that he went to sleep around 8-8:30 pm last night fine and awoke around 2AM with symptoms.   pt states symptoms have now improved on arrival to ED.   pt denies any slurred speech or facial droop or focal weakness.   pt denies any previous episodes.   states took his BP at home and found to be high.   pt denies any fever, HA, change in vision, neck pain, cp, sob, n/v/d/abd pain.   tolerating PO.   no sick contacts  PE:  no distress noted.  last known normal 8PM.   NIH score of 0 currently.   no focal deficits noted.   sensations intact.   no slurred speech or facial droop noted.   possible TIA.   will check CT, EKG< FS< labs, UA, and admit    pt refused XR.  0515:  pt told of results and agree with admit for further TIA w/u.   pt not a candidate for TPA as symptoms resolved.   case d/w Rochelle and will admit. 66 y/o male with h/o HTN in ED c/o right facial numbness and tingling tonight.   pt states that he went to sleep around 8-8:30 pm last night fine and awoke around 2AM with symptoms.   pt states symptoms have now improved on arrival to ED.   pt denies any slurred speech or facial droop or focal weakness.   pt denies any previous episodes.   states took his BP at home and found to be high.   pt denies any fever, HA, change in vision, neck pain, cp, sob, n/v/d/abd pain.   tolerating PO.   no sick contacts  PE:  no distress noted.  last known normal 8PM.   NIH score of 0 currently.   no focal deficits noted.   sensations intact.   no slurred speech or facial droop noted.   possible TIA.   will check CT, EKG< FS< labs, UA, and admit    pt refused XR.  0515:  pt told of results and agree with admit for further TIA w/u.   pt not a candidate for TPA as symptoms resolved.   pt on ASA.  case d/w Rochelle and will admit.

## 2023-04-25 NOTE — H&P ADULT - NSHPREVIEWOFSYSTEMS_GEN_ALL_CORE
Constitutional: negative for fatigue, negative for fever, negative for chills, negative for decreased appetite.  Skin: negative for rashes, negative for open wounds, negative for jaundice.   Eyes: negative for blurry vision, negative for double vision.   Ears, nose, throat: negative for ear pain, negative for nasal congestion, negative for sore throat, negative for lymph node swelling.   Cardiovascular: negative for chest pain, negative for palpitations, negative for lower extremity swelling.   Respiratory: negative for shortness of breath, negative for wheezing, negative for cough.   Gastrointestinal: negative for abdominal pain, negative for nausea, negative for vomiting, negative for diarrhea, negative for constipation, negative for blood in the stool, negative for black tarry stools.   Genitourinary: negative for burning on urination, negative for urinary urgency or frequency, negative for blood in the urine.   Endocrine: negative for cold intolerance, negative for heat intolerance, negative for increased thirst.   Hematologic: negative for easy bruising or bleeding.   Musculoskeletal: negative for muscle/joint pain, negative for decreased range of motion.   Neurological: negative for dizziness, negative for headaches, negative for loss of consciousness, negative for motor weakness, positive for sensory deficits.   Psychiatric: negative for depression, negative for anxiety.

## 2023-04-25 NOTE — DISCHARGE NOTE NURSING/CASE MANAGEMENT/SOCIAL WORK - PATIENT PORTAL LINK FT
You can access the FollowMyHealth Patient Portal offered by [facility] by registering at the following website: [email or web address] By joining [email or web address] [email or web address] portal, you will also be able to view your health information using other applications (apps) compatible with our system.

## 2023-04-25 NOTE — DISCHARGE NOTE NURSING/CASE MANAGEMENT/SOCIAL WORK - NSDCPEFALRISK_GEN_ALL_CORE
For information on Fall & Injury Prevention, visit: [email or web address] OR  [email or web address] OR  https://www.cdc.gov/steadi/patient.html

## 2023-04-25 NOTE — PATIENT PROFILE ADULT - FUNCTIONAL ASSESSMENT - DAILY ACTIVITY 6.
4 = No assist / stand by assistance Protopic Counseling: Patient may experience a mild burning sensation during topical application. Protopic is not approved in children less than 2 years of age. There have been case reports of hematologic and skin malignancies in patients using topical calcineurin inhibitors although causality is questionable.

## 2023-04-25 NOTE — H&P ADULT - NSICDXPASTSURGICALHX_GEN_ALL_CORE_FT
PAST SURGICAL HISTORY:  History of cholecystectomy     History of cholecystectomy     History of diverticulitis resection 2012    History of inguinal hernia repair     History of prostate surgery     S/P partial colectomy for diverticulitis

## 2023-04-25 NOTE — PHYSICAL THERAPY INITIAL EVALUATION ADULT - PERTINENT HX OF CURRENT PROBLEM, REHAB EVAL
66 y/o M with PMH CAD, HTN, diverticulitis s/p colon resection, BPH s/p prostate surgery presented to ED at 3.00 AM on 4/25 with c/o right sided facial numbness and numbness right hand/fingers. Upon arrival in ED, symptoms improved, NIHSS 0, pt had taken his BP at home and found to be high. In ED, /106.    CT HEAD: No evidence of acute intracranial hemorrhage, midline shift or CT evidence of acute territorial infarct. 68 y/o M with PMH CAD, HTN, diverticulitis s/p colon resection, BPH s/p prostate surgery presented to ED at 3.00 AM on 4/25 with c/o right sided facial numbness and numbness right hand/fingers. Upon arrival in ED, symptoms improved, NIHSS 0, pt had taken his BP at home and found to be high. In ED, /106.    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 No evidence of associated hemorrhage. Bihemispheric altered white matter signal; a nonspecific finding which statistically reflects chronic microvascular ischemic change. Additional findings described in detail above.; MRA NECK and BRAIN: Bilateral vertebral arteries patent and codominant. No evidence of hemodynamically significant stenosis bilateral extracranial carotid arteries. No definitive evidence of hemodynamically significant stenosis, aneurysm or vascular malformation intracranial circulation, as visualized.; CT HEAD: No evidence of acute intracranial hemorrhage, midline shift or CT evidence of acute territorial infarct.

## 2023-04-25 NOTE — ED ADULT TRIAGE NOTE - CHIEF COMPLAINT QUOTE
Ambulatory to ER with c/o R facial numbness starting at 2am; patient denies headache/ slurred speech/ dizziness. Patient evaluated by Dr. Crowe in triage; code stroke not activated. Ambulatory to ER with c/o R facial numbness starting at 2am; patient denies headache/ slurred speech/ dizziness. Patient evaluated by Dr. Crowe in triage; code stroke not activated. Blood Glucose 112 in triage.

## 2023-04-25 NOTE — ED ADULT NURSE NOTE - CHIEF COMPLAINT QUOTE
Ambulatory to ER with c/o R facial numbness starting at 2am; patient denies headache/ slurred speech/ dizziness. Patient evaluated by Dr. Corwe in triage; code stroke not activated. Blood Glucose 112 in triage.

## 2023-04-25 NOTE — CONSULT NOTE ADULT - ASSESSMENT
66 y/o M with PMH CAD, HTN, diverticulitis s/p colon resection, BPH s/p prostate surgery presented to ED at 3.00 AM on 4/25 with c/o right sided facial numbness and numbness right hand/fingers. Pt reports he slept around 8-8:30 pm, awoke around 2 AM,felt a pins and needles sensation of the face and fingers/ hand on the right side, he denies facial droop, difficulty speaking/expressing himself, slurred speech, focal motor weakness or diminished dexterity of right hand movements. No neck pain radiating to the arm, no headache, dizziness or blurry vision.     Upon arrival in ED, symptoms improved, NIHSS 0, pt had taken his BP at home and found to be high. In ED, /106.  In ED CT head: No evidence of acute intracranial hemorrhage or acute territorial infarct. Pt was given 10 mg IVP labetalol, admitted to tele.     68 y/o M with PMH CAD, HTN, diverticulitis s/p colon resection, BPH s/p prostate surgery presented to ED at 3.00 AM on 4/25/23 with c/o right sided facial numbness and numbness/inco-ordination of right hand. Pt reports he slept around 8-8:30 pm, awoke around 2 AM, felt a pins and needles sensation of the right side face and right hand, he was sleeping on his left side hence felt it unusual to have right facial numbess, he denied facial droop, difficulty speaking/expressing or slurred speech, motor weakness but had difficulty signing his name in ED. In ED, symptoms improved, NIHSS 0, /106. CT head: No acute hemorrhage or territorial infarct. Given 10 mg IVP labetalol    # TIA; with waning symptoms - current NIHSS -0 but subjective sx have nit resolved fully    # Multiple risk factor; HTN, CAD, prior smoker    - Add Plavix 75 mg daily x 3 weeks, then D/C, Continue ASA 81 mg daily  - F/U lipid panel, Statin high dose; aim to keep LDL < 70  - BP optimization  - Echo, holter, may need LT monitoring  - MRI brain   - PT/OT/ST  -DVT prophylaxis    Above D/W Dr. Melgoza and pt in detail

## 2023-04-25 NOTE — H&P ADULT - REASON FOR ADMISSION
right sided facial numbness and numbness of the right hand/fingers Right sided facial numbness and numbness of the right hand/fingers

## 2023-04-25 NOTE — CONSULT NOTE ADULT - SUBJECTIVE AND OBJECTIVE BOX
CC: 67 y old  Male who presents with a chief complaint of Right sided facial numbness and numbness of the right hand/fingers    HPI:  68 y/o M with PMH CAD, HTN, diverticulitis s/p colon resection, BPH s/p prostate surgery presented to ED at 3.00 AM on 4/25 with c/o right sided facial numbness and numbness right hand/fingers. Pt reports he slept around 8-8:30 pm, awoke around 2 AM,felt a pins and needles sensation of the face and fingers/ hand on the right side, he denies facial droop, difficulty speaking/expressing himself, slurred speech, focal motor weakness or diminished dexterity of right hand movements. No neck pain radiating to the arm, no headache, dizziness or blurry vision.     Upon arrival in ED, symptoms improved, NIHSS 0, pt had taken his BP at home and found to be high. In ED, /106.  In ED CT head: No evidence of acute intracranial hemorrhage or acute territorial infarct. Pt was given 10 mg IVP labetalol, admitted to tele.    On exam;        PAST MEDICAL & SURGICAL HISTORY:  Diverticulitis  Hypertension  BPH (benign prostatic hyperplasia)  Other obstructive and reflux uropathy  CAD (coronary artery disease)  S/P partial colectomy  History of inguinal hernia repair  History of cholecystectomy  History of diverticulitis  History of prostate surgery      FAMILY HISTORY:  Family history of cardiac disorder in father (Father)  Family history of diabetes mellitus in father (Father)        Social Hx:  Nonsmoker, no drug or alcohol use        MEDICATIONS  (STANDING):  amLODIPine   Tablet 5 milliGRAM(s) Oral daily  aspirin enteric coated 81 milliGRAM(s) Oral daily  atorvastatin 80 milliGRAM(s) Oral at bedtime  enoxaparin Injectable 40 milliGRAM(s) SubCutaneous every 24 hours  metoprolol tartrate 100 milliGRAM(s) Oral two times a day  triamterene 37.5 mG/hydrochlorothiazide 25 mG Tablet 1 Tablet(s) Oral daily       Allergies  No Known Allergies      ROS: Pertinent positives in HPI, all other ROS were reviewed and are negative.        Vital Signs Last 24 Hrs  T(C): 36.6 (25 Apr 2023 08:09), Max: 36.6 (25 Apr 2023 05:32)  T(F): 97.8 (25 Apr 2023 08:09), Max: 97.8 (25 Apr 2023 05:32)  HR: 74 (25 Apr 2023 08:09) (74 - 81)  BP: 146/103 (25 Apr 2023 08:09) (144/92 - 177/106)  BP(mean): --  RR: 18 (25 Apr 2023 08:09) (18 - 18)  SpO2: 96% (25 Apr 2023 08:09) (95% - 98%)    Parameters below as of 25 Apr 2023 08:09  Patient On (Oxygen Delivery Method): room air        Gen exam:  Normocephalic, in no distress, awake and alert.  HEENT: PERRLA, EOMI,   Neck: Supple.  Respiratory: Breath sounds are clear bilaterally  Cardiovascular: S1 and S2, regular  Extremities:  no edema  Vascular: Caritid Bruit - no  Musculoskeletal: no joint swelling/tenderness, no abnormal movements  Skin: No rashes      Neurological exam:  HF: A x O x 3. Appropriately interactive, normal affect. Speech fluent, No Aphasia. Naming /repetition intact   CN: RICHELLE, EOMI, VFF, facial sensation normal, no NLFD, tongue midline, Palate moves equally, SCM equal bilaterally  Motor: No pronator drift, Strength 5/5 in all 4 ext, normal bulk and tone, no tremor, rigidity.    Sens: Intact to light touch  Reflexes: Symmetric and normal, downgoing toes b/l  Coord:  No FNFA, dysmetria, JAME intact   Gait/Balance: Not tested    NIHSS: 0          Labs:   04-25    137  |  108  |  15  ----------------------------<  118<H>  3.6   |  24  |  1.01    Ca    9.3      25 Apr 2023 08:46  Phos  3.5     04-25  Mg     2.3     04-25    TPro  7.9  /  Alb  3.8  /  TBili  0.7  /  DBili  x   /  AST  91<H>  /  ALT  76  /  AlkPhos  99  04-25                          17.0   8.86  )-----------( 186      ( 25 Apr 2023 08:46 )             48.4       Radiology:  < from: CT Head No Cont (04.25.23 @ 04:11) >  There is no evidence of mass or acute intracranial hemorrhage. Ventricles   and sulci are prominent with age-related parenchymal volume loss. No   midline shift or other significant mass effect is noted. There is no CT   evidence of acute territorial infarct. There are periventricular white   matter hypodensities that are nonspecific in nature but may reflect   chronic ischemic microvascular disease.    The visualized paranasal sinuses and tympanomastoid spaces are clear.   Orbits and orbital contents are unremarkable.    There is no depressed calvarial fracture.      IMPRESSION:  No evidence of acute intracranial hemorrhage, midline shift or CT   evidence of acute territorial infarct.     CC: 67 y old  Male who presents with a chief complaint of Right sided facial numbness and numbness of the right hand/fingers    HPI:  68 y/o M with PMH CAD, HTN, diverticulitis s/p colon resection, BPH s/p prostate surgery presented to ED at 3.00 AM on 4/25/23 with c/o right sided facial numbness and numbness of right hand/fingers. Pt reports he slept around 8-8:30 pm, awoke around 2 AM, felt a pins and needles sensation of the right side face and right hand, he was sleeping on his left side hence felt it unusual to have right facial numbess, he denied facial droop, difficulty speaking/expressing himself, slurred speech, focal motor weakness but he did have difficulty signing his name in ED. Denied neck pain radiating to the arm, no headache, dizziness or blurry vision.     Upon arrival in ED, symptoms improved, NIHSS 0, pt had taken his BP at home and found to be high. In ED, /106.  CT head: No evidence of acute intracranial hemorrhage or acute territorial infarct. Pt was given 10 mg IVP labetalol, admitted to tele.    On exam; pt reports feeling if Novocaine wearing off on right side of face, he has been compliant, F/U with Dr. Seo - cardio      PAST MEDICAL & SURGICAL HISTORY:  Diverticulitis  Hypertension  BPH (benign prostatic hyperplasia)  Other obstructive and reflux uropathy  CAD (coronary artery disease)  S/P partial colectomy  History of inguinal hernia repair  History of cholecystectomy  History of diverticulitis  History of prostate surgery      FAMILY HISTORY:  Family history of cardiac disorder in father (Father)  Family history of diabetes mellitus in father (Father)        Social Hx: Former smoker, no drug or alcohol use        MEDICATIONS  (STANDING):  amLODIPine   Tablet 5 milliGRAM(s) Oral daily  aspirin enteric coated 81 milliGRAM(s) Oral daily  atorvastatin 80 milliGRAM(s) Oral at bedtime  enoxaparin Injectable 40 milliGRAM(s) SubCutaneous every 24 hours  metoprolol tartrate 100 milliGRAM(s) Oral two times a day  triamterene 37.5 mG/hydrochlorothiazide 25 mG Tablet 1 Tablet(s) Oral daily       Allergies  No Known Allergies      ROS: Pertinent positives in HPI, all other ROS were reviewed and are negative.        Vital Signs Last 24 Hrs  T(C): 36.6 (25 Apr 2023 08:09), Max: 36.6 (25 Apr 2023 05:32)  T(F): 97.8 (25 Apr 2023 08:09), Max: 97.8 (25 Apr 2023 05:32)  HR: 74 (25 Apr 2023 08:09) (74 - 81)  BP: 146/103 (25 Apr 2023 08:09) (144/92 - 177/106)  BP(mean): --  RR: 18 (25 Apr 2023 08:09) (18 - 18)  SpO2: 96% (25 Apr 2023 08:09) (95% - 98%)    Parameters below as of 25 Apr 2023 08:09  Patient On (Oxygen Delivery Method): room air        Gen exam:  Normocephalic, in no distress, awake and alert.  HEENT: PERRLA, EOMI,   Neck: Supple.  Respiratory: Breath sounds are clear bilaterally  Cardiovascular: S1 and S2, regular  Extremities:  no edema  Vascular: Caritid Bruit - no  Musculoskeletal: no joint swelling/tenderness, no abnormal movements  Skin: No rashes      Neurological exam:  HF: A x O x 3. Appropriately interactive, normal affect. Speech fluent, No Aphasia. Naming /repetition intact   CN: RICHELLE, EOMI, VFF, facial sensation normal, no NLFD, tongue midline, Palate moves equally, SCM equal bilaterally  Motor: No pronator drift, Strength 5/5 in all 4 ext, normal bulk and tone, no tremor, rigidity.    Sens: Intact to light touch  Reflexes: Symmetric and normal, downgoing toes b/l  Coord:  No FNFA, dysmetria, JAME intact   Gait/Balance: Not tested    NIHSS: 0          Labs:   04-25    137  |  108  |  15  ----------------------------<  118<H>  3.6   |  24  |  1.01    Ca    9.3      25 Apr 2023 08:46  Phos  3.5     04-25  Mg     2.3     04-25    TPro  7.9  /  Alb  3.8  /  TBili  0.7  /  DBili  x   /  AST  91<H>  /  ALT  76  /  AlkPhos  99  04-25                          17.0   8.86  )-----------( 186      ( 25 Apr 2023 08:46 )             48.4       Radiology:  < from: CT Head No Cont (04.25.23 @ 04:11) >  There is no evidence of mass or acute intracranial hemorrhage. Ventricles   and sulci are prominent with age-related parenchymal volume loss. No   midline shift or other significant mass effect is noted. There is no CT   evidence of acute territorial infarct. There are periventricular white   matter hypodensities that are nonspecific in nature but may reflect   chronic ischemic microvascular disease.    The visualized paranasal sinuses and tympanomastoid spaces are clear.   Orbits and orbital contents are unremarkable.    There is no depressed calvarial fracture.      IMPRESSION:  No evidence of acute intracranial hemorrhage, midline shift or CT   evidence of acute territorial infarct.

## 2023-04-25 NOTE — PATIENT PROFILE ADULT - FALL HARM RISK - HARM RISK INTERVENTIONS

## 2023-04-25 NOTE — ED ADULT NURSE NOTE - NSIMPLEMENTINTERV_GEN_ALL_ED
Implemented All Universal Safety Interventions:  [place] to call system. Call bell, personal items and telephone within reach. Instruct patient to call for assistance. Room bathroom lighting operational. Non-slip footwear when patient is off stretcher. Physically safe environment: no spills, clutter or unnecessary equipment. Stretcher in lowest position, wheels locked, appropriate side rails in place.

## 2023-04-25 NOTE — H&P ADULT - NSICDXPASTMEDICALHX_GEN_ALL_CORE_FT
PAST MEDICAL HISTORY:  BPH (benign prostatic hyperplasia)     CAD (coronary artery disease)     Diverticulitis     Diverticulosis     HTN (hypertension)     Hypertension     Other obstructive and reflux uropathy

## 2023-04-26 ENCOUNTER — TRANSCRIPTION ENCOUNTER (OUTPATIENT)
Age: 68
End: 2023-04-26

## 2023-04-26 VITALS
SYSTOLIC BLOOD PRESSURE: 131 MMHG | RESPIRATION RATE: 18 BRPM | DIASTOLIC BLOOD PRESSURE: 92 MMHG | OXYGEN SATURATION: 97 % | HEART RATE: 98 BPM | TEMPERATURE: 98 F

## 2023-04-26 LAB
ALBUMIN SERPL ELPH-MCNC: 4 G/DL — SIGNIFICANT CHANGE UP (ref 3.3–5)
ALP SERPL-CCNC: 80 U/L — SIGNIFICANT CHANGE UP (ref 40–120)
ALT FLD-CCNC: 71 U/L — SIGNIFICANT CHANGE UP (ref 12–78)
ANION GAP SERPL CALC-SCNC: 5 MMOL/L — SIGNIFICANT CHANGE UP (ref 5–17)
AST SERPL-CCNC: 49 U/L — HIGH (ref 15–37)
BILIRUB DIRECT SERPL-MCNC: 0.3 MG/DL — SIGNIFICANT CHANGE UP (ref 0–0.3)
BILIRUB INDIRECT FLD-MCNC: 0.8 MG/DL — SIGNIFICANT CHANGE UP (ref 0.2–1)
BILIRUB SERPL-MCNC: 1.1 MG/DL — SIGNIFICANT CHANGE UP (ref 0.2–1.2)
BUN SERPL-MCNC: 16 MG/DL — SIGNIFICANT CHANGE UP (ref 7–23)
CALCIUM SERPL-MCNC: 9.1 MG/DL — SIGNIFICANT CHANGE UP (ref 8.5–10.1)
CHLORIDE SERPL-SCNC: 108 MMOL/L — SIGNIFICANT CHANGE UP (ref 96–108)
CO2 SERPL-SCNC: 26 MMOL/L — SIGNIFICANT CHANGE UP (ref 22–31)
CREAT SERPL-MCNC: 1.11 MG/DL — SIGNIFICANT CHANGE UP (ref 0.5–1.3)
EGFR: 72 ML/MIN/1.73M2 — SIGNIFICANT CHANGE UP
GLUCOSE BLDC GLUCOMTR-MCNC: 127 MG/DL — HIGH (ref 70–99)
GLUCOSE SERPL-MCNC: 125 MG/DL — HIGH (ref 70–99)
HCT VFR BLD CALC: 48.9 % — SIGNIFICANT CHANGE UP (ref 39–50)
HGB BLD-MCNC: 17.3 G/DL — HIGH (ref 13–17)
MCHC RBC-ENTMCNC: 30.6 PG — SIGNIFICANT CHANGE UP (ref 27–34)
MCHC RBC-ENTMCNC: 35.4 GM/DL — SIGNIFICANT CHANGE UP (ref 32–36)
MCV RBC AUTO: 86.5 FL — SIGNIFICANT CHANGE UP (ref 80–100)
PLATELET # BLD AUTO: 170 K/UL — SIGNIFICANT CHANGE UP (ref 150–400)
POTASSIUM SERPL-MCNC: 3.6 MMOL/L — SIGNIFICANT CHANGE UP (ref 3.5–5.3)
POTASSIUM SERPL-SCNC: 3.6 MMOL/L — SIGNIFICANT CHANGE UP (ref 3.5–5.3)
PROT SERPL-MCNC: 7.9 GM/DL — SIGNIFICANT CHANGE UP (ref 6–8.3)
RBC # BLD: 5.65 M/UL — SIGNIFICANT CHANGE UP (ref 4.2–5.8)
RBC # FLD: 13.9 % — SIGNIFICANT CHANGE UP (ref 10.3–14.5)
SODIUM SERPL-SCNC: 139 MMOL/L — SIGNIFICANT CHANGE UP (ref 135–145)
WBC # BLD: 7.85 K/UL — SIGNIFICANT CHANGE UP (ref 3.8–10.5)
WBC # FLD AUTO: 7.85 K/UL — SIGNIFICANT CHANGE UP (ref 3.8–10.5)

## 2023-04-26 PROCEDURE — 99239 HOSP IP/OBS DSCHRG MGMT >30: CPT

## 2023-04-26 PROCEDURE — 99232 SBSQ HOSP IP/OBS MODERATE 35: CPT

## 2023-04-26 RX ORDER — ATORVASTATIN CALCIUM 80 MG/1
1 TABLET, FILM COATED ORAL
Qty: 30 | Refills: 0
Start: 2023-04-26 | End: 2023-05-25

## 2023-04-26 RX ORDER — AMLODIPINE BESYLATE 2.5 MG/1
1 TABLET ORAL
Qty: 0 | Refills: 0 | DISCHARGE

## 2023-04-26 RX ORDER — CLOPIDOGREL BISULFATE 75 MG/1
1 TABLET, FILM COATED ORAL
Qty: 20 | Refills: 0
Start: 2023-04-26 | End: 2023-05-15

## 2023-04-26 RX ORDER — AMLODIPINE BESYLATE 2.5 MG/1
1 TABLET ORAL
Qty: 30 | Refills: 0
Start: 2023-04-26 | End: 2023-05-25

## 2023-04-26 RX ADMIN — Medication 81 MILLIGRAM(S): at 10:53

## 2023-04-26 RX ADMIN — Medication 100 MILLIGRAM(S): at 10:53

## 2023-04-26 RX ADMIN — Medication 1 TABLET(S): at 10:53

## 2023-04-26 RX ADMIN — CLOPIDOGREL BISULFATE 75 MILLIGRAM(S): 75 TABLET, FILM COATED ORAL at 10:53

## 2023-04-26 RX ADMIN — AMLODIPINE BESYLATE 10 MILLIGRAM(S): 2.5 TABLET ORAL at 10:53

## 2023-04-26 NOTE — DISCHARGE NOTE PROVIDER - CARE PROVIDER_API CALL
Jamel Seo)  Cardiovascular Disease; Critical Care Medicine; Internal Medicine; Interventional Cardiology  [address] [place] [address]  Phone: [phone]  Fax: [phone]  Established Patient  Follow Up Time: 1 week    Theodore Jensen)  Neurology  [address] [facility], [facility] [address]  [place] [address]  Phone: [phone]  Fax: [phone]  Established Patient  Follow Up Time: 2 months

## 2023-04-26 NOTE — DISCHARGE NOTE PROVIDER - REASON FOR ADMISSION
Acute CVA  Right sided facial numbness and numbness of the right hand/fingers Right sided facial numbness and numbness of the right hand/fingers

## 2023-04-26 NOTE — CONSULT NOTE ADULT - SUBJECTIVE AND OBJECTIVE BOX
CHIEF COMPLAINT: Patient is a 67y old  Male who presents with a chief complaint of Right sided facial numbness and numbness of the right hand/fingers (2023 16:27)    FROM H&P: 66 y/o M with PMH CAD, HTN, diverticulitis s/p colon resection, BPH s/p prostate surgery, cholecystectomy presented with right sided facial numbness and numbness of the right hand/fingers. Pt states that he was having a bad dream and woke up out of bed at 2 am. He felt a pins and needles sensation of the face and fingers on the right. Pt had fallen asleep on his left side. Denies facial droop, difficulty speaking/expressing himself, slurred speech, motor weakness neck injury. Denies fevers, chills, chest pain, SOB, abdominal pain, N/V, diarrhea/constipation, burning on urination, headache, dizziness, blurry vision. ER course: /106. Labs: Hb 17.1, glucose 121, AST 91.  EKG: NSR with HR 76 bpm, PVCs,  ms, no ST segment changes, no T wave inversions (personally reviewed). CT head: No evidence of acute intracranial hemorrhage, midline shift or CT evidence of acute territorial infarct. If the patient's symptoms persist, consider short interval follow-up head CT or brain MRI if there are no MRI contraindications. Pt was given 10 mg IVP labetalol. He is being admitted to telemetry for further management.  (2023 05:37)    Cardiology consulted for CVA, HFrEF      PREVIOUS DIAGNOSTIC TESTING:      ECHO: FINDINGS:    STRESS: FINDINGS:    CATHETERIZATION: FINDINGS:    FAMILY HISTORY:  Family history of cardiac disorder in father, Age at diagnosis: 51-60  Family history of diabetes mellitus in father, Age at diagnosis: Age Unknown.    SOCIAL HISTORY:  Lives by himself. Has a lot of stress right now. Independent with ADLs and IADLs. Denies smoking, alcohol, drug use.    MEDICATIONS:  amLODIPine   Tablet 10 milliGRAM(s) Oral daily  aspirin enteric coated 81 milliGRAM(s) Oral daily  atorvastatin 10 milliGRAM(s) Oral at bedtime  clopidogrel Tablet 75 milliGRAM(s) Oral daily  enoxaparin Injectable 40 milliGRAM(s) SubCutaneous every 24 hours  metoprolol tartrate 100 milliGRAM(s) Oral two times a day  triamterene 37.5 mG/hydrochlorothiazide 25 mG Tablet 1 Tablet(s) Oral daily    MEDICATIONS  (PRN):  acetaminophen     Tablet .. 650 milliGRAM(s) Oral every 6 hours PRN Temp greater or equal to 38C (100.4F), Mild Pain (1 - 3)  aluminum hydroxide/magnesium hydroxide/simethicone Suspension 30 milliLiter(s) Oral every 4 hours PRN Dyspepsia  hydrALAZINE Injectable 10 milliGRAM(s) IV Push every 6 hours PRN SBP > 160  melatonin 3 milliGRAM(s) Oral at bedtime PRN Insomnia  ondansetron Injectable 4 milliGRAM(s) IV Push every 8 hours PRN Nausea and/or Vomiting    HOME MEDICATIONS:  amLODIPine 5 mg oral tablet: 1 tab(s) orally once a day (2023 06:05)  metoprolol tartrate 100 mg oral tablet: 1 tab(s) orally 2 times a day (2023 06:05)  triamterene-hydrochlorothiazide 37.5 mg-25 mg oral capsule: 1 cap(s) orally once a day (2023 06:05)    PHYSICAL EXAM:  T(C): 36.5 (2023 08:47), Max: 36.6 (2023 15:33)  T(F): 97.7 (2023 08:47), Max: 97.8 (2023 15:33)  HR: 98 (2023 08:47) (76 - 98)  BP: 131/92 (2023 08:47) (111/58 - 131/92)  RR: 18 (2023 08:47) (18 - 18)  SpO2: 97% (2023 08:47) (94% - 97%)    Parameters below as of 2023 08:47  Patient On (Oxygen Delivery Method): room air    Constitutional: NAD, awake and alert  HEENT: PERR, EOMI, Normal Hearing, MMM  Neck: Soft and supple, No LAD, No JVD  Respiratory: Breath sounds are clear bilaterally, No wheezing, rales or rhonchi  Cardiovascular: S1 and S2, regular rate and rhythm, no Murmurs, gallops or rubs  Gastrointestinal: Bowel Sounds present, soft, nontender, nondistended, no guarding, no rebound  Extremities: No peripheral edema  Vascular: 2+ peripheral pulses  Neurological: A/O x 3, no focal deficits  Musculoskeletal: 5/5 strength b/l upper and lower extremities  Skin: No rashes    =======================================    INTERPRETATION OF TELEMETRY:    EC23: Diagnosis Line Sinus rhythm with occasional Premature ventricular complexes. Prolonged QT. Abnormal ECG    ========================================    LABS:                        17.3   7.85  )-----------( 170      ( 2023 07:41 )             48.9         137  |  108  |  15  ----------------------------<  118<H>  3.6   |  24  |  1.01    Ca    9.3      2023 08:46  Phos  3.5       Mg     2.3         TPro  7.9  /  Alb  3.8  /  TBili  0.7  /  DBili  x   /  AST  91<H>  /  ALT  76  /  AlkPhos  99  -    PT/INR - ( 2023 05:23 )   PT: 13.5 sec;   INR: 1.16 ratio       PTT - ( 2023 05:23 )  PTT:32.1 sec    A1C 5.7    Trop 26.25    CARDIAC TESTIN/25/23: TTE: Left ventricle systolic function appears mildly impaired (global) with estimated left ventricle ejection fraction 45-50 %. Mild diastolic dysfunction (stage I). Mild concentric left ventricular hypertrophy. The left atrium is mildly dilated. Mild to moderate mitral regurgitation. Aortic valve sclerosis.    RADIOLOGY & ADDITIONAL STUDIES:    23: MR Angio Neck No Cont: MRI BRAIN 1. Approximate 1.1 cm acute infarct left thalamocapsular region. No evidence of associated hemorrhage. 2. Bihemispheric altered white matter signal; a nonspecific finding which statistically reflects chronic microvascular ischemic change. 3. Additional findings described in detail above. MRA NECK and BRAIN 1. Bilateral vertebral arteries patent and codominant. 2. No evidence of hemodynamically significant stenosis bilateral extracranial carotid arteries. 3. No definitive evidence of hemodynamically significant stenosis, aneurysm or vascular malformation intracranial circulation, as visualized.     23. CT Head No Cont: No evidence of acute intracranial hemorrhage, midline shift or CT evidence of acute territorial infarct.     CHIEF COMPLAINT: Patient is a 67y old  Male who presents with a chief complaint of Right sided facial numbness and numbness of the right hand/fingers (2023 16:27)    FROM H&P: 68 y/o M with PMH CAD, HTN, diverticulitis s/p colon resection, BPH s/p prostate surgery, cholecystectomy presented with right sided facial numbness and numbness of the right hand/fingers. Pt states that he was having a bad dream and woke up out of bed at 2 am. He felt a pins and needles sensation of the face and fingers on the right. Pt had fallen asleep on his left side. Denies facial droop, difficulty speaking/expressing himself, slurred speech, motor weakness neck injury. Denies fevers, chills, chest pain, SOB, abdominal pain, N/V, diarrhea/constipation, burning on urination, headache, dizziness, blurry vision. ER course: /106. Labs: Hb 17.1, glucose 121, AST 91.  EKG: NSR with HR 76 bpm, PVCs,  ms, no ST segment changes, no T wave inversions (personally reviewed). CT head: No evidence of acute intracranial hemorrhage, midline shift or CT evidence of acute territorial infarct. If the patient's symptoms persist, consider short interval follow-up head CT or brain MRI if there are no MRI contraindications. Pt was given 10 mg IVP labetalol. He is being admitted to telemetry for further management.  (2023 05:37)    Cardiology consulted for CVA, HFrEF  patient seen and examined this AM, feeling well  Echo reviewed, EF looks normal  MCOT monitor on d/c  No sob, cp, hand numbness resumes, RT facial numbness still present.    FAMILY HISTORY:  Family history of cardiac disorder in father, Age at diagnosis: 51-60  Family history of diabetes mellitus in father, Age at diagnosis: Age Unknown.    SOCIAL HISTORY:  Lives by himself. Has a lot of stress right now. Independent with ADLs and IADLs. Denies smoking, alcohol, drug use.    MEDICATIONS:  amLODIPine   Tablet 10 milliGRAM(s) Oral daily  aspirin enteric coated 81 milliGRAM(s) Oral daily  atorvastatin 10 milliGRAM(s) Oral at bedtime  clopidogrel Tablet 75 milliGRAM(s) Oral daily  enoxaparin Injectable 40 milliGRAM(s) SubCutaneous every 24 hours  metoprolol tartrate 100 milliGRAM(s) Oral two times a day  triamterene 37.5 mG/hydrochlorothiazide 25 mG Tablet 1 Tablet(s) Oral daily    MEDICATIONS  (PRN):  acetaminophen     Tablet .. 650 milliGRAM(s) Oral every 6 hours PRN Temp greater or equal to 38C (100.4F), Mild Pain (1 - 3)  aluminum hydroxide/magnesium hydroxide/simethicone Suspension 30 milliLiter(s) Oral every 4 hours PRN Dyspepsia  hydrALAZINE Injectable 10 milliGRAM(s) IV Push every 6 hours PRN SBP > 160  melatonin 3 milliGRAM(s) Oral at bedtime PRN Insomnia  ondansetron Injectable 4 milliGRAM(s) IV Push every 8 hours PRN Nausea and/or Vomiting    HOME MEDICATIONS:  amLODIPine 5 mg oral tablet: 1 tab(s) orally once a day (2023 06:05)  metoprolol tartrate 100 mg oral tablet: 1 tab(s) orally 2 times a day (2023 06:05)  triamterene-hydrochlorothiazide 37.5 mg-25 mg oral capsule: 1 cap(s) orally once a day (2023 06:05)    PHYSICAL EXAM:  T(C): 36.5 (2023 08:47), Max: 36.6 (2023 15:33)  T(F): 97.7 (2023 08:47), Max: 97.8 (2023 15:33)  HR: 98 (2023 08:47) (76 - 98)  BP: 131/92 (2023 08:47) (111/58 - 131/92)  RR: 18 (2023 08:47) (18 - 18)  SpO2: 97% (2023 08:47) (94% - 97%)    Parameters below as of 2023 08:47  Patient On (Oxygen Delivery Method): room air    Constitutional: NAD, awake and alert  HEENT: PERR, EOMI, Normal Hearing, MMM  Neck: Soft and supple, No LAD, No JVD  Respiratory: Breath sounds are clear bilaterally, No wheezing, rales or rhonchi  Cardiovascular: S1 and S2, regular rate and rhythm, no Murmurs, gallops or rubs  Gastrointestinal: Bowel Sounds present, soft, nontender, nondistended, no guarding, no rebound  Extremities: No peripheral edema  Vascular: 2+ peripheral pulses  Neurological: A/O x 3, no focal deficits  Musculoskeletal: 5/5 strength b/l upper and lower extremities  Skin: No rashes    =======================================    INTERPRETATION OF TELEMETRY: SR    EC23: Diagnosis Line Sinus rhythm with occasional Premature ventricular complexes. Prolonged QT. Abnormal ECG    ========================================    LABS:                        17.3   7.85  )-----------( 170      ( 2023 07:41 )             48.9     -    137  |  108  |  15  ----------------------------<  118<H>  3.6   |  24  |  1.01    Ca    9.3      2023 08:46  Phos  3.5     04-25  Mg     2.3     -25    TPro  7.9  /  Alb  3.8  /  TBili  0.7  /  DBili  x   /  AST  91<H>  /  ALT  76  /  AlkPhos  99  04-25    PT/INR - ( 2023 05:23 )   PT: 13.5 sec;   INR: 1.16 ratio       PTT - ( 2023 05:23 )  PTT:32.1 sec    A1C 5.7    Trop 26.25    ,  HDL 40 LDL 77    CARDIAC TESTIN/25/23: TTE: Left ventricle systolic function appears mildly impaired (global) with estimated left ventricle ejection fraction 45-50 %. Mild diastolic dysfunction (stage I). Mild concentric left ventricular hypertrophy. The left atrium is mildly dilated. Mild to moderate mitral regurgitation. Aortic valve sclerosis.    RADIOLOGY & ADDITIONAL STUDIES:    23: MR Angio Neck No Cont: MRI BRAIN 1. Approximate 1.1 cm acute infarct left thalamocapsular region. No evidence of associated hemorrhage. 2. Bihemispheric altered white matter signal; a nonspecific finding which statistically reflects chronic microvascular ischemic change. 3. Additional findings described in detail above. MRA NECK and BRAIN 1. Bilateral vertebral arteries patent and codominant. 2. No evidence of hemodynamically significant stenosis bilateral extracranial carotid arteries. 3. No definitive evidence of hemodynamically significant stenosis, aneurysm or vascular malformation intracranial circulation, as visualized.     23. CT Head No Cont: No evidence of acute intracranial hemorrhage, midline shift or CT evidence of acute territorial infarct.

## 2023-04-26 NOTE — DISCHARGE NOTE PROVIDER - PROVIDER TOKENS
PROVIDER:[TOKEN:[[id number]],FOLLOWUP:[1 week],ESTABLISHEDPATIENT:[T]],PROVIDER:[TOKEN:[[id number]],FOLLOWUP:[2 months],ESTABLISHEDPATIENT:[T]]

## 2023-04-26 NOTE — DISCHARGE NOTE PROVIDER - HOSPITAL COURSE
Physical Exam:  Vital Signs Last 24 Hrs  T(C): 36.5 (26 Apr 2023 08:47), Max: 36.6 (25 Apr 2023 15:33)  T(F): 97.7 (26 Apr 2023 08:47), Max: 97.8 (25 Apr 2023 15:33)  HR: 98 (26 Apr 2023 08:47) (76 - 98)  BP: 131/92 (26 Apr 2023 08:47) (111/58 - 131/92)  RR: 18 (26 Apr 2023 08:47) (18 - 18)  SpO2: 97% (26 Apr 2023 08:47) (94% - 97%)    Parameters below as of 26 Apr 2023 08:47  Patient On (Oxygen Delivery Method): room air    Constitutional: NAD, awake and alert  HEENT: PERRLA, EOMI, MMM  Respiratory: Breath sounds are clear bilaterally, No wheezing, rales or rhonchi  Cardiovascular: S1 and S2, RRR, no murmurs, gallops or rubs  Gastrointestinal: +BS, soft, non-tender, non-distended, no CVA tenderness  Extremities: No peripheral edema, +DP pulses b/l  Neurological: A&O x 3, no focal deficits  Musculoskeletal: 5/5 strength b/l upper and lower extremities  Skin: Normal, skin warm and dry    Assessment/Plan:  68 y/o M with PMH CAD, HTN, former smoker, diverticulitis s/p colon resection, BPH s/p prostate surgery, cholecystectomy presented with right sided facial numbness. Admitted for:     #Acute CVA 1.1cm in left thalamocapsular region  #Right sided facial numbness and numbness of the right hand/fingers   - s/p telemetry - no events noted  - Not a candidate for TPA; NIHSS 0,  no FND on exam, symptoms have improved   - CT head: no acute hemorrhage  - MRI head noting acute infarct 1.1cm in left thalamocapsular region . No significant findings on MRA Head/neck   - LDL 67 , A1C 5.7  - Neuro checks Q4H wnl  - Passed dysphagia evaluation in ER -> DASH diet   - Blood glucose checks Q6H wnl  - c/w aspirin 81 mg daily   - PT evaluation  - Neurology consulted - Dr. Jensen , outpatient follow up in 8-10 weeks after discharge   -- Seen by neurology, started on plavix x21 days (already on ASA), low dose atorvastatin   - TTE showing mildly reduced systolic function with EF 45-50% - outpatient cardiology follow up    - MCOT placed prior to discharge    2. Hypertensive urgency   - /106 on admission   - s/p 10 mg IVP labetalol in the ER   - Increased amlodipine 10mg daily   - C/w lopressor, triamterene/HCTZ     3. Hyperglycemia   - Glucose 121, A1C 5.7    4. Elevated AST   - AST 91 -> 49  - monitor outpatient     5. Elevated Hb   - Hb 17.1 -> 17.3   - F/u with PCP or Heme/Onc outpt for further management     6. History of CAD, HTN, diverticulitis s/p colon resection, BPH s/p prostate surgery, cholecystectomy   - c/w home medications; verified with pt at the bedside    Dispo: discharge to home in stable condition    Final diagnosis, treatment plan, and follow-up recommendations were discussed and explained to the patient. The patient was given an opportunity to ask questions concerning the diagnosis and treatment plan. The patient acknowledged understanding of the diagnosis, treatment, and follow-up recommendations. The patient was advised to seek urgent care upon discharge if worsening symptoms develop prior to scheduled follow-up. Time spent on discharge included time with the patient, and also coordinating discharge care as outlined below.    Total time spent: 50 min

## 2023-04-26 NOTE — DISCHARGE NOTE PROVIDER - CARE PROVIDERS DIRECT ADDRESSES
[email or web address]

## 2023-04-26 NOTE — DISCHARGE NOTE PROVIDER - NSDCMRMEDTOKEN_GEN_ALL_CORE_FT
amLODIPine 10 mg oral tablet: 1 tab(s) orally once a day  Aspir 81 oral delayed release tablet: 1 tab(s) orally once a day  atorvastatin 10 mg oral tablet: 1 tab(s) orally once a day (at bedtime)  clopidogrel 75 mg oral tablet: 1 tab(s) orally once a day  metoprolol tartrate 100 mg oral tablet: 1 tab(s) orally 2 times a day  triamterene-hydrochlorothiazide 37.5 mg-25 mg oral capsule: 1 cap(s) orally once a day

## 2023-04-26 NOTE — DISCHARGE NOTE PROVIDER - NSDCCPCAREPLAN_GEN_ALL_CORE_FT
PRINCIPAL DISCHARGE DIAGNOSIS  Diagnosis: Acute CVA (cerebrovascular accident)  Assessment and Plan of Treatment: WHAT IS A STROKE? A stroke (CVA) occurs when blood flow to brain is interrupted causing lack of oxygen.  THINGS TO DO: (1) Manage your health conditions like diabetes or hypertension (2) Monitor your blood pressure (3) Avoid nicotine products – smoking can cause damage to your blood vessels and increase your risk for a stroke (4) Maintain a healthy weight (5) Stay active with exercise (6) Limit or avoid alcohol intake – alcohol can raise your blood pressure (7) Eat heart healthy foods like fruits, vegetables, and whole grains  MONITOR THESE SIGNS AND SYMPTOMS: (1) Loss of balance or confusion (2) Double vision or vision loss (3) Chest pain or shortness of breath (4) Trouble swallowing. If you experience any of these, DO alert your primary care provider, or return to the Emergency Department if you feel very sick.   ----------------------------------------------------------------------------  Found to have acute stroke of 1.1cm in left thalamocapsular region of the brain, consistent with presenting symptoms   Continue Aspirin 81mg once a day  Continue Plavix 75mg once a day (x21 days total, then stop)   Continue Atorvastatin 10mg once a day at bedtime   Blood pressure elevated during hospitalization and possible cause of acute stroke, amlodipine increased with improvement   Continue Metoprolol tartrate 100mg twice a day  Continue triamterene-hydrochlorothiazide 37.5 mg-25 mg once a day  Continue Amlodipine 10mg once a day  Follow up with Neurology Dr. Jensen 8-10 weeks after discharge  Follow up with Cardiology Dr. Seo within 1 week after discharge      SECONDARY DISCHARGE DIAGNOSES  Diagnosis: Hypertension  Assessment and Plan of Treatment:

## 2023-04-26 NOTE — DISCHARGE NOTE PROVIDER - ATTENDING DISCHARGE PHYSICAL EXAMINATION:
T(C): 36.5 (04-26-23 @ 08:47), Max: 36.5 (04-26-23 @ 08:47)  HR: 98 (04-26-23 @ 08:47) (91 - 98)  BP: 131/92 (04-26-23 @ 08:47) (111/58 - 131/92)  RR: 18 (04-26-23 @ 08:47) (18 - 18)  SpO2: 97% (04-26-23 @ 08:47) (96% - 97%)  AAOX3; NAD  RRR; No murmur  Lungs CTA bilaterally  AAOx3; Neuro exam non-focal   MR positive for CVA. ->now asymptomatic. GDT to be continued.

## 2023-04-26 NOTE — CONSULT NOTE ADULT - ASSESSMENT
68 y/o M with PMH CAD, HTN, diverticulitis s/p colon resection, BPH s/p prostate surgery, cholecystectomy presented with right sided facial numbness and numbness of the right hand/fingers. Found to have CVA 68 y/o M with PMH CAD, HTN, diverticulitis s/p colon resection, BPH s/p prostate surgery, cholecystectomy presented with right sided facial numbness and numbness of the right hand/fingers. Found to have CVA.    #Thalamic CVA.  - MRI BRAIN > Approximate 1.1 cm acute infarct left thalamocapsular region.  - Monitor on tele. no events noted  - Continue aspirin, atorvastatin, plavix  - A1C 5.7 | ,  HDL 40 LDL 77  - 4/25/23: TTE: EF 45-50 %. Mild to moderate mitral regurgitation. Aortic valve sclerosis.  - MCOT on discharge     #HTN. Chronic and stable. Continue Norvasc, metoprolol, triamterene/HCTZ.    #HLD. Continue atorvastatin.      Case d/w Dr. Patterson

## 2023-04-26 NOTE — PROGRESS NOTE ADULT - ASSESSMENT
68 y/o M with PMH CAD, HTN, diverticulitis s/p colon resection, BPH s/p prostate surgery presented to ED at 3.00 AM on 4/25/23 with c/o right sided facial numbness and numbness/inco-ordination of right hand. Pt reports he slept around 8-8:30 pm, awoke around 2 AM, felt a pins and needles sensation of the right side face and right hand, he was sleeping on his left side hence felt it unusual to have right facial numbess, he denied facial droop, difficulty speaking/expressing or slurred speech, motor weakness but had difficulty signing his name in ED. In ED, symptoms improved, NIHSS 0, /106. CT head: No acute hemorrhage or territorial infarct. Given 10 mg IVP labetalol    # Acute left thalamocapsular infract, minimal deficits, lacunar/ small vessel most likely due to HTN    # Multiple risk factor; HTN, CAD, prior smoker- LDL 67 , A1C 5.7    - Continue Plavix x 21 days, Continue ASA 81 mg daily  - LDL 67 - on low dose atorvastatin 10 mgs, LFT's were high, repeat level today 49/71  - As D/W pt and cardiologist, need LT monitoring, they will arrange it as OP  - Stroke education provided  - Can F/U with cardio in a week and with neuro in 8-10 weeks    Above D/W pt and YESI Villalba and Dr. Patterson - cardio in detail
66 y/o M presented with right sided facial numbness     #Acute CVA 1.1cm in left thalamocapsular region  #Right sided facial numbness and numbness of the right hand/fingers   - c/w telemetry    - Not a candidate for TPA; NIHSS 0,  no FND on exam, symptoms have improved   - CT head: no acute hemorrhage  - MRI head noting acute infarct 1.1cm in left thalamocapsular region . No significant findings on MRA Head/neck   - LDL 67 , A1C 5.7  - Neuro checks Q4H  - Passed dysphagia evaluation in ER -> DASH diet   - Blood glucose checks Q6H   - c/w aspirin 81 mg daily   - PT evaluation  - Neurology consult - Dr. Jensen   -- Seen by neurology, started on plavix x21 days (already on ASA), low dose atorvastatin   - TTE showing mildly reduced systolic function with EF 45-50% pending cardiology eval     2. Hypertensive urgency   - /106, monitor closely   - s/p 10 mg IVP labetalol in the ER   - Hydralazine PRN for SBP > 160   - Increased amlodipine 10mg daily to start tomorrow    3. Hyperglycemia   - Glucose 121, A1C 5.7    4. Elevated AST   - AST 91  - F/u hepatin function panel in AM, first dose lipitor tonight     5. Elevated Hb   - Hb 17.1, monitor closely   - If persistent elevation, f/u with PCP or Heme/Onc outpt     6. History of CAD, HTN, diverticulitis s/p colon resection, BPH s/p prostate surgery, cholecystectomy   - c/w home medications; verified with pt at the bedside    DVT ppx: Lovenox 40 mg subcutaneous daily   Code status: Full code (pt agrees to chest compressions and intubation if required).   Emergency contact: Esmer (daughter)

## 2023-04-26 NOTE — DISCHARGE NOTE PROVIDER - NSDCFUSCHEDAPPT_GEN_ALL_CORE_FT
[facility]  UROLOGY [address]  Scheduled Appointment: 06/23/2023    Mina Yap  [facility]  UROLOGY [address]  Scheduled Appointment: 06/23/2023

## 2023-04-26 NOTE — PROGRESS NOTE ADULT - SUBJECTIVE AND OBJECTIVE BOX
Pt feels better, right facial numbness / heaviness has improved    MRI brain shows acute infarct 1.1cm in left thalamo-capsular region  MRA Head/neck - significant stenosis   Started on plavix x 21 days, in addition to BASA),  LDL 67 - on low dose atorvastatin , LFT's were high, repeat level today reduced    TTE showing mildly reduced systolic function with EF 45-50%      ROS: As above, other ROS Negative    MEDICATIONS  (STANDING):  amLODIPine   Tablet 10 milliGRAM(s) Oral daily  aspirin enteric coated 81 milliGRAM(s) Oral daily  atorvastatin 10 milliGRAM(s) Oral at bedtime  clopidogrel Tablet 75 milliGRAM(s) Oral daily  enoxaparin Injectable 40 milliGRAM(s) SubCutaneous every 24 hours  metoprolol tartrate 100 milliGRAM(s) Oral two times a day  triamterene 37.5 mG/hydrochlorothiazide 25 mG Tablet 1 Tablet(s) Oral daily      Vital Signs Last 24 Hrs  T(C): 36.5 (26 Apr 2023 08:47), Max: 36.6 (25 Apr 2023 15:33)  T(F): 97.7 (26 Apr 2023 08:47), Max: 97.8 (25 Apr 2023 15:33)  HR: 98 (26 Apr 2023 08:47) (76 - 98)  BP: 131/92 (26 Apr 2023 08:47) (111/58 - 131/92)  BP(mean): --  RR: 18 (26 Apr 2023 08:47) (18 - 18)  SpO2: 97% (26 Apr 2023 08:47) (94% - 97%)    Parameters below as of 26 Apr 2023 08:47  Patient On (Oxygen Delivery Method): room air    Gen exam:  Normocephalic, in no distress, awake and alert.  HEENT: PERRLA, EOMI,   Neck: Supple.  Respiratory: Breath sounds are clear bilaterally  Cardiovascular: S1 and S2, regular  Extremities:  no edema  Vascular: Caritid Bruit - no  Musculoskeletal: no joint swelling/tenderness, no abnormal movements  Skin: No rashes      Neurological exam:  HF: A x O x 3. Appropriately interactive, normal affect. Speech fluent, No Aphasia. Naming /repetition intact   CN: RICHELLE, EOMI, VFF, facial sensation normal, no NLFD, tongue midline, Palate moves equally, SCM equal bilaterally  Motor: No pronator drift, Strength 5/5 in all 4 ext, normal bulk and tone, no tremor, rigidity.    Sens: Intact to light touch  Reflexes: Symmetric and normal, downgoing toes b/l  Coord:  No FNFA, dysmetria, JAME intact   Gait/Balance: Not tested    NIHSS: 0                          17.3   7.85  )-----------( 170      ( 26 Apr 2023 07:41 )             48.9     04-26    139  |  108  |  16  ----------------------------<  125<H>  3.6   |  26  |  1.11    Ca    9.1      26 Apr 2023 07:41  Phos  3.5     04-25  Mg     2.3     04-25    TPro  7.9  /  Alb  4.0  /  TBili  1.1  /  DBili  0.3  /  AST  49<H>  /  ALT  71  /  AlkPhos  80  04-26 04-25 Chol 159 LDL -- HDL 40<L> Trig 210<H>, 04-25 Chol 145 LDL -- HDL 39<L> Trig 201<H>    Radiology report:  -< from: MR Head No Cont (04.25.23 @ 14:39) >  1. Approximate 1.1 cm acute infarct left thalamocapsular region. No   evidence of associated hemorrhage.  2. Bihemispheric altered white matter signal; a nonspecific finding which   statistically reflects chronic microvascular ischemic change.  3. Additional findings described in detail above.    MRA NECK and BRAIN  1. Bilateral vertebral arteries patent and codominant.  2. No evidence of hemodynamically significant stenosis bilateral   extracranial carotid arteries.  3. No definitive evidence of hemodynamically significant stenosis,   aneurysm or vascular malformation intracranial circulation, as visualized.    
Chief Complaint: Patient is a 67y old  Male who presents with a chief complaint of Right sided facial numbness and numbness of the right hand/fingers (25 Apr 2023 10:34)      Interval events:   - MRI head noting acute infarct 1.1cm in left thalamocapsular region . No significant findings on MRA Head/neck   - Seen by neurology, started on plavix x21 days (already on ASA), low dose atorvastatin   - TTE showing mildly reduced systolic function with EF 45-50% pending cardiology eval     ROS:   Patient denies any current chest pain, SOB, cough, f/c/n/v/d, abd pain, LE edema, myalgias, dysuria, HA, dizziness  All other review of systems is negative unless indicated above    Physical Exam:  Vital Signs Last 24 Hrs  T(C): 36.6 (25 Apr 2023 15:33), Max: 36.6 (25 Apr 2023 05:32)  T(F): 97.8 (25 Apr 2023 15:33), Max: 97.8 (25 Apr 2023 05:32)  HR: 76 (25 Apr 2023 15:33) (74 - 81)  BP: 124/83 (25 Apr 2023 15:33) (124/83 - 177/106)  BP(mean): --  RR: 18 (25 Apr 2023 15:33) (18 - 18)  SpO2: 94% (25 Apr 2023 15:33) (94% - 98%)    Parameters below as of 25 Apr 2023 15:33  Patient On (Oxygen Delivery Method): room air    Constitutional: NAD, awake and alert  HEENT: PERRLA, EOMI, MMM  Respiratory: Breath sounds are clear bilaterally, No wheezing, rales or rhonchi  Cardiovascular: S1 and S2, RRR, no murmurs, gallops or rubs  Gastrointestinal: +BS, soft, non-tender, non-distended, no CVA tenderness  Extremities: No peripheral edema, +DP pulses b/l  Neurological: A&O x 3, no focal deficits  Musculoskeletal: 5/5 strength b/l upper and lower extremities  Skin: Normal, skin warm and dry    Labs:  04-25 @ 08:46  Glucose 118 mg/dL  HCO3 24 mmol/L  Chloride 108 mmol/L  Sodium 137 mmol/L>   Potassium 3.6 mmol/L  Creatinine 1.01 mg/dL  Calcium 9.3 mg/dL  BUN 15 mg/dL  eGFR 81 mL/min/1.73m2  Anion gap 5 mmol/L    WBC 8.86  Hemoglobin 17.0  Hemoatocrit 48.4  Platelet count 186      PT/INR - ( 25 Apr 2023 05:23 )   PT: 13.5 sec;   INR: 1.16 ratio         PTT - ( 25 Apr 2023 05:23 )  PTT:32.1 sec    Cardiac testing:  Troponin I, High Sensitivity Result: 26.25 ng/L (04-25-23 @ 04:15)        12 Lead ECG:   Ventricular Rate 76 BPM    Atrial Rate 76 BPM    P-R Interval 174 ms    QRS Duration 82 ms    Q-T Interval 442 ms    QTC Calculation(Bazett) 497 ms    P Axis 50 degrees    R Axis -18 degrees    T Axis 27 degrees    Diagnosis Line Sinus rhythm with occasional Premature ventricular complexes  Prolonged QT  Abnormal ECG  Confirmed by SANDY CAR MD ([id number]) on 4/25/2023 7:11:57 AM (04-25-23 @ 05:19)      < from: TTE Echo Complete w/o Contrast w/ Doppler (04.25.23 @ 09:57) >   Impression     Summary     Left ventricle systolic function appears mildly impaired (global) with   estimated left ventricle ejection fraction 45-50 %.   Mild diastolic dysfunction (stage I).   Mild concentric left ventricular hypertrophy.   The left atrium is mildly dilated.   Mild to moderate mitral regurgitation.   Aortic valve sclerosis.    < end of copied text >      Radiology:  < from: MR Head No Cont (04.25.23 @ 14:39) >  IMPRESSION:    MRI BRAIN  1. Approximate 1.1 cm acute infarct left thalamocapsular region. No   evidence of associated hemorrhage.  2. Bihemispheric altered white matter signal; a nonspecific finding which   statistically reflects chronic microvascular ischemic change.  3. Additional findings described in detail above.    MRA NECK and BRAIN  1. Bilateral vertebral arteries patent and codominant.  2. No evidence of hemodynamically significant stenosis bilateral   extracranial carotid arteries.  3. No definitive evidence of hemodynamically significant stenosis,   aneurysm or vascular malformation intracranial circulation, as visualized.    < end of copied text >  < from: CT Head No Cont (04.25.23 @ 04:11) >  IMPRESSION:    No evidence of acute intracranial hemorrhage,midline shift or CT   evidence of acute territorial infarct.    If the patient's symptoms persist, consider short interval follow-up head   CT or brain MRI if there are no MRI contraindications.    < end of copied text >      Medications:  MEDICATIONS  (STANDING):  aspirin enteric coated 81 milliGRAM(s) Oral daily  atorvastatin 10 milliGRAM(s) Oral at bedtime  clopidogrel Tablet 75 milliGRAM(s) Oral daily  enoxaparin Injectable 40 milliGRAM(s) SubCutaneous every 24 hours  metoprolol tartrate 100 milliGRAM(s) Oral two times a day  triamterene 37.5 mG/hydrochlorothiazide 25 mG Tablet 1 Tablet(s) Oral daily    MEDICATIONS  (PRN):  acetaminophen     Tablet .. 650 milliGRAM(s) Oral every 6 hours PRN Temp greater or equal to 38C (100.4F), Mild Pain (1 - 3)  aluminum hydroxide/magnesium hydroxide/simethicone Suspension 30 milliLiter(s) Oral every 4 hours PRN Dyspepsia  hydrALAZINE Injectable 10 milliGRAM(s) IV Push every 6 hours PRN SBP > 160  melatonin 3 milliGRAM(s) Oral at bedtime PRN Insomnia  ondansetron Injectable 4 milliGRAM(s) IV Push every 8 hours PRN Nausea and/or Vomiting

## 2023-04-28 DIAGNOSIS — R29.700 NIHSS SCORE 0: ICD-10-CM

## 2023-04-28 DIAGNOSIS — Z87.891 PERSONAL HISTORY OF NICOTINE DEPENDENCE: ICD-10-CM

## 2023-04-28 DIAGNOSIS — R20.0 ANESTHESIA OF SKIN: ICD-10-CM

## 2023-04-28 DIAGNOSIS — I10 ESSENTIAL (PRIMARY) HYPERTENSION: ICD-10-CM

## 2023-04-28 DIAGNOSIS — Z79.82 LONG TERM (CURRENT) USE OF ASPIRIN: ICD-10-CM

## 2023-04-28 DIAGNOSIS — I25.10 ATHEROSCLEROTIC HEART DISEASE OF NATIVE CORONARY ARTERY WITHOUT ANGINA PECTORIS: ICD-10-CM

## 2023-04-28 DIAGNOSIS — I16.0 HYPERTENSIVE URGENCY: ICD-10-CM

## 2023-04-28 DIAGNOSIS — I63.89 OTHER CEREBRAL INFARCTION: ICD-10-CM

## 2023-04-28 DIAGNOSIS — R73.9 HYPERGLYCEMIA, UNSPECIFIED: ICD-10-CM

## 2023-05-01 NOTE — ASU PREOPERATIVE ASSESSMENT, ADULT (IPARK ONLY) - SPO2 (%)
Preventive Care Visit  [facility]  Hemal Yao MD, Pediatrics  May 1, 2023  Assessment & Plan   9 year old 3 month old, here for preventive care.    Dung was seen today for well child and health maintenance.    Diagnoses and all orders for this visit:    Encounter for routine child health examination w/o abnormal findings  Doing well.   -     BEHAVIORAL/EMOTIONAL ASSESSMENT (60356)  -     SCREENING TEST, PURE TONE, AIR ONLY  -     SCREENING, VISUAL ACUITY, QUANTITATIVE, BILAT  -     MENINGOCOCCAL (MENQUADFI ) (2 YRS - 55 YRS)  -     HPV, IM (9-26 YRS) - Gardasil 9  -     TDAP 10-64Y (ADACEL,BOOSTRIX)  -     PRIMARY CARE FOLLOW-UP SCHEDULING; Future    Failed vision screen  She was prescribed glasses but not wearing them for more than one year. Failed vision screen today in the clinic.   -     Peds Eye  Referral; Future          Growth      Normal height and weight    Immunizations   Appropriate vaccinations were ordered.  Immunizations Administered     Name Date Dose VIS Date Route    HPV9 5/1/23  2:50 PM 0.5 mL 08/06/2021, Given Today Intramuscular    MENINGOCOCCAL ACWY (MENQUADFI ) 5/1/23  2:50 PM 0.5 mL 08/15/2019, Given Today Intramuscular    TDAP (Adacel,Boostrix) 5/1/23  2:50 PM 0.5 mL 08/06/2021, Given Today Intramuscular        Anticipatory Guidance    Reviewed age appropriate anticipatory guidance. This includes body changes with puberty and sexuality, including STIs as appropriate.    SOCIAL/ FAMILY:    School/ homework  NUTRITION:    Healthy food choices  HEALTH/ SAFETY:    Adequate sleep/ exercise    Sleep issues    Dental care    Referrals/Ongoing Specialty Care  None  Verbal Dental Referral: Verbal dental referral was given      Subjective       5/1/2023     2:16 PM   Additional Questions   Accompanied by mom   Questions for today's visit No   Surgery, major illness, or injury since last physical No          View : No data to display.                      View :  "No data to display.                 No results for input(s): CHOL, HDL, LDL, TRIG, CHOLHDLRATIO in the last 95836 hours.         View : No data to display.                   View : No data to display.                   View : No data to display.                   View : No data to display.                   View : No data to display.                   View : No data to display.                   View : No data to display.                   View : No data to display.              Psycho-Social/Depression - PSC-17 required for C&TC through age 18  General screening:  PSC-17 PASS (<15 pass), no follow up necessary         Objective     Exam  BP 98/65   Pulse 71   Temp 98.5  F (36.9  C) (Oral)   Ht 4' 10.74\" (1.492 m)   Wt 71 lb 6.4 oz (32.4 kg)   SpO2 99%   BMI 14.55 kg/m    68 %ile (Z= 0.47) based on CDC (Girls, 2-20 Years) Stature-for-age data based on Stature recorded on 5/1/2023.  19 %ile (Z= -0.89) based on CDC (Girls, 2-20 Years) weight-for-age data using vitals from 5/1/2023.  5 %ile (Z= -1.60) based on CDC (Girls, 2-20 Years) BMI-for-age based on BMI available as of 5/1/2023.  Blood pressure %shalom are 33 % systolic and 65 % diastolic based on the 2017 AAP Clinical Practice Guideline. This reading is in the normal blood pressure range.    Vision Screen  Vision Screen Details  Reason Vision Screen Not Completed: Other    Hearing Screen  RIGHT EAR  1000 Hz on Level 40 dB (Conditioning sound): Pass  1000 Hz on Level 20 dB: Pass  2000 Hz on Level 20 dB: Pass  4000 Hz on Level 20 dB: Pass  6000 Hz on Level 20 dB: Pass  8000 Hz on Level 20 dB: Pass  LEFT EAR  8000 Hz on Level 20 dB: Pass  6000 Hz on Level 20 dB: Pass  4000 Hz on Level 20 dB: Pass  2000 Hz on Level 20 dB: Pass  1000 Hz on Level 20 dB: Pass  500 Hz on Level 25 dB: Pass  RIGHT EAR  500 Hz on Level 25 dB: Pass  Results  Hearing Screen Results: Pass      Physical Exam  GENERAL: Active, alert, in no acute distress.  SKIN: Clear. No significant rash, " abnormal pigmentation or lesions  HEAD: Normocephalic  EYES: Pupils equal, round, reactive, Extraocular muscles intact. Normal conjunctivae.  EARS: Normal canals. Tympanic membranes are normal; gray and translucent.  NOSE: Normal without discharge.  MOUTH/THROAT: Clear. No oral lesions. Teeth without obvious abnormalities.  NECK: Supple, no masses.  No thyromegaly.  LYMPH NODES: No adenopathy  LUNGS: Clear. No rales, rhonchi, wheezing or retractions  HEART: Regular rhythm. Normal S1/S2. No murmurs. Normal pulses.  ABDOMEN: Soft, non-tender, not distended, no masses or hepatosplenomegaly. Bowel sounds normal.   NEUROLOGIC: No focal findings. Cranial nerves grossly intact: DTR's normal. Normal gait, strength and tone  BACK: Spine is straight, no scoliosis.  EXTREMITIES: Full range of motion, no deformities      Screening Questionnaire for Pediatric Immunization    Is the child sick today?   No   Does the child have allergies to medications, food, a vaccine component, or latex?   No   Has the child had a serious reaction to a vaccine in the past?   No   Does the child have a long-term health problem with lung, heart, kidney or metabolic disease (e.g., diabetes), asthma, a blood disorder, no spleen, complement component deficiency, a cochlear implant, or a spinal fluid leak?  Is he/she on long-term aspirin therapy?   No   If the child to be vaccinated is 2 through 4 years of age, has a healthcare provider told you that the child had wheezing or asthma in the  past 12 months?   No   If your child is a baby, have you ever been told he or she has had intussusception?   No   Has the child, sibling or parent had a seizure, has the child had brain or other nervous system problems?   No   Does the child have cancer, leukemia, AIDS, or any immune system         problem?   No   Does the child have a parent, brother, or sister with an immune system problem?   No   In the past 3 months, has the child taken medications that affect  the immune system such as prednisone, other steroids, or anticancer drugs; drugs for the treatment of rheumatoid arthritis, Crohn s disease, or psoriasis; or had radiation treatments?   No   In the past year, has the child received a transfusion of blood or blood products, or been given immune (gamma) globulin or an antiviral drug?   No   Is the child/teen pregnant or is there a chance that she could become       pregnant during the next month?   No   Has the child received any vaccinations in the past 4 weeks?   No               Immunization questionnaire answers were all negative.      Hemal Yao MD  [facility]   96

## 2023-05-22 PROBLEM — I25.10 ATHEROSCLEROTIC HEART DISEASE OF NATIVE CORONARY ARTERY WITHOUT ANGINA PECTORIS: Chronic | Status: ACTIVE | Noted: 2023-04-25

## 2023-06-23 ENCOUNTER — APPOINTMENT (OUTPATIENT)
Dept: UROLOGY | Facility: CLINIC | Age: 68
End: 2023-06-23
Payer: COMMERCIAL

## 2023-06-23 VITALS
BODY MASS INDEX: 30.34 KG/M2 | DIASTOLIC BLOOD PRESSURE: 76 MMHG | SYSTOLIC BLOOD PRESSURE: 117 MMHG | OXYGEN SATURATION: 97 % | RESPIRATION RATE: 16 BRPM | HEART RATE: 82 BPM | HEIGHT: 72 IN | WEIGHT: 224 LBS

## 2023-06-23 DIAGNOSIS — R97.20 ELEVATED PROSTATE, SPECIFIC ANTIGEN [PSA]: ICD-10-CM

## 2023-06-23 PROCEDURE — 99214 OFFICE O/P EST MOD 30 MIN: CPT

## 2023-06-25 PROBLEM — R97.20 ELEVATED PSA: Status: ACTIVE | Noted: 2022-06-22

## 2023-06-25 NOTE — LETTER BODY
[Dear  ___] : Dear  [unfilled], [Courtesy Letter:] : I had the pleasure of seeing your patient, [unfilled], in my office today. [Please see my note below.] : Please see my note below. [Sincerely,] : Sincerely, [FreeTextEntry3] : Mina Yap MD\par  of Urology\par [facility]\par \par [facility]\par Offices:\par [address], [facility]\par [address] [address]\par [address] [facility], [address]\par \par TEL: [phone]\par FAX: [phone]

## 2023-06-25 NOTE — ASSESSMENT
[FreeTextEntry1] : Reviewed records.\par Discussed labs. \par 5/6/23:\par Testosterone: 448\par PSA: 2.2/30.9\par \par Erectile dysfunction:\par Discussed treatment options.  Would like to try tadalafil 5 mg daily.\par We will check with his cardiologist: Dr Seo and will let me know.\par \par Elevated PSA:\par PSA trended down.\par \par Benign Prostatic Hyperplasia:\par Minimal postvoid residual.\par Improved IPSS score from 19/4 preoperatively to 4/1.\par \par Return to office in 6 months or sooner if any issues: will do Uroflo/PVR.

## 2023-06-25 NOTE — HISTORY OF PRESENT ILLNESS
[FreeTextEntry1] : 67 year old male presents for follow up.\par Had minor stroke in April.  No residual neurological deficit.\par Off Flomax. Stream better, daytime frequency every 2 to 3 hours or so and nocturia 0 to 1 x. \par Still has on and off post void dribbling. \par Denies hesitancy, straining, intermittency, urgency, incontinence, sense of incomplete emptying. \par Denies dysuria, hematuria, lower abdominal or flank pain, nausea, vomiting, fever, chills or rigors. \par Rates erections 3-4/. Has antegrade ejaculation but decreased amount.\par \par Status post Rezum: Transurethral water vapor thermotherapy on 12/15/22 at [facility]. \par Post procedure required indwelling Rios catheter for 4 weeks. \par \par Seen on 6/22/22 for history of Benign Prostatic Hyperplasia. \par Started taking Flomax 1 week ago, never took Finasteride. Was taking Prostate supplements. \par Had difficulty urinating: slow flow, hesitancy, frequency and urgency, started left over Flomax. Urinating better. \par Sits to urinate, stream splayed, daytime frequency 2-3 hours, nocturia 1 x. \par No hesitancy or urinary incontinence. Had off and on sense of incomplete emptying.\par Denied dysuria, hematuria, lower abdominal or flank pain, nausea, vomiting, fever, chills or rigors. \par Rated erections 3-4/5. Weaker since starting Flomax. Able to attain, maintain and penetrate. \par Normal libido and decreased or no ejaculation since starting Flomax. \par Has history of Elevated PSA. No Prostate biopsy. Has had MRI Prostate. Prostate volume: 66 cc. No MRI suspicious lesion. \par Per Patient recent PSA: 3.8 about a year ago. \par No family history of Prostate cancer. \par \par Has seen Mckay Tavera and Graciela in the past.

## 2023-06-28 ENCOUNTER — APPOINTMENT (OUTPATIENT)
Dept: NEUROLOGY | Facility: CLINIC | Age: 68
End: 2023-06-28
Payer: COMMERCIAL

## 2023-06-28 ENCOUNTER — NON-APPOINTMENT (OUTPATIENT)
Age: 68
End: 2023-06-28

## 2023-06-28 VITALS
TEMPERATURE: 97.6 F | SYSTOLIC BLOOD PRESSURE: 117 MMHG | BODY MASS INDEX: 28.71 KG/M2 | WEIGHT: 212 LBS | HEART RATE: 69 BPM | DIASTOLIC BLOOD PRESSURE: 78 MMHG | HEIGHT: 72 IN

## 2023-06-28 DIAGNOSIS — G47.9 SLEEP DISORDER, UNSPECIFIED: ICD-10-CM

## 2023-06-28 PROCEDURE — 99214 OFFICE O/P EST MOD 30 MIN: CPT

## 2023-06-28 PROCEDURE — 95806 SLEEP STUDY UNATT&RESP EFFT: CPT

## 2023-06-28 RX ORDER — FINASTERIDE 5 MG/1
5 TABLET, FILM COATED ORAL
Refills: 0 | Status: DISCONTINUED | COMMUNITY
End: 2023-06-28

## 2023-06-28 RX ORDER — TRIAMTERENE AND HYDROCHLOROTHIAZIDE 37.5; 25 MG/1; MG/1
37.5-25 CAPSULE ORAL DAILY
Refills: 0 | Status: ACTIVE | COMMUNITY

## 2023-06-28 RX ORDER — AMLODIPINE BESYLATE 10 MG/1
10 TABLET ORAL DAILY
Refills: 0 | Status: ACTIVE | COMMUNITY

## 2023-06-28 RX ORDER — ATORVASTATIN CALCIUM 10 MG/1
10 TABLET, FILM COATED ORAL
Refills: 0 | Status: ACTIVE | COMMUNITY

## 2023-06-28 RX ORDER — ASPIRIN 81 MG
81 TABLET, DELAYED RELEASE (ENTERIC COATED) ORAL
Refills: 0 | Status: ACTIVE | COMMUNITY

## 2023-06-28 RX ORDER — TAMSULOSIN HYDROCHLORIDE 0.4 MG/1
0.4 CAPSULE ORAL
Refills: 0 | Status: DISCONTINUED | COMMUNITY
End: 2023-06-28

## 2023-06-28 RX ORDER — CLOPIDOGREL BISULFATE 75 MG/1
75 TABLET, FILM COATED ORAL DAILY
Refills: 0 | Status: DISCONTINUED | COMMUNITY

## 2023-06-29 NOTE — DISCUSSION/SUMMARY
[FreeTextEntry1] : 67-year-old M with PMHx of CAD, HTN, colon resection for diverticulitis, prostrate surgery presented to [facility] ED on 4/25/2023 with c/o of right-sided facial numbness and incoordination/numbness of right hand. NIHSS 0, /106, CTH no hemorrhage or territorial infarct. MRI of the brain revealed 1.1 cm acute infarct left thalamic capsular region, MRA head and neck no LVO or significant stenosis.  In addition to aspirin clopidogrel 75 Mg daily was added, LDL 67, he was continued on atorvastatin 10 mg.  Patient has since followed up with his cardiologist, he he had cardiac monitoring x  4 weeks. Pt is back to his baseline functioning, he has no complaints, has been having cough on and off, also admits to snoring and sleeping very poorly\par \par #Acute left thalamic capsular infarct, most likely small vessel related to hypertension, no residual deficits\par \par -Has completed clopidogrel 3 weeks, continue aspirin 81 Mg daily\par -Continue atorvastatin 10 Mg daily, continue monitoring LDL, aim to keep less than 70\par -Follow-up cardiac monitoring with Dr. Seo\par -Patient educated regarding stroke, continue to monitor his blood pressure\par -And advised to follow-up with PMD in regard to recent cough\par \par #Sleep disturbance snoring most likely sleep apnea\par \par -Recommended home sleep study

## 2023-06-29 NOTE — PHYSICAL EXAM
[General Appearance - Alert] : alert [General Appearance - In No Acute Distress] : in no acute distress [Oriented To Time, Place, And Person] : oriented to person, place, and time [Impaired Insight] : insight and judgment were intact [Affect] : the affect was normal [Person] : oriented to person [Place] : oriented to place [Time] : oriented to time [Registration Intact] : recent registration memory intact [Concentration Intact] : normal concentrating ability [Naming Objects] : no difficulty naming common objects [Repeating Phrases] : no difficulty repeating a phrase [Fluency] : fluency intact [Comprehension] : comprehension intact [Past History] : adequate knowledge of personal past history [Cranial Nerves Optic (II)] : visual acuity intact bilaterally,  visual fields full to confrontation, pupils equal round and reactive to light [Cranial Nerves Oculomotor (III)] : extraocular motion intact [Cranial Nerves Trigeminal (V)] : facial sensation intact symmetrically [Cranial Nerves Facial (VII)] : face symmetrical [Cranial Nerves Vestibulocochlear (VIII)] : hearing was intact bilaterally [Cranial Nerves Glossopharyngeal (IX)] : tongue and palate midline [Cranial Nerves Accessory (XI - Cranial And Spinal)] : head turning and shoulder shrug symmetric [Cranial Nerves Hypoglossal (XII)] : there was no tongue deviation with protrusion [Motor Tone] : muscle tone was normal in all four extremities [Motor Strength] : muscle strength was normal in all four extremities [No Muscle Atrophy] : normal bulk in all four extremities [Sensation Tactile Decrease] : light touch was intact [Abnormal Walk] : normal gait [Balance] : balance was intact [2+] : Brachioradialis left 2+ [1+] : Patella left 1+ [0] : Ankle jerk left 0 [Past-pointing] : there was no past-pointing [Tremor] : no tremor present [Plantar Reflex Right Only] : normal on the right [Plantar Reflex Left Only] : normal on the left [PERRL With Normal Accommodation] : pupils were equal in size, round, reactive to light, with normal accommodation [Extraocular Movements] : extraocular movements were intact [Full Visual Field] : full visual field [Outer Ear] : the ears and nose were normal in appearance [Hearing Threshold Finger Rub Not [place]] : hearing was normal [Neck Cervical Mass (___cm)] : no neck mass was observed [] : no respiratory distress [Auscultation Breath Sounds / Voice Sounds] : lungs were clear to auscultation bilaterally [Heart Rate And Rhythm] : heart rate was normal and rhythm regular [Heart Sounds] : normal S1 and S2 [Arterial Pulses Carotid] : carotid pulses were normal with no bruits [Edema] : there was no peripheral edema [Involuntary Movements] : no involuntary movements were seen

## 2023-06-29 NOTE — DATA REVIEWED
[de-identified] : 4/25/23: MRI  brain: acute infarct 1.1 cm in the left thalamic capsular region, MRA of the head and neck revealed no LVO or significant stenosis

## 2023-06-29 NOTE — HISTORY OF PRESENT ILLNESS
[FreeTextEntry1] : 67-year-old right-handed male with PMHx of CAD, HTN, colon resection for diverticulitis, prostrate surgery presented to [facility] ED on 4/25/2023 with complaints of right-sided facial numbness and incoordination and numbness of right hand.  Patient reported he had slept around 8:30 PM the previous night, awoke at 2 AM felt pins and needle sensation of the right side of face and right hand, he had been sleeping on his left side hence it was unusual for him to have right-sided facial numbness, there were no associated symptoms, in the ED NIH stroke scale 0, blood pressure 177/106, CT head revealed no hemorrhage or territorial infarct.  Patient was given labetalol IVP 10 Mg X1.  Patient was started on aspirin, admitted to telemetry monitoring, MRI of the brain done revealed acute infarct 1.1 cm in the left thalamic capsular region, MRA of the head and neck revealed no LVO or significant stenosis.  In addition to aspirin clopidogrel 75 Mg daily was added, LDL was 67, he was continued on low-dose atorvastatin.  Patient has since followed up with his cardiologist, he he had cardiac monitoring done for 4 weeks, he reports he has not been notified of any arrhythmias.\par \par Patient remains well, he is back to his baseline functioning, he has no complaints of numbness or speech disturbance, he does however report that he has been having cough on and off, may have been exposed to some mold, upon further history, he also admits to sleeping very poorly, he reports he used to snore tremendously, he probably feels he may have sleep apnea.

## 2023-06-29 NOTE — REVIEW OF SYSTEMS
[Sleep Disturbances] : sleep disturbances [Cough] : cough [Negative] : Heme/Lymph [As Noted in HPI] : as noted in HPI [FreeTextEntry4] : snoring

## 2023-06-30 ENCOUNTER — APPOINTMENT (OUTPATIENT)
Dept: ELECTROPHYSIOLOGY | Facility: CLINIC | Age: 68
End: 2023-06-30

## 2023-06-30 ENCOUNTER — NON-APPOINTMENT (OUTPATIENT)
Age: 68
End: 2023-06-30

## 2023-08-16 NOTE — ED ADULT TRIAGE NOTE - TEMPERATURE IN CELSIUS (DEGREES C)
36.4 H Plasty Text: Given the location of the defect, shape of the defect and the proximity to free margins a H-plasty was deemed most appropriate for repair.  Using a sterile surgical marker, the appropriate advancement arms of the H-plasty were drawn incorporating the defect and placing the expected incisions within the relaxed skin tension lines where possible. The area thus outlined was incised deep to adipose tissue with a #15 scalpel blade. The skin margins were undermined to an appropriate distance in all directions utilizing iris scissors.  The opposing advancement arms were then advanced into place in opposite direction and anchored with interrupted buried subcutaneous sutures.

## 2024-01-05 ENCOUNTER — APPOINTMENT (OUTPATIENT)
Dept: NEUROLOGY | Facility: CLINIC | Age: 69
End: 2024-01-05
Payer: COMMERCIAL

## 2024-01-05 VITALS
TEMPERATURE: 97.8 F | HEART RATE: 85 BPM | HEIGHT: 72 IN | DIASTOLIC BLOOD PRESSURE: 85 MMHG | WEIGHT: 226 LBS | SYSTOLIC BLOOD PRESSURE: 127 MMHG | BODY MASS INDEX: 30.61 KG/M2

## 2024-01-05 DIAGNOSIS — I63.81 OTHER CEREBRAL INFARCTION DUE TO OCCLUSION OR STENOSIS OF SMALL ARTERY: ICD-10-CM

## 2024-01-05 PROCEDURE — 99214 OFFICE O/P EST MOD 30 MIN: CPT

## 2024-01-05 NOTE — HISTORY OF PRESENT ILLNESS
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  He was evaluated in April 2023 for acute lacunar infarct with symptoms of right hand and right face numbness.  NIH score was a 0 blood pressure was elevated and is now being managed medically.  Patient completed 3 months of Plavix and is now taking a daily aspirin 81 mg and atorvastatin 10 mg.  He has followed up with cardiology sees him every 3 months.  And has not followed up with a home sleep study as of yet.  Patient denies any new focal deficits, weakness, numbness, vision or hearing changes.

## 2024-01-05 NOTE — ASSESSMENT
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 He was evaluated in April 2023 for acute lacunar infarct with symptoms of right hand and right face numbness. NIH score was a 0 blood pressure was elevated and is now being managed medically. Patient completed 3 months of Plavix and is now taking a daily aspirin 81 mg and atorvastatin 10 mg. He has followed up with cardiology sees him every 3 months. And has not followed up with a home sleep study as of yet. Patient denies any new focal deficits, weakness, numbness, vision or hearing changes. Neurological exam is unremarkable today.   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  I will provide a letter to his employer stating this.  Plan: Continue ASA 81 mg and statin therapy daily. Continue to control blood pressure and follow with primary and cardiology regularly. Follow-up as needed.

## 2024-01-05 NOTE — PHYSICAL EXAM
[FreeTextEntry1] : GENERAL PHYSICAL EXAM: GEN: no distress, normal affect EYES: sclera white, conjunctiva clear, no nystagmus PULM: no respiratory distress EXT: no edema, no cyanosis MSK: muscle tone and strength normal SKIN: warm, dry, no rash or lesion on exposed skin   NEUROLOGICAL EXAM: Mental Status Orientation: alert and oriented to person, place, time, and situation Language: clear and fluent, intact comprehension and repetition   Cranial Nerves II: visual fields full to confrontation III, IV, VI: PERRL, EOMI V, VII: facial sensation and movement intact and symmetric VIII: hearing intact IX, X: uvula midline, soft palate elevates normally XI: BL shoulder shrug intact XII: tongue midline   Motor Bilateral muscle strength 5/5 in UE and LE, proximally and distally Tone and bulk are normal in upper and lower limbs   Sensation Intact to light touch in all 4 EXTs   Reflex 1+ in BL biceps, brachioradialis, patella   Coordination Normal FTN bilaterally   Gait Normal stance, stride, and pivot turn Tandem walk intact Negative Romberg.

## 2024-01-19 ENCOUNTER — APPOINTMENT (OUTPATIENT)
Dept: UROLOGY | Facility: CLINIC | Age: 69
End: 2024-01-19
Payer: COMMERCIAL

## 2024-01-19 VITALS
SYSTOLIC BLOOD PRESSURE: 123 MMHG | WEIGHT: 226 LBS | HEIGHT: 72 IN | BODY MASS INDEX: 30.61 KG/M2 | DIASTOLIC BLOOD PRESSURE: 81 MMHG

## 2024-01-19 DIAGNOSIS — Z12.5 ENCOUNTER FOR SCREENING FOR MALIGNANT NEOPLASM OF PROSTATE: ICD-10-CM

## 2024-01-19 DIAGNOSIS — N40.1 BENIGN PROSTATIC HYPERPLASIA WITH LOWER URINARY TRACT SYMPMS: ICD-10-CM

## 2024-01-19 DIAGNOSIS — N52.9 MALE ERECTILE DYSFUNCTION, UNSPECIFIED: ICD-10-CM

## 2024-01-19 DIAGNOSIS — N13.8 BENIGN PROSTATIC HYPERPLASIA WITH LOWER URINARY TRACT SYMPMS: ICD-10-CM

## 2024-01-19 PROCEDURE — 99214 OFFICE O/P EST MOD 30 MIN: CPT

## 2024-01-21 PROBLEM — Z12.5 SCREENING PSA (PROSTATE SPECIFIC ANTIGEN): Status: ACTIVE | Noted: 2024-01-21

## 2024-01-21 PROBLEM — N40.1 BPH WITH OBSTRUCTION/LOWER URINARY TRACT SYMPTOMS: Status: ACTIVE | Noted: 2022-06-22

## 2024-01-21 NOTE — ASSESSMENT
[FreeTextEntry1] : Benign Prostatic Hyperplasia: Urinating well.   PSA Screening:  Will do it with PCP.   Erectile dysfunction: Discussed treatment options. Will try Tadalafil.  Recommended to start with 5 mg and can take it daily.  If partial response can increase to 2 tabs (10 mg) and up to 4 tabs (20 mg).  If dose is 10 or 20 mg use as needed and not to take 2 consecutive days.  The patient understands that these medications are not initiators of erection and that he will still require sexual stimulation. He was advised to take the medication 30 to 60 minutes prior to sexual activity and that the efficacy of the medication is decreased following a high-fat meal or concurrent use of alcohol. Explained the common adverse effects of therapy including, but not limited to headache, flushing, upset stomach, nasal congestion, abnormal vision, back pain, and myalgia. Asked patient to stop taking the medicine if he develops chest pain, visual or auditory disturbance. Explained priapism- if erection does not go down after ejaculation or lasts more than 4 hours to present to emergency room.   Return to office in 6 months or sooner if any issues: will do uroflow and check post void residual.

## 2024-01-21 NOTE — LETTER BODY
[Dear  ___] : Dear  [unfilled], [Courtesy Letter:] : I had the pleasure of seeing your patient, [unfilled], in my office today. [Please see my note below.] : Please see my note below. [Sincerely,] : Sincerely, [FreeTextEntry3] : Mina Yap MD\par   of Urology\par  [facility]\par  \par  [facility]\par  Offices:\par  [address] [facility]\par  [address] [address]\par  [address] [facility], [address]\par  \par  TEL: [phone]\par  FAX: [phone]

## 2024-01-21 NOTE — PHYSICAL EXAM
[Normal Appearance] : normal appearance [General Appearance - In No Acute Distress] : no acute distress [Abdomen Soft] : soft [Abdomen Tenderness] : non-tender [] : no respiratory distress [Oriented To Time, Place, And Person] : oriented to person, place, and time [FreeTextEntry1] : normal peripheral circulation  [de-identified] : digital rectal exam: deferred per Patient request

## 2024-01-21 NOTE — HISTORY OF PRESENT ILLNESS
[FreeTextEntry1] : Primary care physician/ Cardiologist: Dr Carmen Seo.   68-year-old male presents for follow up. Off Flomax. Stream better, daytime frequency every 2 to 3 hours or so and nocturia 0 to 1 x.  Still has on and off post void dribbling.  Denies hesitancy, straining, intermittency, urgency, incontinence or sense of incomplete emptying.  Denies dysuria, hematuria, lower abdominal or flank pain, nausea, vomiting, fever, chills or rigors.  Rates erections 3-4/5. Has antegrade ejaculation but decreased amount. Had minor stroke in April 2023.  No residual neurological deficit.  Status post Rezum: Transurethral water vapor thermotherapy on 12/15/22 at [facility].  Post procedure required indwelling Rios catheter for 4 weeks.   Seen on 6/22/22 for history of Benign Prostatic Hyperplasia.  Started taking Flomax 1 week ago, never took Finasteride. Was taking Prostate supplements.  Had difficulty urinating: slow flow, hesitancy, frequency and urgency, started left over Flomax. Urinating better.  Sits to urinate, stream splayed, daytime frequency 2-3 hours, nocturia 1 x.  No hesitancy or urinary incontinence. Had off and on sense of incomplete emptying. Denied dysuria, hematuria, lower abdominal or flank pain, nausea, vomiting, fever, chills or rigors.  Rated erections 3-4/5. Weaker since starting Flomax. Able to attain, maintain and penetrate.  Normal libido and decreased or no ejaculation since starting Flomax.  Has history of Elevated PSA. No Prostate biopsy. Has had MRI Prostate. Prostate volume: 66 cc. No MRI suspicious lesion.  Per Patient recent PSA: 3.8 about a year ago.  No family history of Prostate cancer.   Has seen Mckay Tavera and Graciela in the past.

## 2024-05-16 NOTE — PATIENT PROFILE ADULT. - NS PRO OT REFERRAL QUES 1 YN
----- Message from Raymond Willson sent at 5/16/2024 11:08 AM CDT -----  Regarding: Zoraida  Type:Patient Callback     Who called: Zoraida     What is the request in detail: Pt stated that she needs the doctor to change her medication for her throat because it is too expensive. Please reach out to the patient.    Can the clinic reply by [facility]? No     Would the patient rather a call back or a response via [facility]? Callback     Best call back number: [phone]      Additional Information:   no

## 2024-05-28 RX ORDER — TADALAFIL 5 MG/1
5 TABLET ORAL
Qty: 90 | Refills: 0 | Status: ACTIVE | COMMUNITY
Start: 2024-01-19 | End: 1900-01-01

## 2024-07-19 ENCOUNTER — APPOINTMENT (OUTPATIENT)
Dept: UROLOGY | Facility: CLINIC | Age: 69
End: 2024-07-19
Payer: COMMERCIAL

## 2024-07-19 DIAGNOSIS — N52.9 MALE ERECTILE DYSFUNCTION, UNSPECIFIED: ICD-10-CM

## 2024-07-19 DIAGNOSIS — N40.1 BENIGN PROSTATIC HYPERPLASIA WITH LOWER URINARY TRACT SYMPMS: ICD-10-CM

## 2024-07-19 DIAGNOSIS — N13.8 BENIGN PROSTATIC HYPERPLASIA WITH LOWER URINARY TRACT SYMPMS: ICD-10-CM

## 2024-07-19 DIAGNOSIS — Z12.5 ENCOUNTER FOR SCREENING FOR MALIGNANT NEOPLASM OF PROSTATE: ICD-10-CM

## 2024-07-19 PROCEDURE — 99214 OFFICE O/P EST MOD 30 MIN: CPT | Mod: 25

## 2024-07-19 PROCEDURE — 51741 ELECTRO-UROFLOWMETRY FIRST: CPT

## 2024-09-20 ENCOUNTER — OUTPATIENT (OUTPATIENT)
Dept: OUTPATIENT SERVICES | Facility: HOSPITAL | Age: 69
LOS: 1 days | Discharge: HOME | End: 2024-09-20
Payer: COMMERCIAL

## 2024-09-20 ENCOUNTER — APPOINTMENT (OUTPATIENT)
Dept: RADIATION ONCOLOGY | Facility: CLINIC | Age: 69
End: 2024-09-20
Payer: COMMERCIAL

## 2024-09-20 VITALS
RESPIRATION RATE: 16 BRPM | WEIGHT: 222 LBS | HEIGHT: 72 IN | OXYGEN SATURATION: 97 % | SYSTOLIC BLOOD PRESSURE: 132 MMHG | BODY MASS INDEX: 30.07 KG/M2 | DIASTOLIC BLOOD PRESSURE: 78 MMHG | HEART RATE: 81 BPM

## 2024-09-20 DIAGNOSIS — Z87.19 PERSONAL HISTORY OF OTHER DISEASES OF THE DIGESTIVE SYSTEM: Chronic | ICD-10-CM

## 2024-09-20 DIAGNOSIS — Z98.890 OTHER SPECIFIED POSTPROCEDURAL STATES: Chronic | ICD-10-CM

## 2024-09-20 DIAGNOSIS — Z90.49 ACQUIRED ABSENCE OF OTHER SPECIFIED PARTS OF DIGESTIVE TRACT: Chronic | ICD-10-CM

## 2024-09-20 PROCEDURE — G2211 COMPLEX E/M VISIT ADD ON: CPT | Mod: NC

## 2024-09-20 PROCEDURE — 99205 OFFICE O/P NEW HI 60 MIN: CPT

## 2024-09-20 NOTE — VITALS
[Date: ____________] : Patient's last distress assessment performed on [unfilled]. [Maximal Pain Intensity: 0/10] : 0/10 [90: Able to carry normal activity; minor signs or symptoms of disease.] : 90: Able to carry normal activity; minor signs or symptoms of disease.  [1 - Distress Level] : Distress Level: 1

## 2024-09-20 NOTE — DISEASE MANAGEMENT
[Clinical] : TNM Stage: c [FreeTextEntry4] : SCCA skin left temple [TTNM] : 1 [NTNM] : 0 [MTNM] : 0 [I] : I

## 2024-09-20 NOTE — HISTORY OF PRESENT ILLNESS
[FreeTextEntry1] : 67 y/o male presents today for initial consultation for new diagnosis of skin cancer- Lower LEFT temple squamous cell carcinoma, well differentiated invasive referred by Dr. Lawrence     8/21/24 Lower LEFT temple shave biopsy revealed squamous cell carcinoma, well differentiated invasive the specimen grossly was irregular in shape measured 7 x 5 mm on the surface and 1mm deep.    He was recommended to undergo Mohs surgery which is scheduled for next month.  Patient follows up with Dr. Yap for BPH; stated on Tadalafil for erectile dysfunction.   9/20/24 Patient presents today to discuss the role of radiation therapy as an alternative to surgery. He is generally doing well.

## 2024-09-20 NOTE — PHYSICAL EXAM
[Normal] : oriented to person, place and time, the affect was normal, the mood was normal and not anxious [de-identified] : 2 cm  erythematous skin lesion on left temple with heaped up edges.  No palpable preauricular or postauricular adenopathy.

## 2024-09-20 NOTE — HISTORY OF PRESENT ILLNESS
[FreeTextEntry1] : 69 y/o male presents today for initial consultation for new diagnosis of skin cancer- Lower LEFT temple squamous cell carcinoma, well differentiated invasive referred by Dr. Lawrence     8/21/24 Lower LEFT temple shave biopsy revealed squamous cell carcinoma, well differentiated invasive the specimen grossly was irregular in shape measured 7 x 5 mm on the surface and 1mm deep.    He was recommended to undergo Mohs surgery which is scheduled for next month.  Patient follows up with Dr. Yap for BPH; stated on Tadalafil for erectile dysfunction.   9/20/24 Patient presents today to discuss the role of radiation therapy as an alternative to surgery. He is generally doing well.

## 2024-09-20 NOTE — PHYSICAL EXAM
[Normal] : oriented to person, place and time, the affect was normal, the mood was normal and not anxious [de-identified] : 2 cm  erythematous skin lesion on left temple with heaped up edges.  No palpable preauricular or postauricular adenopathy.

## 2024-09-23 PROCEDURE — 77334 RADIATION TREATMENT AID(S): CPT | Mod: 26

## 2024-09-23 PROCEDURE — 77290 THER RAD SIMULAJ FIELD CPLX: CPT | Mod: 26

## 2024-09-23 PROCEDURE — 77263 THER RADIOLOGY TX PLNG CPLX: CPT

## 2024-09-27 PROCEDURE — 77321 SPECIAL TELETX PORT PLAN: CPT

## 2024-09-27 PROCEDURE — 77334 RADIATION TREATMENT AID(S): CPT

## 2024-10-09 ENCOUNTER — NON-APPOINTMENT (OUTPATIENT)
Age: 69
End: 2024-10-09

## 2024-10-09 VITALS
HEIGHT: 72 IN | BODY MASS INDEX: 30.48 KG/M2 | RESPIRATION RATE: 16 BRPM | WEIGHT: 225 LBS | HEART RATE: 90 BPM | OXYGEN SATURATION: 97 %

## 2024-10-09 PROCEDURE — 77427 RADIATION TX MANAGEMENT X5: CPT

## 2024-10-09 PROCEDURE — G6012: CPT

## 2024-10-10 PROCEDURE — G6012: CPT

## 2024-10-11 PROCEDURE — G6012: CPT

## 2024-10-14 PROCEDURE — G6012: CPT

## 2024-10-15 ENCOUNTER — NON-APPOINTMENT (OUTPATIENT)
Age: 69
End: 2024-10-15

## 2024-10-15 VITALS
RESPIRATION RATE: 16 BRPM | BODY MASS INDEX: 30.2 KG/M2 | OXYGEN SATURATION: 97 % | WEIGHT: 223 LBS | HEART RATE: 89 BPM | HEIGHT: 72 IN

## 2024-10-15 PROCEDURE — G6012: CPT

## 2024-10-15 PROCEDURE — 77336 RADIATION PHYSICS CONSULT: CPT

## 2024-10-16 PROCEDURE — G6012: CPT

## 2024-10-16 PROCEDURE — 77427 RADIATION TX MANAGEMENT X5: CPT

## 2024-10-17 PROCEDURE — G6012: CPT

## 2024-10-18 PROCEDURE — G6012: CPT

## 2024-10-21 ENCOUNTER — NON-APPOINTMENT (OUTPATIENT)
Age: 69
End: 2024-10-21

## 2024-10-21 VITALS
HEART RATE: 78 BPM | HEIGHT: 72 IN | WEIGHT: 225 LBS | OXYGEN SATURATION: 96 % | BODY MASS INDEX: 30.48 KG/M2 | RESPIRATION RATE: 16 BRPM

## 2024-10-21 PROCEDURE — G6012: CPT

## 2024-10-22 PROCEDURE — G6012: CPT

## 2024-10-22 PROCEDURE — 77336 RADIATION PHYSICS CONSULT: CPT

## 2024-10-23 PROCEDURE — G6012: CPT

## 2024-10-23 PROCEDURE — 77427 RADIATION TX MANAGEMENT X5: CPT

## 2024-10-24 PROCEDURE — G6012: CPT

## 2024-10-25 PROBLEM — C44.329 SQUAMOUS CELL CARCINOMA OF SKIN OF LEFT TEMPLE: Status: ACTIVE | Noted: 2024-10-25

## 2024-10-25 PROCEDURE — G6012: CPT

## 2024-10-28 ENCOUNTER — NON-APPOINTMENT (OUTPATIENT)
Age: 69
End: 2024-10-28

## 2024-10-28 VITALS
OXYGEN SATURATION: 97 % | WEIGHT: 225 LBS | HEIGHT: 72 IN | HEART RATE: 86 BPM | BODY MASS INDEX: 30.48 KG/M2 | RESPIRATION RATE: 16 BRPM

## 2024-10-28 DIAGNOSIS — C44.329 SQUAMOUS CELL CARCINOMA OF SKIN OF OTHER PARTS OF FACE: ICD-10-CM

## 2024-10-28 PROCEDURE — G6012: CPT

## 2024-10-29 PROCEDURE — 77336 RADIATION PHYSICS CONSULT: CPT

## 2024-10-29 PROCEDURE — G6012: CPT

## 2024-10-30 PROCEDURE — G6012: CPT

## 2024-10-30 PROCEDURE — 77427 RADIATION TX MANAGEMENT X5: CPT

## 2024-10-31 PROCEDURE — G6012: CPT

## 2024-11-01 PROCEDURE — G6012: CPT

## 2024-11-04 ENCOUNTER — NON-APPOINTMENT (OUTPATIENT)
Age: 69
End: 2024-11-04

## 2024-11-04 PROCEDURE — G6012: CPT

## 2024-11-05 PROCEDURE — 77336 RADIATION PHYSICS CONSULT: CPT

## 2024-11-05 PROCEDURE — G6012: CPT

## 2024-12-04 ENCOUNTER — APPOINTMENT (OUTPATIENT)
Dept: RADIATION ONCOLOGY | Facility: CLINIC | Age: 69
End: 2024-12-04
Payer: COMMERCIAL

## 2024-12-04 VITALS
HEART RATE: 82 BPM | OXYGEN SATURATION: 94 % | DIASTOLIC BLOOD PRESSURE: 83 MMHG | RESPIRATION RATE: 18 BRPM | SYSTOLIC BLOOD PRESSURE: 123 MMHG

## 2024-12-04 PROCEDURE — 99024 POSTOP FOLLOW-UP VISIT: CPT

## 2024-12-20 NOTE — ED STATDOCS - CPE ED EYE NORM
Labs reviewed  Improved renal function however potassium unchanged with diet changes   Decrease spironolactone 12.5 mg daily   Follow up in CHF clinic as scheduled     Thank you  bilateral normal...

## 2025-01-24 NOTE — ASU PREOPERATIVE ASSESSMENT, ADULT ([facility] ONLY) - IS PATIENT PREGNANT?
Verbal given from Dr. Davey to test for flu and send a zpac. Patient states she will get tested over the counter. Medication sent.   not applicable (Male)

## 2025-02-24 ENCOUNTER — RESULT REVIEW (OUTPATIENT)
Age: 70
End: 2025-02-24

## 2025-02-24 ENCOUNTER — INPATIENT (INPATIENT)
Facility: HOSPITAL | Age: 70
LOS: 0 days | Discharge: HOME | DRG: 69 | End: 2025-02-25
Attending: INTERNAL MEDICINE | Admitting: STUDENT IN AN ORGANIZED HEALTH CARE EDUCATION/TRAINING PROGRAM
Payer: COMMERCIAL

## 2025-02-24 VITALS
RESPIRATION RATE: 19 BRPM | DIASTOLIC BLOOD PRESSURE: 96 MMHG | WEIGHT: 223.55 LBS | HEIGHT: 71 IN | SYSTOLIC BLOOD PRESSURE: 143 MMHG | TEMPERATURE: 98 F | OXYGEN SATURATION: 99 % | HEART RATE: 80 BPM

## 2025-02-24 DIAGNOSIS — Z98.890 OTHER SPECIFIED POSTPROCEDURAL STATES: Chronic | ICD-10-CM

## 2025-02-24 DIAGNOSIS — Z90.49 ACQUIRED ABSENCE OF OTHER SPECIFIED PARTS OF DIGESTIVE TRACT: Chronic | ICD-10-CM

## 2025-02-24 DIAGNOSIS — Z87.19 PERSONAL HISTORY OF OTHER DISEASES OF THE DIGESTIVE SYSTEM: Chronic | ICD-10-CM

## 2025-02-24 LAB
A1C WITH ESTIMATED AVERAGE GLUCOSE RESULT: 5.9 % — HIGH (ref 4–5.6)
ALBUMIN SERPL ELPH-MCNC: 4 G/DL — SIGNIFICANT CHANGE UP (ref 3.3–5)
ALP SERPL-CCNC: 82 U/L — SIGNIFICANT CHANGE UP (ref 40–120)
ALT FLD-CCNC: 50 U/L — SIGNIFICANT CHANGE UP (ref 12–78)
ANION GAP SERPL CALC-SCNC: 8 MMOL/L — SIGNIFICANT CHANGE UP (ref 5–17)
APTT BLD: 33.9 SEC — SIGNIFICANT CHANGE UP (ref 24.5–35.6)
AST SERPL-CCNC: 37 U/L — SIGNIFICANT CHANGE UP (ref 15–37)
BASOPHILS # BLD AUTO: 0.07 K/UL — SIGNIFICANT CHANGE UP (ref 0–0.2)
BASOPHILS NFR BLD AUTO: 0.6 % — SIGNIFICANT CHANGE UP (ref 0–2)
BILIRUB SERPL-MCNC: 0.9 MG/DL — SIGNIFICANT CHANGE UP (ref 0.2–1.2)
BUN SERPL-MCNC: 14 MG/DL — SIGNIFICANT CHANGE UP (ref 7–23)
CALCIUM SERPL-MCNC: 9.2 MG/DL — SIGNIFICANT CHANGE UP (ref 8.5–10.1)
CHLORIDE SERPL-SCNC: 108 MMOL/L — SIGNIFICANT CHANGE UP (ref 96–108)
CO2 SERPL-SCNC: 23 MMOL/L — SIGNIFICANT CHANGE UP (ref 22–31)
CREAT SERPL-MCNC: 1.09 MG/DL — SIGNIFICANT CHANGE UP (ref 0.5–1.3)
EGFR: 73 ML/MIN/1.73M2 — SIGNIFICANT CHANGE UP
EOSINOPHIL # BLD AUTO: 0.35 K/UL — SIGNIFICANT CHANGE UP (ref 0–0.5)
EOSINOPHIL NFR BLD AUTO: 3.2 % — SIGNIFICANT CHANGE UP (ref 0–6)
ESTIMATED AVERAGE GLUCOSE: 123 MG/DL — HIGH (ref 68–114)
GLUCOSE SERPL-MCNC: 137 MG/DL — HIGH (ref 70–99)
HCT VFR BLD CALC: 50 % — SIGNIFICANT CHANGE UP (ref 39–50)
HGB BLD-MCNC: 17.6 G/DL — HIGH (ref 13–17)
IMM GRANULOCYTES # BLD AUTO: 0.04 K/UL — SIGNIFICANT CHANGE UP (ref 0–0.07)
IMM GRANULOCYTES NFR BLD AUTO: 0.4 % — SIGNIFICANT CHANGE UP (ref 0–0.9)
INR BLD: 1.07 RATIO — SIGNIFICANT CHANGE UP (ref 0.85–1.16)
LYMPHOCYTES # BLD AUTO: 2.59 K/UL — SIGNIFICANT CHANGE UP (ref 1–3.3)
LYMPHOCYTES NFR BLD AUTO: 23.7 % — SIGNIFICANT CHANGE UP (ref 13–44)
MAGNESIUM SERPL-MCNC: 2.3 MG/DL — SIGNIFICANT CHANGE UP (ref 1.6–2.6)
MCHC RBC-ENTMCNC: 30 PG — SIGNIFICANT CHANGE UP (ref 27–34)
MCHC RBC-ENTMCNC: 35.2 G/DL — SIGNIFICANT CHANGE UP (ref 32–36)
MCV RBC AUTO: 85.2 FL — SIGNIFICANT CHANGE UP (ref 80–100)
MONOCYTES # BLD AUTO: 1.08 K/UL — HIGH (ref 0–0.9)
MONOCYTES NFR BLD AUTO: 9.9 % — SIGNIFICANT CHANGE UP (ref 2–14)
NEUTROPHILS # BLD AUTO: 6.79 K/UL — SIGNIFICANT CHANGE UP (ref 1.8–7.4)
NEUTROPHILS NFR BLD AUTO: 62.2 % — SIGNIFICANT CHANGE UP (ref 43–77)
NRBC # BLD AUTO: 0 K/UL — SIGNIFICANT CHANGE UP (ref 0–0)
NRBC # FLD: 0 K/UL — SIGNIFICANT CHANGE UP (ref 0–0)
NRBC BLD AUTO-RTO: 0 /100 WBCS — SIGNIFICANT CHANGE UP (ref 0–0)
PLATELET # BLD AUTO: 214 K/UL — SIGNIFICANT CHANGE UP (ref 150–400)
PMV BLD: 9.5 FL — SIGNIFICANT CHANGE UP (ref 7–13)
POTASSIUM SERPL-MCNC: 3.9 MMOL/L — SIGNIFICANT CHANGE UP (ref 3.5–5.3)
POTASSIUM SERPL-SCNC: 3.9 MMOL/L — SIGNIFICANT CHANGE UP (ref 3.5–5.3)
PROT SERPL-MCNC: 7.8 GM/DL — SIGNIFICANT CHANGE UP (ref 6–8.3)
PROTHROM AB SERPL-ACNC: 12.6 SEC — SIGNIFICANT CHANGE UP (ref 9.9–13.4)
RBC # BLD: 5.87 M/UL — HIGH (ref 4.2–5.8)
RBC # FLD: 13.2 % — SIGNIFICANT CHANGE UP (ref 10.3–14.5)
SODIUM SERPL-SCNC: 139 MMOL/L — SIGNIFICANT CHANGE UP (ref 135–145)
TROPONIN I, HIGH SENSITIVITY RESULT: 23.6 NG/L — SIGNIFICANT CHANGE UP
WBC # BLD: 10.92 K/UL — HIGH (ref 3.8–10.5)
WBC # FLD AUTO: 10.92 K/UL — HIGH (ref 3.8–10.5)

## 2025-02-24 PROCEDURE — 99285 EMERGENCY DEPT VISIT HI MDM: CPT

## 2025-02-24 PROCEDURE — 36415 COLL VENOUS BLD VENIPUNCTURE: CPT

## 2025-02-24 PROCEDURE — 99223 1ST HOSP IP/OBS HIGH 75: CPT

## 2025-02-24 PROCEDURE — 71045 X-RAY EXAM CHEST 1 VIEW: CPT | Mod: 26

## 2025-02-24 PROCEDURE — 70496 CT ANGIOGRAPHY HEAD: CPT | Mod: 26

## 2025-02-24 PROCEDURE — 97116 GAIT TRAINING THERAPY: CPT | Mod: GP

## 2025-02-24 PROCEDURE — 70551 MRI BRAIN STEM W/O DYE: CPT | Mod: 26

## 2025-02-24 PROCEDURE — 99223 1ST HOSP IP/OBS HIGH 75: CPT | Mod: FS

## 2025-02-24 PROCEDURE — 70498 CT ANGIOGRAPHY NECK: CPT | Mod: 26

## 2025-02-24 PROCEDURE — 85027 COMPLETE CBC AUTOMATED: CPT

## 2025-02-24 PROCEDURE — 83036 HEMOGLOBIN GLYCOSYLATED A1C: CPT

## 2025-02-24 PROCEDURE — 70551 MRI BRAIN STEM W/O DYE: CPT | Mod: MC

## 2025-02-24 PROCEDURE — 97162 PT EVAL MOD COMPLEX 30 MIN: CPT | Mod: GP

## 2025-02-24 PROCEDURE — 80053 COMPREHEN METABOLIC PANEL: CPT

## 2025-02-24 PROCEDURE — 80061 LIPID PANEL: CPT

## 2025-02-24 PROCEDURE — 93306 TTE W/DOPPLER COMPLETE: CPT | Mod: 26

## 2025-02-24 RX ORDER — CLOPIDOGREL BISULFATE 75 MG/1
75 TABLET, FILM COATED ORAL DAILY
Refills: 0 | Status: DISCONTINUED | OUTPATIENT
Start: 2025-02-24 | End: 2025-02-25

## 2025-02-24 RX ORDER — LOSARTAN POTASSIUM 100 MG/1
1 TABLET, FILM COATED ORAL
Refills: 0 | DISCHARGE

## 2025-02-24 RX ORDER — ACETAMINOPHEN 500 MG/5ML
650 LIQUID (ML) ORAL EVERY 6 HOURS
Refills: 0 | Status: DISCONTINUED | OUTPATIENT
Start: 2025-02-24 | End: 2025-02-25

## 2025-02-24 RX ORDER — ONDANSETRON HCL/PF 4 MG/2 ML
4 VIAL (ML) INJECTION EVERY 6 HOURS
Refills: 0 | Status: DISCONTINUED | OUTPATIENT
Start: 2025-02-24 | End: 2025-02-25

## 2025-02-24 RX ORDER — MAGNESIUM, ALUMINUM HYDROXIDE 200-200 MG
30 TABLET,CHEWABLE ORAL EVERY 4 HOURS
Refills: 0 | Status: DISCONTINUED | OUTPATIENT
Start: 2025-02-24 | End: 2025-02-25

## 2025-02-24 RX ORDER — HEPARIN SODIUM 1000 [USP'U]/ML
5000 INJECTION INTRAVENOUS; SUBCUTANEOUS EVERY 12 HOURS
Refills: 0 | Status: DISCONTINUED | OUTPATIENT
Start: 2025-02-24 | End: 2025-02-25

## 2025-02-24 RX ORDER — ASPIRIN 325 MG
243 TABLET ORAL ONCE
Refills: 0 | Status: COMPLETED | OUTPATIENT
Start: 2025-02-24 | End: 2025-02-24

## 2025-02-24 RX ORDER — AMLODIPINE BESYLATE 10 MG/1
10 TABLET ORAL DAILY
Refills: 0 | Status: DISCONTINUED | OUTPATIENT
Start: 2025-02-24 | End: 2025-02-24

## 2025-02-24 RX ORDER — ATORVASTATIN CALCIUM 80 MG/1
80 TABLET, FILM COATED ORAL AT BEDTIME
Refills: 0 | Status: DISCONTINUED | OUTPATIENT
Start: 2025-02-24 | End: 2025-02-25

## 2025-02-24 RX ORDER — METOPROLOL SUCCINATE 50 MG/1
100 TABLET, EXTENDED RELEASE ORAL
Refills: 0 | Status: DISCONTINUED | OUTPATIENT
Start: 2025-02-24 | End: 2025-02-25

## 2025-02-24 RX ORDER — ATORVASTATIN CALCIUM 80 MG/1
10 TABLET, FILM COATED ORAL AT BEDTIME
Refills: 0 | Status: DISCONTINUED | OUTPATIENT
Start: 2025-02-24 | End: 2025-02-24

## 2025-02-24 RX ORDER — MELATONIN 5 MG
3 TABLET ORAL AT BEDTIME
Refills: 0 | Status: DISCONTINUED | OUTPATIENT
Start: 2025-02-24 | End: 2025-02-25

## 2025-02-24 RX ORDER — ASPIRIN 325 MG
81 TABLET ORAL DAILY
Refills: 0 | Status: DISCONTINUED | OUTPATIENT
Start: 2025-02-25 | End: 2025-02-25

## 2025-02-24 RX ADMIN — HEPARIN SODIUM 5000 UNIT(S): 1000 INJECTION INTRAVENOUS; SUBCUTANEOUS at 10:18

## 2025-02-24 RX ADMIN — METOPROLOL SUCCINATE 100 MILLIGRAM(S): 50 TABLET, EXTENDED RELEASE ORAL at 21:45

## 2025-02-24 RX ADMIN — HEPARIN SODIUM 5000 UNIT(S): 1000 INJECTION INTRAVENOUS; SUBCUTANEOUS at 21:45

## 2025-02-24 RX ADMIN — ATORVASTATIN CALCIUM 80 MILLIGRAM(S): 80 TABLET, FILM COATED ORAL at 21:45

## 2025-02-24 RX ADMIN — CLOPIDOGREL BISULFATE 75 MILLIGRAM(S): 75 TABLET, FILM COATED ORAL at 16:31

## 2025-02-24 RX ADMIN — Medication 243 MILLIGRAM(S): at 06:53

## 2025-02-24 NOTE — PATIENT PROFILE ADULT - FALL HARM RISK - HARM RISK INTERVENTIONS

## 2025-02-24 NOTE — ED PROVIDER NOTE - OBJECTIVE STATEMENT
69-year-old male with history of hypertension, diverticulitis, CAD, BPH, TIA, cholecystectomy presents for evaluation of numbness and tingling at the right corner of the mouth and at the right index finger that started approximately 9 to 10 hours ago.  Patient denies any slurred speech or weakness.  Patient denies any ataxia and was able to drive here and ambulate from the car into the emergency department.  Patient notes that his symptoms during the prior TIA were more severe and involve numbness and tingling of the entire right side of the face.  Patient's triage NIH stroke scale is 0.  Patient states that he also has intermittent dizziness described as a lightheadedness vertigo.  Patient denies any chest pain or shortness of breath.  Based on the patient's exam NIH stroke scale of 0, code stroke was not called with patient send for full evaluation to include cardiac workup which includes EKG, CBC, CMP, troponin will get CTA of the head and neck with IV contrast and consult with neurology.

## 2025-02-24 NOTE — CONSULT NOTE ADULT - SUBJECTIVE AND OBJECTIVE BOX
R facial and hand numbness    HPI: 69-year-old male with history of hypertension, CAD, acute lacunar L thalamocapsular infarct 23,  presents for evaluation of numbness and tingling at the right corner of the mouth and at the right index finger that started approximately 9 to 10 hours ago.  Patient denies any slurred speech or weakness.  Patient denies any ataxia and was able to drive here and ambulate from the car into the emergency department.  Patient notes that his symptoms during the prior TIA were more severe and involve numbness and tingling of the entire right side of the face.  Patient's triage NIH stroke scale was 0.  Patient states that he also has intermittent dizziness described as a lightheadedness vertigo.  Patient denies any chest pain or shortness of breath.  Based on the patient's exam NIH stroke scale of 0, code stroke was not called.  CTA head and neck negative pt admitted for neurology consult and further work up.                               17.6   10.92 )-----------( 214      ( 2025 05:40 )             50.0     -    139  |  108  |  14  ----------------------------<  137[H]  3.9   |  23  |  1.09    Ca    9.2      2025 05:40  Mg     2.3     -    TPro  7.8  /  Alb  4.0  /  TBili  0.9  /  DBili  x   /  AST  37  /  ALT  50  /  AlkPhos  82  02-24    CAPILLARY BLOOD GLUCOSE        PT/INR - ( 2025 05:40 )   PT: 12.6 sec;   INR: 1.07 ratio         PTT - ( 2025 05:40 )  PTT:33.9 sec  Urinalysis Basic - ( 2025 05:40 )    Color: x / Appearance: x / SG: x / pH: x  Gluc: 137 mg/dL / Ketone: x  / Bili: x / Urobili: x   Blood: x / Protein: x / Nitrite: x   Leuk Esterase: x / RBC: x / WBC x   Sq Epi: x / Non Sq Epi: x / Bacteria: x          Radiology/Imaging, I have personally reviewed:    CTA head and neck   IMPRESSION:    CT HEAD:  No acute intracranial hemorrhage, mass effect, or midline shift.    CTA NECK:  No evidence of significant stenosis or occlusion.    CTA HEAD:  No large vessel occlusion, significant stenosis or vascular abnormality   identified.      EKG sinus rhythmn w/ frequent pvc rsr' pattern suggest right ventricular conduction delay   qtc 457        (2025 08:04)      PAST MEDICAL & SURGICAL HISTORY:  HTN (hypertension)      Diverticulitis      Hypertension      Diverticulosis      BPH (benign prostatic hyperplasia)      Other obstructive and reflux uropathy      CAD (coronary artery disease)      History of cholecystectomy      S/P partial colectomy  for diverticulitis      History of inguinal hernia repair      History of cholecystectomy      History of diverticulitis  resection 2012      History of prostate surgery          FAMILY HISTORY:  Family history of cardiac disorder in father (Father)   age 58    Family history of diabetes mellitus in father (Father)        Social Hx:  Nonsmoker, no drug or alcohol use    MEDICATIONS  (STANDING):  amLODIPine   Tablet 10 milliGRAM(s) Oral daily  atorvastatin 10 milliGRAM(s) Oral at bedtime  heparin   Injectable 5000 Unit(s) SubCutaneous every 12 hours  metoprolol tartrate 100 milliGRAM(s) Oral two times a day       Allergies  No Known Allergies        ROS: Pertinent positives in HPI, all other ROS were reviewed and are negative.      Vital Signs Last 24 Hrs  T(C): 36.2 (2025 08:09), Max: 36.6 (2025 06:47)  T(F): 97.1 (2025 08:09), Max: 97.8 (2025 06:47)  HR: 74 (2025 10:18) (67 - 80)  BP: 116/89 (2025 10:18) (113/75 - 143/96)  BP(mean): 88 (2025 08:09) (88 - 113)  RR: 18 (2025 10:18) (14 - 19)  SpO2: 96% (2025 10:18) (96% - 99%)    Parameters below as of 2025 10:18  Patient On (Oxygen Delivery Method): room air            Constitutional: awake, NAD  HEAD:  Normocephalic  Neck: Supple.  Extremities:  no edema  Musculoskeletal: no joint swelling/tenderness, no abnormal movements  Skin: No rashes    Neurological exam:  HF: A x O x 3. Appropriately interactive, normal affect. Speech fluent, No Aphasia or paraphasic errors. Naming /repetition intact   CN: RICHELLE, EOMI, VFF, facial sensation normal, no NLFD, tongue midline, Palate moves equally, SCM equal bilaterally  Motor: No pronator drift, Strength 5/5 in all 4 ext, normal bulk and tone, no tremors  Sens: Intact to light touch     Reflexes: Symmetric and normal,  downgoing toes b/l  Coord:  No FNFA, dysmetria, JAME intact   Gait/Balance: Normal/Cannot test    NIHSS:          Labs:       139  |  108  |  14  ----------------------------<  137[H]  3.9   |  23  |  1.09    Ca    9.2      2025 05:40  Mg     2.3         TPro  7.8  /  Alb  4.0  /  TBili  0.9  /  DBili  x   /  AST  37  /  ALT  50  /  AlkPhos  82                                17.6   10.92 )-----------( 214      ( 2025 05:40 )             50.0       Radiology:    < from: CT Angio Neck w/ IV Cont (25 @ 06:23) >  IMPRESSION:    CT HEAD:  No acute intracranial hemorrhage, mass effect, or midline shift.    CTA NECK:  No evidence of significant stenosis or occlusion.    CTA HEAD:  No large vessel occlusion, significant stenosis or vascular abnormality   identified.       R facial and hand numbness    HPI: 69-year-old male with history of hypertension, CAD, acute lacunar L thalamocapsular infarct 23,  presents for evaluation of numbness and tingling at the right corner of the mouth and at the right index finger that started approximately 9 to 10 hours ago.  Patient denies any slurred speech or weakness.  Patient denies any ataxia and was able to drive here and ambulate from the car into the emergency department.  Patient notes that his symptoms during the prior TIA were more severe and involve numbness and tingling of the entire right side of the face.  Patient's triage NIH stroke scale was 0.  Patient states that he also has intermittent dizziness described as a lightheadedness vertigo.  Patient denies any chest pain or shortness of breath.  Based on the patient's exam NIH stroke scale of 0, code stroke was not called.  CTA head and neck negative pt admitted for neurology consult and further work up.                               17.6   10.92 )-----------( 214      ( 2025 05:40 )             50.0     -    139  |  108  |  14  ----------------------------<  137[H]  3.9   |  23  |  1.09    Ca    9.2      2025 05:40  Mg     2.3     -    TPro  7.8  /  Alb  4.0  /  TBili  0.9  /  DBili  x   /  AST  37  /  ALT  50  /  AlkPhos  82  02-24    CAPILLARY BLOOD GLUCOSE        PT/INR - ( 2025 05:40 )   PT: 12.6 sec;   INR: 1.07 ratio         PTT - ( 2025 05:40 )  PTT:33.9 sec  Urinalysis Basic - ( 2025 05:40 )    Color: x / Appearance: x / SG: x / pH: x  Gluc: 137 mg/dL / Ketone: x  / Bili: x / Urobili: x   Blood: x / Protein: x / Nitrite: x   Leuk Esterase: x / RBC: x / WBC x   Sq Epi: x / Non Sq Epi: x / Bacteria: x          Radiology/Imaging, I have personally reviewed:    CTA head and neck   IMPRESSION:    CT HEAD:  No acute intracranial hemorrhage, mass effect, or midline shift.    CTA NECK:  No evidence of significant stenosis or occlusion.    CTA HEAD:  No large vessel occlusion, significant stenosis or vascular abnormality   identified.      EKG sinus rhythmn w/ frequent pvc rsr' pattern suggest right ventricular conduction delay   qtc 457        (2025 08:04)      PAST MEDICAL & SURGICAL HISTORY:  HTN (hypertension)      Diverticulitis      Hypertension      Diverticulosis      BPH (benign prostatic hyperplasia)      Other obstructive and reflux uropathy      CAD (coronary artery disease)      History of cholecystectomy      S/P partial colectomy  for diverticulitis      History of inguinal hernia repair      History of cholecystectomy      History of diverticulitis  resection 2012      History of prostate surgery          FAMILY HISTORY:  Family history of cardiac disorder in father (Father)   age 58    Family history of diabetes mellitus in father (Father)        Social Hx:  Nonsmoker, no drug or alcohol use    MEDICATIONS  (STANDING):  amLODIPine   Tablet 10 milliGRAM(s) Oral daily  atorvastatin 10 milliGRAM(s) Oral at bedtime  heparin   Injectable 5000 Unit(s) SubCutaneous every 12 hours  metoprolol tartrate 100 milliGRAM(s) Oral two times a day       Allergies  No Known Allergies        ROS: Pertinent positives in HPI, all other ROS were reviewed and are negative.      Vital Signs Last 24 Hrs  T(C): 36.2 (2025 08:09), Max: 36.6 (2025 06:47)  T(F): 97.1 (2025 08:09), Max: 97.8 (2025 06:47)  HR: 74 (2025 10:18) (67 - 80)  BP: 116/89 (2025 10:18) (113/75 - 143/96)  BP(mean): 88 (2025 08:09) (88 - 113)  RR: 18 (2025 10:18) (14 - 19)  SpO2: 96% (2025 10:18) (96% - 99%)    Parameters below as of 2025 10:18  Patient On (Oxygen Delivery Method): room air      GEN  Constitutional: awake, NAD  HEAD:  Normocephalic  Neck: Supple.  Extremities:  no edema  Musculoskeletal: no joint swelling/tenderness, no abnormal movements  Skin: No rashes    Neurological exam:  HF: A x O x 3. Appropriately interactive, normal affect. Speech fluent, No Aphasia or paraphasic errors. Naming /repetition intact   CN: RICHELLE, EOMI, VFF, facial sensation normal, no NLFD, tongue midline, Palate moves equally, SCM equal bilaterally  Motor: No pronator drift, Strength 5/5 in all 4 ext, normal bulk and tone, no tremors  Sens: Intact to light touch     Reflexes: Symmetric and normal,  downgoing toes b/l  Coord:  No FNFA, dysmetria, JAME intact   Gait/Balance: Normal/Cannot test    NIHSS:          Labs:       139  |  108  |  14  ----------------------------<  137[H]  3.9   |  23  |  1.09    Ca    9.2      2025 05:40  Mg     2.3         TPro  7.8  /  Alb  4.0  /  TBili  0.9  /  DBili  x   /  AST  37  /  ALT  50  /  AlkPhos  82                                17.6   10.92 )-----------( 214      ( 2025 05:40 )             50.0       Radiology:    < from: CT Angio Neck w/ IV Cont (25 @ 06:23) >  IMPRESSION:    CT HEAD:  No acute intracranial hemorrhage, mass effect, or midline shift.    CTA NECK:  No evidence of significant stenosis or occlusion.    CTA HEAD:  No large vessel occlusion, significant stenosis or vascular abnormality   identified.       R facial and hand numbness    HPI: 69-year-old male with history of hypertension, CAD, acute lacunar L thalamo capsular infarct 23,  presents for evaluation of numbness and tingling at the right corner of the mouth and at the right index finger that started approximately 5 pm last night.  Patient denies any dysarthria, facial droop, diplopia, focal weakness, unsteady gait, dizziness, HA.  Patient drove himself to the emergency department this morning because his sx's were still present.  Patient notes that his symptoms during the prior TIA were more severe and involve numbness and tingling of the entire right side of the face.  Patient's triage NIH stroke scale was 0, and code stroke was not called.  CTA head and neck was negative for acute findings.  Neurology consulted for possible stroke w/u.                  PAST MEDICAL & SURGICAL HISTORY:  HTN (hypertension)    CVA      Diverticulitis      Hypertension      Diverticulosis      BPH (benign prostatic hyperplasia)      Other obstructive and reflux uropathy      CAD (coronary artery disease)      History of cholecystectomy      S/P partial colectomy  for diverticulitis      History of inguinal hernia repair      History of cholecystectomy      History of diverticulitis  resection       History of prostate surgery          FAMILY HISTORY:  Family history of cardiac disorder in father (Father)   age 58    Family history of diabetes mellitus in father (Father)        Social Hx:  Nonsmoker, no drug or alcohol use    MEDICATIONS  (STANDING):  amLODIPine   Tablet 10 milliGRAM(s) Oral daily  atorvastatin 10 milliGRAM(s) Oral at bedtime  heparin   Injectable 5000 Unit(s) SubCutaneous every 12 hours  metoprolol tartrate 100 milliGRAM(s) Oral two times a day       Allergies  No Known Allergies        ROS: Pertinent positives in HPI, all other ROS were reviewed and are negative.      Vital Signs Last 24 Hrs  T(C): 36.2 (2025 08:09), Max: 36.6 (2025 06:47)  T(F): 97.1 (2025 08:09), Max: 97.8 (2025 06:47)  HR: 74 (2025 10:18) (67 - 80)  BP: 116/89 (2025 10:18) (113/75 - 143/96)  BP(mean): 88 (2025 08:09) (88 - 113)  RR: 18 (2025 10:18) (14 - 19)  SpO2: 96% (2025 10:18) (96% - 99%)    Parameters below as of 2025 10:18  Patient On (Oxygen Delivery Method): room air      GEN  Constitutional: awake, NAD  HEAD:  Normocephalic  Neck: Supple.  Extremities:  no edema  Musculoskeletal: no joint swelling/tenderness, no abnormal movements  Skin: No rashes    Neurological exam:  HF: A x O x 3. Appropriately interactive.  Speech fluent, No Aphasia or paraphasic errors. Naming /repetition intact   CN: RICHELLE, EOMI, VFF, facial sensation normal, no NLFD, tongue midline, Palate moves equally  Motor: No pronator drift, Strength 5/5 in all 4 ext, normal bulk and tone, no tremors  Sens: Intact to light touch     Reflexes: Symmetric and normal,  downgoing toes b/l  Coord:  No FNFA, dysmetria, JAME intact   Gait/Balance: Normal    NIHSS: 0          Labs:       139  |  108  |  14  ----------------------------<  137[H]  3.9   |  23  |  1.09    Ca    9.2      2025 05:40  Mg     2.3         TPro  7.8  /  Alb  4.0  /  TBili  0.9  /  DBili  x   /  AST  37  /  ALT  50  /  AlkPhos  82                                17.6   10.92 )-----------( 214      ( 2025 05:40 )             50.0       Radiology:    < from: CT Angio Neck w/ IV Cont (25 @ 06:23) >  IMPRESSION:    CT HEAD:  No acute intracranial hemorrhage, mass effect, or midline shift.    CTA NECK:  No evidence of significant stenosis or occlusion.    CTA HEAD:  No large vessel occlusion, significant stenosis or vascular abnormality   identified.    < from: TTE W or WO Ultrasound Enhancing Agent (25 @ 10:49) >  CONCLUSIONS:      1. Left ventricular systolic function is normal with an ejection fraction of 53 % by Martinez's method of disks.   2. Normal right ventricular systolic function.   3. Mild mitral regurgitation.   4. Agitated saline injection was negative for intracardiac shunt.         R facial and hand numbness    HPI: 69-year-old male with history of hypertension, CAD, acute lacunar L thalamo capsular infarct 23,  presents for evaluation of numbness and tingling at the right corner of the mouth and at the right index finger that started approximately 5 pm last night.  Patient denies any dysarthria, facial droop, diplopia, focal weakness, unsteady gait, dizziness, HA.  Patient drove himself to the emergency department this morning because his sx's were still present.  Patient notes that his symptoms during the prior TIA were more severe and involve numbness and tingling of the entire right side of the face.  Patient's triage NIH stroke scale was 0, and code stroke was not called.  CTA head and neck was negative for acute findings.  Neurology consulted for possible stroke w/u.              PAST MEDICAL & SURGICAL HISTORY:  HTN (hypertension)    CVA    Diverticulitis    Hypertension    Diverticulosis    BPH (benign prostatic hyperplasia)    Other obstructive and reflux uropathy    CAD (coronary artery disease)    History of cholecystectomy    S/P partial colectomy  for diverticulitis    History of inguinal hernia repair    History of cholecystectomy    History of diverticulitis  resection     History of prostate surgery          FAMILY HISTORY:  Family history of cardiac disorder in father (Father)   age 58    Family history of diabetes mellitus in father (Father)        Social Hx:  Nonsmoker, no drug or alcohol use    MEDICATIONS  (STANDING):  amLODIPine   Tablet 10 milliGRAM(s) Oral daily  atorvastatin 10 milliGRAM(s) Oral at bedtime  heparin   Injectable 5000 Unit(s) SubCutaneous every 12 hours  metoprolol tartrate 100 milliGRAM(s) Oral two times a day       Allergies  No Known Allergies        ROS: Pertinent positives in HPI, all other ROS were reviewed and are negative.      Vital Signs Last 24 Hrs  T(C): 36.2 (2025 08:09), Max: 36.6 (2025 06:47)  T(F): 97.1 (2025 08:09), Max: 97.8 (2025 06:47)  HR: 74 (2025 10:18) (67 - 80)  BP: 116/89 (2025 10:18) (113/75 - 143/96)  BP(mean): 88 (2025 08:09) (88 - 113)  RR: 18 (2025 10:18) (14 - 19)  SpO2: 96% (2025 10:18) (96% - 99%)    Parameters below as of 2025 10:18  Patient On (Oxygen Delivery Method): room air      GEN  Constitutional: awake, NAD  HEAD:  Normocephalic  Neck: Supple.  Extremities:  no edema  Musculoskeletal: no joint swelling/tenderness, no abnormal movements  Skin: No rashes    Neurological exam:  HF: A x O x 3. Appropriately interactive.  Speech fluent, No Aphasia or paraphasic errors. Naming /repetition intact   CN: RICHELLE, EOMI, VFF, facial sensation normal, no NLFD, tongue midline, Palate moves equally  Motor: No pronator drift, Strength 5/5 in all 4 ext, normal bulk and tone, no tremors  Sens: Intact to light touch     Reflexes: Symmetric and normal,  downgoing toes b/l  Coord:  No FNFA, dysmetria, JAME intact   Gait/Balance: Normal    NIHSS: 0          Labs:       139  |  108  |  14  ----------------------------<  137[H]  3.9   |  23  |  1.09    Ca    9.2      2025 05:40  Mg     2.3         TPro  7.8  /  Alb  4.0  /  TBili  0.9  /  DBili  x   /  AST  37  /  ALT  50  /  AlkPhos  82                                17.6   10.92 )-----------( 214      ( 2025 05:40 )             50.0       Radiology:    < from: CT Angio Neck w/ IV Cont (25 @ 06:23) >  IMPRESSION:    CT HEAD:  No acute intracranial hemorrhage, mass effect, or midline shift.    CTA NECK:  No evidence of significant stenosis or occlusion.    CTA HEAD:  No large vessel occlusion, significant stenosis or vascular abnormality   identified.    < from: TTE W or WO Ultrasound Enhancing Agent (25 @ 10:49) >  CONCLUSIONS:      1. Left ventricular systolic function is normal with an ejection fraction of 53 % by Martinez's method of disks.   2. Normal right ventricular systolic function.   3. Mild mitral regurgitation.   4. Agitated saline injection was negative for intracardiac shunt.

## 2025-02-24 NOTE — CONSULT NOTE ADULT - NS ATTEND AMEND GEN_ALL_CORE FT
Patient with symptom onset on 2/23. Now improved but not completely resolved.  He had the same symptoms in 2023.  Awaiting MRI brain to determine if this is a new stroke vs recrudescence of old symptoms.   Currently on aspirin. If MRI brain is positive, will add Plavix, with DAPT x 21 days followed by Plavix monotherapy.  Continue statin.  Will f/u

## 2025-02-24 NOTE — H&P ADULT - HISTORY OF PRESENT ILLNESS
HPI:69-year-old male with history of hypertension, diverticulitis, CAD, BPH, TIA, cholecystectomy presents for evaluation of numbness and tingling at the right corner of the mouth and at the right index finger that started approximately 9 to 10 hours ago.  Patient denies any slurred speech or weakness.  Patient denies any ataxia and was able to drive here and ambulate from the car into the emergency department.  Patient notes that his symptoms during the prior TIA were more severe and involve numbness and tingling of the entire right side of the face.  Patient's triage NIH stroke scale is 0.  Patient states that he also has intermittent dizziness described as a lightheadedness vertigo.  Patient denies any chest pain or shortness of breath.  Based on the patient's exam NIH stroke scale of 0, code stroke was not called. CTA head and neck negative pt admitted for neurology consult and further work up.     PAST MEDICAL & SURGICAL HISTORY:  HTN (hypertension)  Diverticulitis  Hypertension  Diverticulosis  BPH (benign prostatic hyperplasia)  Other obstructive and reflux uropathy  CAD (coronary artery disease)  History of cholecystectomy  S/P partial colectomy  for diverticulitis  History of inguinal hernia repair  History of cholecystectomy  History of diverticulitis  resection 2012  History of prostate surgery      FAMILY HISTORY:  Family history of cardiac disorder in father (Father)   age 58    Family history of diabetes mellitus in father (Father)      Social History:      Allergies    No Known Allergies    Intolerances        MEDICATIONS  (STANDING):    MEDICATIONS  (PRN):  acetaminophen     Tablet .. 650 milliGRAM(s) Oral every 6 hours PRN Mild Pain (1 - 3)  aluminum hydroxide/magnesium hydroxide/simethicone Suspension 30 milliLiter(s) Oral every 4 hours PRN Dyspepsia  melatonin 3 milliGRAM(s) Oral at bedtime PRN Insomnia  ondansetron Injectable 4 milliGRAM(s) IV Push every 6 hours PRN Nausea and/or Vomiting      ROS:  General:  No fevers, chills, or unexplained weight loss  Skin: No rash or bothersome skin lesions  Musculoskeletal: No arthalgias, myalgias or joint swelling  Eyes: No visual changes or eye pain  Ears: No hearing loss , otorrhea or ear pain  Nose, Mouth, Throat: No nasal congestion, rhinorrhea, oral lesions, postnasal drip or sore throat  Cardio: No chest pain or palpitations. no lower extremity edema. no syncope. no claudication.   Respiratory: No cough, shortness of breath or wheezing   GI: No diarrhea, constipation, blood in stools, abdominal pain, vomiting or heartburn  : No urinary frequency, hematuria, incontinence, or dysuria  Neurologic: No headaches, parasthesias, confusion, dysarthria or gait instability  Psychiatric:  No anxiety or depression  Lymphatic:  No easy bruising, easy bleeding or swollen glands  Allergic: No itching, sneezing , watery eyes, clear rhinorrhea or recurrent infections    PEx  T(C): 36.6 (25 @ 06:47), Max: 36.6 (25 @ 06:47)  HR: 72 (25 @ 06:47) (72 - 80)  BP: 120/86 (25 @ 06:47) (120/86 - 143/96)  RR: 14 (25 @ 06:47) (14 - 19)  SpO2: 98% (25 @ 06:47) (98% - 99%)  Wt(kg): --  General:     no acute distress, no identifying marks , scars, or tattoos.  Skin: no rash or prominent lesions  Head: normocephalic, atraumatic     Sinuses: non-tender  Nose: no external lesions, mucosa non-inflamed, septum and turbinates normal  Throat: no erythema, exudates or lesions.  Neck: Supple without lymphadenopathy. Thyroid no thyromegaly, no palpable thyroid nodules, no palpable nodules or masses, carotid arteries no bruits.   Breasts: No palpable masses or lesions.  Heart: RRR, no murmur or gallop.  Normal S1, S2.  No S3, S4.   Lungs: CTA bilaterally, no wheezes, rhonchi, rales.  Breathing unlabored.   Chest wall: Normal insp   Abdomen:  Soft, NT/ND, normal bowel sounds, no HSM, no masses.  No peritoneal signs.   Back: spine normal without deformity or tenderness.  Normal ROM   : Exam normal.  no inguinal hernias.  Extremities: No deformities, clubbing, cyanosis, or edema.  Musculoskeletal: Normal gait and station. No decreased range of motion, instability, atrophy or abnormal strength or tone in the head, neck, spine, ribs, pelvis or extremities.   Neurologic: CN 2-12 normal. Sensation to pain, touch and proprioception normal. DTRs normal in upper and lower extremities. No pathologic reflexes.  Motor normal.  Psychiatric: Oriented X3, intact recent and remote memory, judgement and insight, normal mood and affect.                          17.6   10.92 )-----------( 214      ( 2025 05:40 )             50.0     -    139  |  108  |  14  ----------------------------<  137[H]  3.9   |  23  |  1.09    Ca    9.2      2025 05:40  Mg     2.3         TPro  7.8  /  Alb  4.0  /  TBili  0.9  /  DBili  x   /  AST  37  /  ALT  50  /  AlkPhos  82      CAPILLARY BLOOD GLUCOSE        PT/INR - ( 2025 05:40 )   PT: 12.6 sec;   INR: 1.07 ratio         PTT - ( 2025 05:40 )  PTT:33.9 sec  Urinalysis Basic - ( 2025 05:40 )    Color: x / Appearance: x / SG: x / pH: x  Gluc: 137 mg/dL / Ketone: x  / Bili: x / Urobili: x   Blood: x / Protein: x / Nitrite: x   Leuk Esterase: x / RBC: x / WBC x   Sq Epi: x / Non Sq Epi: x / Bacteria: x          Radiology/Imaging, I have personally reviewed:    CTA head and neck   IMPRESSION:    CT HEAD:  No acute intracranial hemorrhage, mass effect, or midline shift.    CTA NECK:  No evidence of significant stenosis or occlusion.    CTA HEAD:  No large vessel occlusion, significant stenosis or vascular abnormality   identified.      EKG sinus rhythmn w/ frequent pvc rsr' pattern suggest right ventricular conduction delay   qtc 457          HPI:69-year-old male with history of hypertension, diverticulitis, CAD, BPH, TIA, cholecystectomy presents for evaluation of numbness and tingling at the right corner of the mouth and at the right index finger that started approximately 9 to 10 hours ago.  Patient denies any slurred speech or weakness.  Patient denies any ataxia and was able to drive here and ambulate from the car into the emergency department.  Patient notes that his symptoms during the prior TIA were more severe and involve numbness and tingling of the entire right side of the face.  Patient's triage NIH stroke scale is 0.  Patient states that he also has intermittent dizziness described as a lightheadedness vertigo.  Patient denies any chest pain or shortness of breath.  Based on the patient's exam NIH stroke scale of 0, code stroke was not called. CTA head and neck negative pt admitted for neurology consult and further work up.     PAST MEDICAL & SURGICAL HISTORY:  HTN (hypertension)  Diverticulitis  Hypertension  Diverticulosis  BPH (benign prostatic hyperplasia)  Other obstructive and reflux uropathy  CAD (coronary artery disease)  History of cholecystectomy  S/P partial colectomy  for diverticulitis  History of inguinal hernia repair  History of cholecystectomy  History of diverticulitis  resection 2012  History of prostate surgery      FAMILY HISTORY:  Family history of cardiac disorder in father (Father)   age 58    Family history of diabetes mellitus in father (Father)      Social History:  no smoking alcohol or illicit drug use     Allergies    No Known Allergies    Intolerances        MEDICATIONS  (STANDING):    MEDICATIONS  (PRN):  acetaminophen     Tablet .. 650 milliGRAM(s) Oral every 6 hours PRN Mild Pain (1 - 3)  aluminum hydroxide/magnesium hydroxide/simethicone Suspension 30 milliLiter(s) Oral every 4 hours PRN Dyspepsia  melatonin 3 milliGRAM(s) Oral at bedtime PRN Insomnia  ondansetron Injectable 4 milliGRAM(s) IV Push every 6 hours PRN Nausea and/or Vomiting      ROS:  General:  No fevers, chills, or unexplained weight loss  Skin: No rash or bothersome skin lesions  Musculoskeletal: No arthalgias, myalgias or joint swelling  Eyes: No visual changes or eye pain  Ears: No hearing loss , otorrhea or ear pain  Nose, Mouth, Throat: No nasal congestion, rhinorrhea, oral lesions, postnasal drip or sore throat  Cardio: No chest pain or palpitations. no lower extremity edema. no syncope. no claudication.   Respiratory: No cough, shortness of breath or wheezing   GI: No diarrhea, constipation, blood in stools, abdominal pain, vomiting or heartburn  : No urinary frequency, hematuria, incontinence, or dysuria  Neurologic: + numbness   Psychiatric:  No anxiety or depression  Lymphatic:  No easy bruising, easy bleeding or swollen glands  Allergic: No itching, sneezing , watery eyes, clear rhinorrhea or recurrent infections    PEx  T(C): 36.6 (25 @ 06:47), Max: 36.6 (25 @ 06:47)  HR: 72 (25 @ :47) (72 - 80)  BP: 120/86 (25 @ 06:47) (120/86 - 143/96)  RR: 14 (25 @ :47) (14 - 19)  SpO2: 98% (25 @ 06:47) (98% - 99%)  Wt(kg): --  General:     no acute distress, no identifying marks , scars, or tattoos.  Skin: no rash or prominent lesions  Head: normocephalic, atraumatic     Sinuses: non-tender  Nose: no external lesions, mucosa non-inflamed, septum and turbinates normal  Throat: no erythema, exudates or lesions.  Neck: Supple without lymphadenopathy. Thyroid no thyromegaly, no palpable thyroid nodules, no palpable nodules or masses, carotid arteries no bruits.   Heart: RRR, no murmur or gallop.  Normal S1, S2.  No S3, S4.   Lungs: CTA bilaterally, no wheezes, rhonchi, rales.  Breathing unlabored.   Chest wall: Normal insp   Abdomen:  Soft, NT/ND, normal bowel sounds, no HSM, no masses.  No peritoneal signs.   Extremities: No deformities, clubbing, cyanosis, or edema.  Musculoskeletal: Normal gait and station. No decreased range of motion, instability, atrophy or abnormal strength or tone in the head, neck, spine, ribs, pelvis or extremities.   Neurologic: CN 2-12 normal. Sensation to pain, touch and proprioception normal. DTRs normal in upper and lower extremities. No pathologic reflexes.  Motor normal.  Psychiatric: Oriented X3, intact recent and remote memory, judgement and insight, normal mood and affect.                          17.6   10.92 )-----------( 214      ( 2025 05:40 )             50.0     02-24    139  |  108  |  14  ----------------------------<  137[H]  3.9   |  23  |  1.09    Ca    9.2      2025 05:40  Mg     2.3     02-24    TPro  7.8  /  Alb  4.0  /  TBili  0.9  /  DBili  x   /  AST  37  /  ALT  50  /  AlkPhos  82  02-24    CAPILLARY BLOOD GLUCOSE        PT/INR - ( 2025 05:40 )   PT: 12.6 sec;   INR: 1.07 ratio         PTT - ( 2025 05:40 )  PTT:33.9 sec  Urinalysis Basic - ( 2025 05:40 )    Color: x / Appearance: x / SG: x / pH: x  Gluc: 137 mg/dL / Ketone: x  / Bili: x / Urobili: x   Blood: x / Protein: x / Nitrite: x   Leuk Esterase: x / RBC: x / WBC x   Sq Epi: x / Non Sq Epi: x / Bacteria: x          Radiology/Imaging, I have personally reviewed:    CTA head and neck   IMPRESSION:    CT HEAD:  No acute intracranial hemorrhage, mass effect, or midline shift.    CTA NECK:  No evidence of significant stenosis or occlusion.    CTA HEAD:  No large vessel occlusion, significant stenosis or vascular abnormality   identified.      EKG sinus rhythmn w/ frequent pvc rsr' pattern suggest right ventricular conduction delay   qtc 457

## 2025-02-24 NOTE — ED ADULT NURSE REASSESSMENT NOTE - NS ED NURSE REASSESS COMMENT FT1
Assumed care of pt from Lidya RN, pt A&OX4, nsr on cm, denies chest pain, sob, dizziness at this time

## 2025-02-24 NOTE — ED ADULT NURSE NOTE - OBJECTIVE STATEMENT
69yM AOx4 ambulatory, presents to  ED c/o R sided facial and R 1st finger numbness. PMH TIA, HTN, CAD. + AC use. pt's last known well at 10 hours PTA. + rom in all extremities. NIH score 1. pt denies CP, SOB, headache. pt has resolved dizziness. placed on cardiac monitor, IV inserted labs drawn and sent. ekg completed.

## 2025-02-24 NOTE — PHYSICAL THERAPY INITIAL EVALUATION ADULT - PERTINENT HX OF CURRENT PROBLEM, REHAB EVAL
69-year-old male with history of hypertension, diverticulitis, CAD, BPH, TIA, cholecystectomy presents for evaluation of numbness and tingling at the right corner of the mouth and at the right index finger that started approximately 9 to 10 hours ago.  Patient denies any slurred speech or weakness.  Patient denies any ataxia and was able to drive here and ambulate from the car into the emergency department.  Patient notes that his symptoms during the prior TIA were more severe and involve numbness and tingling of the entire right side of the face.  Patient's triage NIH stroke scale is 0.  Patient states that he also has intermittent dizziness described as a lightheadedness vertigo.  Patient denies any chest pain or shortness of breath.  Based on the patient's exam NIH stroke scale of 0, code stroke was not called. CTA head and neck negative pt admitted for neurology consult and further work up.

## 2025-02-24 NOTE — ED ADULT TRIAGE NOTE - CHIEF COMPLAINT QUOTE
patient ambulatory to triage, complains of numbness to right side face/mouth, right index finger. resolved lightheadedness. reports symptoms started 10 hours ago, had similar symptoms in past and was diagnosed with TIA.   home meds: metoprolol, amlodipine. not anticoagulant medications.  at triage.  eval by ED MD at triage,

## 2025-02-24 NOTE — H&P ADULT - ASSESSMENT
#TIA r/o   CTA head and neck negative   - admit to tele   - q4 hour neuro checks   - neurology consulted, appreciate recs   - TTE with bubble   - s/p ASA load in ED  - f/u lipid panel, A1c   - f/u mri brain     #Leukocytosis   Likely reactive pt afebrile   - monitor vitals   - trend wbc    69-year-old male with history of hypertension, diverticulitis, CAD, BPH, TIA, cholecystectomy presents for evaluation of numbness and tingling at the right corner of the mouth and at the right index finger admitted for TIA work up.       #TIA r/o   CTA head and neck negative   - admit to tele   - q4 hour neuro checks   - neurology consulted, appreciate recs   - TTE with bubble   - s/p ASA load in ED  - cw asa 81 mg daily   - cw statin f/u lipid panel consider increasing dose   - f/u lipid panel, A1c   - f/u mri brain   - PT consult     #Leukocytosis   Likely reactive pt afebrile   - monitor vitals   - trend wbc     #HTN   - cw losartan   - cw triamterene hctz combo   - cw amlodipine   - cw metoprolol

## 2025-02-24 NOTE — ED PROVIDER NOTE - PHYSICAL EXAMINATION
CONSTITUTIONAL: Well appearing, awake, alert, oriented to person, place, time/situation and in no apparent distress.  · ENMT: Airway patent, Nasal mucosa clear. Mouth with normal mucosa. Throat has no vesicles, no oropharyngeal exudates and uvula is midline.  · EYES: Clear bilaterally, pupils equal, round and reactive to light.  · CARDIAC: Normal rate, regular rhythm.  Heart sounds S1, S2.  No murmurs, rubs or gallops.  · RESPIRATORY: Breath sounds clear and equal bilaterally.  · GASTROINTESTINAL: Abdomen soft, non-tender, no guarding.  · MUSCULOSKELETAL: Spine appears normal, range of motion is not limited, no muscle or joint tenderness  · NEUROLOGICAL: Alert and oriented, no focal deficits, no motor or sensory deficits.  · SKIN: Skin normal color for race, warm, dry and intact. No evidence of rash    See NIH stroke scale  Patient ambulating without ataxia

## 2025-02-24 NOTE — CONSULT NOTE ADULT - ASSESSMENT
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  Patient denies any slurred speech or weakness.  Patient denies any ataxia and was able to drive here and ambulate from the car into the emergency department.  Patient notes that his symptoms during the prior TIA were more severe and involve numbness and tingling of the entire right side of the face.  Patient's triage NIH stroke scale was 0.  Patient states that he also has intermittent dizziness described as a lightheadedness vertigo.  Patient denies any chest pain or shortness of breath.  Based on the patient's exam NIH stroke scale of 0, code stroke was not called.  CTA head and neck negative pt admitted for neurology consult and further work up.             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  Patient denies any slurred speech or weakness.  Patient denies any ataxia and was able to drive here and ambulate from the car into the emergency department.  Patient notes that his symptoms during the prior TIA were more severe and involve numbness and tingling of the entire right side of the face.  Patient's triage NIH stroke scale was 0.  Patient states that he also has intermittent dizziness described as a lightheadedness vertigo.  Patient denies any chest pain or shortness of breath.  Based on the patient's exam NIH stroke scale of 0, code stroke was not called.  CTA head and neck negative and pt admitted for neurology consult and further work up.             Recommendations  -Monitor on tele  -Neurochecks/VS q 4  -F/U MRI brain  -F/U Echo  -DVT ppx  -PT/OT eval    69-year-old male with history of hypertension, CAD, acute lacunar L thalamo capsular infarct 4/25/23,  presents for evaluation of numbness and tingling at the right corner of the mouth and at the right index finger that started approximately 5 pm last night.  Patient denies any dysarthria, facial droop, diplopia, focal weakness, unsteady gait, dizziness, HA.  Patient drove himself to the emergency department this morning because his sx's were still present.  Patient notes that his symptoms during the prior TIA were more severe and involve numbness and tingling of the entire right side of the face.  Patient's triage NIH stroke scale was 0, and code stroke was not called.  CTA head and neck was negative for acute findings.  Neurology consulted for possible stroke w/u. On PE no focal findings, NIHSS 0, but patient with subtle paresthesias R lower lip and tip of index finger.  TTE neg for PFO.      #R/O small thalamus/brainstem stroke      Recommendations  -Monitor on tele  -Neurochecks/VS q 4  -F/U MRI brain  -DVT ppx  -PT/OT eval  -Further recommendations pending MRI results  -Continue ASA, HD statin (titrate to LDL goal <70)  -Check A1c (goal <5.7), LDL  -Neurology to follow.  Please page or call with any questions  -F/U with Neurology outpatient    D/W Dr. Taylor, patient

## 2025-02-24 NOTE — CHART NOTE - NSCHARTNOTEFT_GEN_A_CORE
Received call from MRI about new infarct. Pt's antihypertensives d/c to allow for permissive htn for 24 hrs. Started pt on plavix and increased pt's lipitor to 80 mg daily
Discussion with: Dr Vaughan, ED Attending    HISTORY OF PRESENT ILLNESS:  Mr./Ms. (SHYAM STREETER) (MRN [id number] ) evaluated for Patient is a 69y old  Male who presents with a chief complaint of R facial and hand numbness. Stroke risk factors are (hx of stroke, HTN, and CAD) and PMH/PSH is PAST MEDICAL & SURGICAL HISTORY:  HTN (hypertension)      Diverticulitis      Hypertension      Diverticulosis      BPH (benign prostatic hyperplasia)      Other obstructive and reflux uropathy      CAD (coronary artery disease)      History of cholecystectomy      S/P partial colectomy  for diverticulitis      History of inguinal hernia repair      History of cholecystectomy      History of diverticulitis  resection 2012      History of prostate surgery      Presented to (OSH) on (date/time). Last known normal time was 10 hours PTA.     Pre-stroke MRS=    *Modified [place] Score   0 - No symptoms.  1 - No significant disability. Able to carry out all usual activities, despite some symptoms.  2 - Slight disability. Able to look after own affairs without assistance, but unable to carry out all previous activities.  3 - Moderate disability. Requires some help, but able to walk unassisted.  4 - Moderately severe disability. Unable to attend to own bodily needs without assistance, and unable to walk unassisted.  5 - Severe disability. Requires constant nursing care and attention, bedridden, incontinent.    CT HEAD IMAGING RESULTS:  CT Head: no acuity  CTA/P without LVO or hemodynamically significant stenosis    Labs:  CAPILLARY BLOOD GLUCOSE    137    Platelet Count - Automated: 214 K/uL (02-24-25 @ 05:40)    PT/INR - ( 24 Feb 2025 05:40 )   PT: 12.6 sec;   INR: 1.07 ratio         PTT - ( 24 Feb 2025 05:40 )  PTT:33.9 sec  NEUROLOGIC EXAMINATION deferred – interprofessional audio discussion only     Inclusion Criteria:  Clearly defined time onset within 4.5 hours of treatment Yes [] No []    Ischemic stroke with a measurable deficit on NIHSS or a non-measurable deficit that is deemed disabling?  Yes [] no []  or  Unclear time of onset wake-up with stroke symptoms yes [] no[]  If yes:  [] Treatment can be initiated within 4.5 hrs. from symptom recognition  [] MRI can be obtained acutely from the emergency department  [] Not an endovascular stroke therapy candidate  [] Baseline functional independence.  Pre-stroke mRS of 0-1  [] NIHSS less than 26  [] DW-MRI with diffusion-positive. FLAIR – negative lesions indicating timing of stroke onset less than 4.5 hrs.  [] MRI mismatch between abnormal signal on DW-MRI and no visible signal change on FLAIR        Review thrombolytic CONTRAINDICATIONS  [] None    ABSOLUTE EXCLUSION CRITERIA:  [x] Patient outside of the appropriate time window for IV thrombolysis  [] Evidence of intracranial hemorrhage on pretreatment CT scan (CT must be read by an attending radiologist or attending neurologist)  [] Head CT findings suggesting an established acute cerebral infarction as evidenced by josh hypodensity, regardless of size.  [] Clinical presentation consistent with subarachnoid hemorrhage, even with normal CT scan  [] Active internal bleeding  [] Known bleeding diathesis (including but not limited to platelet count < 100,000, use of oral anticoagulants with INR>1.7, use of full dose low molecular       weight heparin within the last 24 hours, use of unfractionated heparin AND a prolonged PTT (> 40sec), use of direct thrombin inhibitor (e.g. dabigatran) or oral direct Factor Xa inhibitor (e.g. rivaroxaban, apixaban) within 48 hours) with any degree of abnormality on any coagulation test; any DOAC taken within 3 hours of presentation, regardless of coagulation test results  [] Systolic pressure >= 185 mmHg or diastolic pressure 110 mmHg on repeated measurements at the time treatment is to begin  [] Care team unable to determine eligibility for IV thrombolysis  [] Patient, family, or surrogate declines and understands risks and benefits of treatment.  [] For wake-up Protocol patients: DW-MRI lesions larger than one-third of the MCA territory    RELATIVE EXCLUSION CRITERIA  [] Isolated neurological deficits (except for aphasia or hemianopsia)  [] stroke severity too mild (non-disabling)  [] Seizure at onset with post-ictal residual neurological impairment  [] Gastrointestinal, genitourinary or respiratory hemorrhage within 21 days   [] Major surgery within 14 days   [] Blood glucose level < 50 or > 400 mG/dL  [] CPR with chest compressions or minor surgery (including liver and kidney biopsy, thoracocentesis, lumbar puncture) within 10 days   [] Arterial puncture at non-compressible site within 7 days   [] Evidence of acute trauma (fracture)   Significant head trauma or prior stroke in the previous 3 months  History of previous intracranial hemorrhage  Intracranial or intraspinal surgery within past 3 months[] Diabetic hemorrhagic retinopathy or ophthalmic bleeding  [] Pregnancy or peripartum; no nursing post- treatment  [] Post-myocardial infarction pericarditis  [] Peritoneal dialysis or hemodialysis or severe hepatic disease   [] Known bacterial endocarditis   [] Life expectancy less than 6 months or sever co-morbid illness   [] Known cerebral vascular malformation, untreated intracranial aneurysm or brain tumor (may consider IV alteplase in patients with CNS lesions that have a very low likelihood of hemorrhage, such as small un-ruptured aneurysms or benign tumors with low vascularity.  [] Social/Lutheran reason  [] Other ____________________    A/P     TIA, r/o mild CVA, NIHSS 0  no TNK as pt is out of window and symptoms have resolved  no MT given no LVO  permissive HTN   stroke core measures- lipid profile, a1c, TSH  TTE, tele, MR brain  DAPT (load with ASA and plavix now, followed by maintenance doses tomorrow) per CHANCE trial for TIA vs mild-moderate CVA, statin  inpatient stroke neurology consultation    Time spent during discussion (in minutes):__12__

## 2025-02-25 ENCOUNTER — TRANSCRIPTION ENCOUNTER (OUTPATIENT)
Age: 70
End: 2025-02-25

## 2025-02-25 VITALS
DIASTOLIC BLOOD PRESSURE: 90 MMHG | HEART RATE: 77 BPM | SYSTOLIC BLOOD PRESSURE: 113 MMHG | TEMPERATURE: 98 F | OXYGEN SATURATION: 97 % | RESPIRATION RATE: 18 BRPM

## 2025-02-25 LAB
ALBUMIN SERPL ELPH-MCNC: 4 G/DL — SIGNIFICANT CHANGE UP (ref 3.3–5)
ALP SERPL-CCNC: 78 U/L — SIGNIFICANT CHANGE UP (ref 40–120)
ALT FLD-CCNC: 50 U/L — SIGNIFICANT CHANGE UP (ref 12–78)
ANION GAP SERPL CALC-SCNC: 4 MMOL/L — LOW (ref 5–17)
AST SERPL-CCNC: 29 U/L — SIGNIFICANT CHANGE UP (ref 15–37)
BILIRUB SERPL-MCNC: 1 MG/DL — SIGNIFICANT CHANGE UP (ref 0.2–1.2)
BUN SERPL-MCNC: 21 MG/DL — SIGNIFICANT CHANGE UP (ref 7–23)
CALCIUM SERPL-MCNC: 9.4 MG/DL — SIGNIFICANT CHANGE UP (ref 8.5–10.1)
CHLORIDE SERPL-SCNC: 105 MMOL/L — SIGNIFICANT CHANGE UP (ref 96–108)
CHOLEST SERPL-MCNC: 121 MG/DL — SIGNIFICANT CHANGE UP
CHOLEST SERPL-MCNC: 126 MG/DL — SIGNIFICANT CHANGE UP
CO2 SERPL-SCNC: 27 MMOL/L — SIGNIFICANT CHANGE UP (ref 22–31)
CREAT SERPL-MCNC: 1.15 MG/DL — SIGNIFICANT CHANGE UP (ref 0.5–1.3)
EGFR: 68 ML/MIN/1.73M2 — SIGNIFICANT CHANGE UP
GLUCOSE SERPL-MCNC: 136 MG/DL — HIGH (ref 70–99)
HCT VFR BLD CALC: 48.4 % — SIGNIFICANT CHANGE UP (ref 39–50)
HDLC SERPL-MCNC: 39 MG/DL — LOW
HDLC SERPL-MCNC: 39 MG/DL — LOW
HGB BLD-MCNC: 16.8 G/DL — SIGNIFICANT CHANGE UP (ref 13–17)
LIPID PNL WITH DIRECT LDL SERPL: 50 MG/DL — SIGNIFICANT CHANGE UP
LIPID PNL WITH DIRECT LDL SERPL: 52 MG/DL — SIGNIFICANT CHANGE UP
MCHC RBC-ENTMCNC: 29.8 PG — SIGNIFICANT CHANGE UP (ref 27–34)
MCHC RBC-ENTMCNC: 34.7 G/DL — SIGNIFICANT CHANGE UP (ref 32–36)
MCV RBC AUTO: 85.8 FL — SIGNIFICANT CHANGE UP (ref 80–100)
NON HDL CHOLESTEROL: 82 MG/DL — SIGNIFICANT CHANGE UP
NON HDL CHOLESTEROL: 88 MG/DL — SIGNIFICANT CHANGE UP
NRBC # BLD AUTO: 0 K/UL — SIGNIFICANT CHANGE UP (ref 0–0)
NRBC # FLD: 0 K/UL — SIGNIFICANT CHANGE UP (ref 0–0)
NRBC BLD AUTO-RTO: 0 /100 WBCS — SIGNIFICANT CHANGE UP (ref 0–0)
PLATELET # BLD AUTO: 197 K/UL — SIGNIFICANT CHANGE UP (ref 150–400)
PMV BLD: 9.5 FL — SIGNIFICANT CHANGE UP (ref 7–13)
POTASSIUM SERPL-MCNC: 3.5 MMOL/L — SIGNIFICANT CHANGE UP (ref 3.5–5.3)
POTASSIUM SERPL-SCNC: 3.5 MMOL/L — SIGNIFICANT CHANGE UP (ref 3.5–5.3)
PROT SERPL-MCNC: 7.5 GM/DL — SIGNIFICANT CHANGE UP (ref 6–8.3)
RBC # BLD: 5.64 M/UL — SIGNIFICANT CHANGE UP (ref 4.2–5.8)
RBC # FLD: 13.3 % — SIGNIFICANT CHANGE UP (ref 10.3–14.5)
SODIUM SERPL-SCNC: 136 MMOL/L — SIGNIFICANT CHANGE UP (ref 135–145)
TRIGL SERPL-MCNC: 195 MG/DL — HIGH
TRIGL SERPL-MCNC: 224 MG/DL — HIGH
WBC # BLD: 8.74 K/UL — SIGNIFICANT CHANGE UP (ref 3.8–10.5)
WBC # FLD AUTO: 8.74 K/UL — SIGNIFICANT CHANGE UP (ref 3.8–10.5)

## 2025-02-25 PROCEDURE — 99239 HOSP IP/OBS DSCHRG MGMT >30: CPT

## 2025-02-25 PROCEDURE — 99232 SBSQ HOSP IP/OBS MODERATE 35: CPT

## 2025-02-25 RX ORDER — CLOPIDOGREL BISULFATE 75 MG/1
1 TABLET, FILM COATED ORAL
Qty: 30 | Refills: 0
Start: 2025-02-25 | End: 2025-03-26

## 2025-02-25 RX ADMIN — METOPROLOL SUCCINATE 100 MILLIGRAM(S): 50 TABLET, EXTENDED RELEASE ORAL at 10:16

## 2025-02-25 RX ADMIN — Medication 81 MILLIGRAM(S): at 05:14

## 2025-02-25 RX ADMIN — CLOPIDOGREL BISULFATE 75 MILLIGRAM(S): 75 TABLET, FILM COATED ORAL at 10:16

## 2025-02-25 RX ADMIN — HEPARIN SODIUM 5000 UNIT(S): 1000 INJECTION INTRAVENOUS; SUBCUTANEOUS at 10:16

## 2025-02-25 NOTE — DISCHARGE NOTE PROVIDER - CARE PROVIDER_API CALL
Jamel Seo  Interventional Cardiology  [address] [place] [id number]  Phone: [phone]  Fax: [phone]  Established Patient  Scheduled Appointment: 02/28/2025

## 2025-02-25 NOTE — DISCHARGE NOTE NURSING/CASE MANAGEMENT/SOCIAL WORK - PATIENT PORTAL LINK FT
You can access the FollowMyHealth Patient Portal offered by [facility] by registering at the following website: [email or web address]. By joining [facility]’s FollowMyHealth portal, you will also be able to view your health information using other applications (apps) compatible with our system.

## 2025-02-25 NOTE — DISCHARGE NOTE PROVIDER - ATTENDING DISCHARGE PHYSICAL EXAMINATION:
VITALS:  T(F): 97.5 (02-25-25 @ 09:18), Max: 98.1 (02-24-25 @ 15:40)  HR: 77 (02-25-25 @ 09:18) (74 - 79)  BP: 113/90 (02-25-25 @ 09:18) (104/74 - 134/97)  RR: 18 (02-25-25 @ 09:18) (16 - 18)  SpO2: 97% (02-25-25 @ 09:18) (93% - 97%)  Wt(kg): --    I&O's Summary      CAPILLARY BLOOD GLUCOSE          PHYSICAL EXAM:  Gen: No acute distress  HEENT:  Normocephalic/Atraumatic  CV:  +S1, +S2, regular, no murmurs or rubs  RESP:   lungs clear to auscultation bilaterally, no wheezing, rales, rhonchi  GI:  abdomen soft, non-tender, non-distended  EXT:  no clubbing, no cyanosis, no edema  NEURO:  No focal neurological deficits

## 2025-02-25 NOTE — PROGRESS NOTE ADULT - NS ATTEND BILL GEN_ALL_CORE
Physical Therapy Visit    Visit Type: Daily Treatment Note  Visit: 3  Referring Provider: Caridad Sánchez DO  Medical Diagnosis (from order): M25.512, G89.29 - Chronic left shoulder pain     SUBJECTIVE                                                                                                               Patient pushed herself on the floor using the L UE which made her pain worse when trimming her dog's nails. She Has an MRI scheduled for 1/30/2024. Shoulder exercises are better without weights.     Pain / Symptoms  - Pain rating (out of 10): Current: 2       OBJECTIVE                                                                                                                                      Outcome/Assessments  Outcome Measures: 12/3/2024   Quick Disabilities of the Arm, Shoulder and Hand: QuickDash Total Score (Score will not calculate if more then 2 questions are left blank): 20.45   (scored 0-100; a higher score indicates greater disability) see flowsheet for additional documentation  Treatment     Therapeutic Exercise  UBE resistance 5.0 x2 min forward x2 min retro   Rows blue theraband 2x15   Shoulder internal rotation blue theraband 2x15 L UE   B shoulder extension red theraband 2x15  Shoulder external rotation Blue theraband 2x15   Serratus flexion wall slides x10   Serratus flexion wall slides with Red theraband 2x10   Wall clocks Yellow theraband  x10 B UE   Shoulder internal rotation wall slides 2x10   Shoulder internal rotation with wand behind back x10     Not today:   Seated scapular retraction 5 seconds x10   Sidelying horizontal abduction 2 lb x10 L UE - popping and pain. Dropped weight 0 lbs x15   Sidelying shoulder abduction (palm up) 0 lbs 2x15 L UE   Sidelying external rotation 1 lb 2x15 L UE   Sidelying shoulder flexion 1 lb 2x15  Prone horizontal abduction 1 lb 2x10 L UE   Prone shoulder extension 2 lbs x15 L UE   Prone Y - unable      Manual Therapy   K tape to L shoulder to  increase support and decrease pain with activity. Education on removal if irritation occurs.     Seated   Soft tissue mobilization L upper trapezius, L side scalenes    Skilled input: verbal instruction/cues and tactile instruction/cues    Home Exercise Program  Access Code: [email or web address]  URL: [email or web address]  Date: 12/18/2024  Prepared by: Faina Presley    Exercises  - Seated Scapular Retraction  - 1 x daily - 7 x weekly - 2 sets - 10 reps - 5 seconds  hold  - Sidelying Shoulder Horizontal Abduction  - 1 x daily - 7 x weekly - 2 sets - 15 reps  - Sidelying Shoulder Abduction Palm Forward  - 1 x daily - 7 x weekly - 2 sets - 15 reps  - Prone Single Arm Shoulder Horizontal Abduction with Dumbbell - Palm Down  - 1 x daily - 7 x weekly - 2-3 sets - 10 reps  - Prone Shoulder Extension - Single Arm  - 1 x daily - 7 x weekly - 2 sets - 15 reps  - Sidelying Shoulder External Rotation  - 1 x daily - 7 x weekly - 2 sets - 10 reps  - Serratus Activation at Wall  - 1 x daily - 7 x weekly - 2 sets - 10 reps  - Standing Shoulder Internal Rotation with Anchored Resistance  - 1 x daily - 7 x weekly - 2 sets - 15 reps  - Standing Shoulder Row with Anchored Resistance  - 1 x daily - 7 x weekly - 2 sets - 10 reps  - Shoulder External Rotation with Anchored Resistance  - 1 x daily - 7 x weekly - 2-3 sets - 10 reps  - Wall Clock with Theraband  - 1 x daily - 7 x weekly - 1-2 sets - 10 reps  - Standing Bilateral Shoulder Internal Rotation AAROM with Dowel  - 1 x daily - 7 x weekly - 10 reps      ASSESSMENT                                                                                                            Sx severity is decreased compared to presentation in previous visit. Pinching sensation in the L shoulder is decreased with cues to retract shoulder with shoulder internal rotation range of motion exercise.  L upper trapezius has hypertonicity which was addressed with manual therapy today. Pain does not  increase with treatment today. Updated home program with stabilization and external rotator progressions today which she tolerated well with only mild irritation caused with external rotation exercise. Patient is likely to benefit from skilled physical therapy to improve L shoulder AROM and functional use of the L UE.     Pain/symptoms after session (out of 10): 0  Education:   - Results of above outlined education: Demonstrates understanding and Verbalizes understanding    PLAN                                                                                                                           Suggestions for next session as indicated:  Progress per plan of care.  Shoulder stabilizer and rotator cuff strengthening progressions per pt toleration.       Goals  Long Term Goals: to be met by end of plan of care  1. Quick DASH: Patient will score 6.45 or lower on The Quick DASH to indicate a decreased level of impairment with lifting, carrying, household and self-care activity. (minimal clinically important difference: 14 of calculated score)  2. Patient is independent with progression of home exercise program for long term maintenance of L shoulder pain.   3. Functional internal rotation reach behind back is not painful to allow patient to don and doff her bra without limitation from the L UE.   4. Patient can don and doff her winter jacket without limitation from the L shoulder.   5. Improve L shoulder flexion AROM to 155 and pain free to allow patient to lift overhead with daily functional tasks and overhead presses while working out.     Therapy procedure time and total treatment time can be found documented on the Time Entry flowsheet     Attending to bill

## 2025-02-25 NOTE — DISCHARGE NOTE PROVIDER - NSDCCPCAREPLAN_GEN_ALL_CORE_FT
PRINCIPAL DISCHARGE DIAGNOSIS  Diagnosis: Left thalamic infarction  Assessment and Plan of Treatment:

## 2025-02-25 NOTE — PROGRESS NOTE ADULT - NS ATTEND AMEND GEN_ALL_CORE FT
Acute left thalamic stroke despite aspirin therapy.  He has been switched to clopidogrel. Avoiding DAPT due to chronic microhemorrhages.  Recommend ILR placement for long term arrhythmia monitoring.  Continue statin.  If not previously done, would also get outpatient sleep study to evaluate for possible JOSE MANUEL.

## 2025-02-25 NOTE — DISCHARGE NOTE PROVIDER - NSDCQMPATIENTREHAB_NEU_A_CORE
RX requested too soon. Pt has active rx from 11/19/24 for 90 day and 3 refills.   
Assessment performed

## 2025-02-25 NOTE — DISCHARGE NOTE NURSING/CASE MANAGEMENT/SOCIAL WORK - FINANCIAL ASSISTANCE
[facility] provides services at a reduced cost to those who are determined to be eligible through [facility]’s financial assistance program. Information regarding [facility]’s financial assistance program can be found by going to [email or web address] or by calling [phone].

## 2025-02-25 NOTE — DISCHARGE NOTE PROVIDER - NSDCFUSCHEDAPPT_GEN_ALL_CORE_FT
Riley Dickinson  [facility]  [address]  Scheduled Appointment: 03/04/2025    Michelle Jasmine  [facility]  [address]  Scheduled Appointment: 04/09/2025

## 2025-02-25 NOTE — DISCHARGE NOTE NURSING/CASE MANAGEMENT/SOCIAL WORK - NSDCPEFALRISK_GEN_ALL_CORE
For information on Fall & Injury Prevention, visit: [email or web address] OR  [email or web address] OR  https://www.cdc.gov/steadi/patient.html

## 2025-02-25 NOTE — DISCHARGE NOTE PROVIDER - NSDCMRMEDTOKEN_GEN_ALL_CORE_FT
amLODIPine 10 mg oral tablet: 1 tab(s) orally once a day  atorvastatin 10 mg oral tablet: 1 tab(s) orally once a day (at bedtime)  clopidogrel 75 mg oral tablet: 1 tab(s) orally once a day  losartan 25 mg oral tablet: 1 tab(s) orally once a day  metoprolol tartrate 100 mg oral tablet: 1 tab(s) orally 2 times a day  triamterene-hydrochlorothiazide 37.5 mg-25 mg oral capsule: 1 cap(s) orally once a day

## 2025-02-25 NOTE — DISCHARGE NOTE PROVIDER - HOSPITAL COURSE
69-year-old male with history of hypertension, CAD, acute lacunar L thalamo capsular infarct 4/25/23,  presents for evaluation of numbness and tingling at the right corner of the mouth and at the right index finger that started approximately 5 pm the night prior to arrival.      #Acute L thalamic stroke  #Hx of Lacunar L thalamo capsular infarct 4/25/23  MRI notable for acute L thalamus stroke. Two small foci of susceptibility artifact in the left thalamus and right parietal lobe suggesting chronic micro hemorrhages.   -Neurology consult noted  -D/C ASA (Avoid DAPT given chronic micro hemorrhages noted on imaging) and replace with Plavix   -Cont Statin  -Discussed Case with Cardiology;  -Patient has had [facility]OT in 2023 with his previous stroke. Neurology recommending ILR placement for long term arrhythmia monitoring. The patient tells me he follows with Dr. Seo of Cardiology and has a follow up appointment this Friday, 2/28 and that he rather follow up with him than stay in the hospital and get the ILR placed now. R/B/A's discussed and acknowledged by the patient. I discussed the case with Cardiology NP, Phuong Duran--she will alert the outpatient office for need for ILR placement. The patient has been counseled on the importance of following up with Cardiology for ILR placement.     #HTN   - cw losartan   - cw triamterene hctz combo   - cw amlodipine   - cw metoprolol

## 2025-02-25 NOTE — PROGRESS NOTE ADULT - SUBJECTIVE AND OBJECTIVE BOX
No acute events overnight, no new complaints.  Mild paresthesias R lower lip.   MRI + for acute L thalamus stroke. Two small foci of susceptibility artifact in the left thalamus and right parietal lobe suggesting chronic micro hemorrhages. TTE neg for PFO    ROS: As stated above otherwise neg    MEDICATIONS  (STANDING):  atorvastatin 80 milliGRAM(s) Oral at bedtime  clopidogrel Tablet 75 milliGRAM(s) Oral daily  heparin   Injectable 5000 Unit(s) SubCutaneous every 12 hours  metoprolol tartrate 100 milliGRAM(s) Oral two times a day      Vital Signs Last 24 Hrs  T(C): 36.4 (25 Feb 2025 09:18), Max: 36.7 (24 Feb 2025 15:40)  T(F): 97.5 (25 Feb 2025 09:18), Max: 98.1 (24 Feb 2025 15:40)  HR: 77 (25 Feb 2025 09:18) (74 - 79)  BP: 113/90 (25 Feb 2025 09:18) (104/74 - 134/97)  BP(mean): 98 (25 Feb 2025 09:18) (98 - 98)  RR: 18 (25 Feb 2025 09:18) (16 - 18)  SpO2: 97% (25 Feb 2025 09:18) (93% - 97%)    Parameters below as of 25 Feb 2025 09:18  Patient On (Oxygen Delivery Method): room air      Neurological exam:  HF: A x O x 3. Appropriately interactive.  Speech fluent, No Aphasia or paraphasic errors. Naming /repetition intact   CN: RICHELLE, EOMI, VFF, facial sensation normal, no NLFD, tongue midline, Palate moves equally  Motor: No pronator drift, Strength 5/5 in all 4 ext, normal bulk and tone, no tremors  Sens: Intact to light touch     Reflexes: Symmetric and normal,  downgoing toes b/l  Coord:  No FNFA, dysmetria, JAME intact   Gait/Balance: Normal    NIHSS: 0                          16.8   8.74  )-----------( 197      ( 25 Feb 2025 08:45 )             48.4     02-25    136  |  105  |  21  ----------------------------<  136[H]  3.5   |  27  |  1.15    Ca    9.4      25 Feb 2025 08:45  Mg     2.3     02-24    TPro  7.5  /  Alb  4.0  /  TBili  1.0  /  DBili  x   /  AST  29  /  ALT  50  /  AlkPhos  78  02-25 02-24 Chol 121 LDL -- HDL 39[L] Trig 195[H]    Radiology report:    < from: CT Angio Neck w/ IV Cont (02.24.25 @ 06:23) >  IMPRESSION:    CT HEAD:  No acute intracranial hemorrhage, mass effect, or midline shift.    CTA NECK:  No evidence of significant stenosis or occlusion.    CTA HEAD:  No large vessel occlusion, significant stenosis or vascular abnormality   identified.    < from: TTE W or WO Ultrasound Enhancing Agent (02.24.25 @ 10:49) >  CONCLUSIONS:      1. Left ventricular systolic function is normal with an ejection fraction of 53 % by Martinez's method of disks.   2. Normal right ventricular systolic function.   3. Mild mitral regurgitation.   4. Agitated saline injection was negative for intracardiac shunt. No acute events overnight, no new complaints.  Mild paresthesias R lower lip.   MRI + for acute L thalamus stroke. Two small foci of susceptibility artifact in the left thalamus and right parietal lobe suggesting chronic micro hemorrhages. TTE neg for PFO    ROS: As stated above otherwise neg    MEDICATIONS  (STANDING):  atorvastatin 80 milliGRAM(s) Oral at bedtime  clopidogrel Tablet 75 milliGRAM(s) Oral daily  heparin   Injectable 5000 Unit(s) SubCutaneous every 12 hours  metoprolol tartrate 100 milliGRAM(s) Oral two times a day      Vital Signs Last 24 Hrs  T(C): 36.4 (25 Feb 2025 09:18), Max: 36.7 (24 Feb 2025 15:40)  T(F): 97.5 (25 Feb 2025 09:18), Max: 98.1 (24 Feb 2025 15:40)  HR: 77 (25 Feb 2025 09:18) (74 - 79)  BP: 113/90 (25 Feb 2025 09:18) (104/74 - 134/97)  BP(mean): 98 (25 Feb 2025 09:18) (98 - 98)  RR: 18 (25 Feb 2025 09:18) (16 - 18)  SpO2: 97% (25 Feb 2025 09:18) (93% - 97%)    Parameters below as of 25 Feb 2025 09:18  Patient On (Oxygen Delivery Method): room air      Neurological exam:  HF: A x O x 3. Appropriately interactive.  Speech fluent, No Aphasia or paraphasic errors. Naming /repetition intact   CN: RICHELLE, EOMI, VFF, facial sensation normal, no NLFD, tongue midline, Palate moves equally  Motor: No pronator drift, Strength 5/5 in all 4 ext, normal bulk and tone, no tremors  Sens: Intact to light touch     Reflexes: Symmetric and normal,  downgoing toes b/l  Coord:  No FNFA, dysmetria, JAME intact   Gait/Balance: Normal    NIHSS: 0                          16.8   8.74  )-----------( 197      ( 25 Feb 2025 08:45 )             48.4     02-25    136  |  105  |  21  ----------------------------<  136[H]  3.5   |  27  |  1.15    Ca    9.4      25 Feb 2025 08:45  Mg     2.3     02-24    TPro  7.5  /  Alb  4.0  /  TBili  1.0  /  DBili  x   /  AST  29  /  ALT  50  /  AlkPhos  78  02-25 02-24 Chol 121 LDL 50 HDL 39[L] Trig 195[H]  A1c 5.9    Radiology report:    < from: CT Angio Neck w/ IV Cont (02.24.25 @ 06:23) >  IMPRESSION:    CT HEAD:  No acute intracranial hemorrhage, mass effect, or midline shift.    CTA NECK:  No evidence of significant stenosis or occlusion.    CTA HEAD:  No large vessel occlusion, significant stenosis or vascular abnormality   identified.    < from: TTE W or WO Ultrasound Enhancing Agent (02.24.25 @ 10:49) >  CONCLUSIONS:      1. Left ventricular systolic function is normal with an ejection fraction of 53 % by Martinez's method of disks.   2. Normal right ventricular systolic function.   3. Mild mitral regurgitation.   4. Agitated saline injection was negative for intracardiac shunt.

## 2025-02-25 NOTE — DISCHARGE NOTE NURSING/CASE MANAGEMENT/SOCIAL WORK - NSDCPETBCESMAN_GEN_ALL_CORE
ivf's complete. Iv removed. Discharge instructions reviewed with verbal recall and handouts provided. Waiting for  to return to leave unit. Instructed to call when he arrives.     0844  Ambulated off unit. nad.   If you are a smoker, it is important for your health to stop smoking. Please be aware that second hand smoke is also harmful.

## 2025-03-02 DIAGNOSIS — Z79.02 LONG TERM (CURRENT) USE OF ANTITHROMBOTICS/ANTIPLATELETS: ICD-10-CM

## 2025-03-02 DIAGNOSIS — I10 ESSENTIAL (PRIMARY) HYPERTENSION: ICD-10-CM

## 2025-03-02 DIAGNOSIS — Z90.49 ACQUIRED ABSENCE OF OTHER SPECIFIED PARTS OF DIGESTIVE TRACT: ICD-10-CM

## 2025-03-02 DIAGNOSIS — Z79.82 LONG TERM (CURRENT) USE OF ASPIRIN: ICD-10-CM

## 2025-03-02 DIAGNOSIS — R29.700 NIHSS SCORE 0: ICD-10-CM

## 2025-03-02 DIAGNOSIS — I25.10 ATHEROSCLEROTIC HEART DISEASE OF NATIVE CORONARY ARTERY WITHOUT ANGINA PECTORIS: ICD-10-CM

## 2025-03-02 DIAGNOSIS — I63.81 OTHER CEREBRAL INFARCTION DUE TO OCCLUSION OR STENOSIS OF SMALL ARTERY: ICD-10-CM

## 2025-03-02 DIAGNOSIS — N40.0 BENIGN PROSTATIC HYPERPLASIA WITHOUT LOWER URINARY TRACT SYMPTOMS: ICD-10-CM

## 2025-03-04 ENCOUNTER — APPOINTMENT (OUTPATIENT)
Dept: ORTHOPEDIC SURGERY | Facility: CLINIC | Age: 70
End: 2025-03-04
Payer: COMMERCIAL

## 2025-03-04 VITALS — WEIGHT: 215 LBS | BODY MASS INDEX: 29.12 KG/M2 | HEIGHT: 72 IN

## 2025-03-04 DIAGNOSIS — M25.872 OTHER SPECIFIED JOINT DISORDERS, LEFT ANKLE AND FOOT: ICD-10-CM

## 2025-03-04 DIAGNOSIS — Z00.00 ENCOUNTER FOR GENERAL ADULT MEDICAL EXAMINATION W/OUT ABNORMAL FINDINGS: ICD-10-CM

## 2025-03-04 PROCEDURE — 73660 X-RAY EXAM OF TOE(S): CPT | Mod: LT

## 2025-03-04 PROCEDURE — 99204 OFFICE O/P NEW MOD 45 MIN: CPT | Mod: 25

## 2025-03-04 RX ORDER — CLOPIDOGREL 75 MG/1
75 TABLET, FILM COATED ORAL
Refills: 0 | Status: ACTIVE | COMMUNITY

## 2025-04-09 ENCOUNTER — NON-APPOINTMENT (OUTPATIENT)
Age: 70
End: 2025-04-09

## 2025-04-09 ENCOUNTER — APPOINTMENT (OUTPATIENT)
Dept: RADIATION ONCOLOGY | Facility: CLINIC | Age: 70
End: 2025-04-09
Payer: COMMERCIAL

## 2025-04-09 VITALS
OXYGEN SATURATION: 96 % | HEART RATE: 91 BPM | RESPIRATION RATE: 16 BRPM | HEIGHT: 72 IN | SYSTOLIC BLOOD PRESSURE: 118 MMHG | WEIGHT: 215 LBS | DIASTOLIC BLOOD PRESSURE: 78 MMHG | BODY MASS INDEX: 29.12 KG/M2

## 2025-04-09 PROCEDURE — 99213 OFFICE O/P EST LOW 20 MIN: CPT

## 2025-04-09 RX ORDER — APIXABAN 5 MG/1
5 TABLET, FILM COATED ORAL
Refills: 0 | Status: ACTIVE | COMMUNITY
Start: 2025-04-09

## 2025-07-18 ENCOUNTER — APPOINTMENT (OUTPATIENT)
Dept: UROLOGY | Facility: CLINIC | Age: 70
End: 2025-07-18

## 2025-07-18 VITALS
OXYGEN SATURATION: 95 % | WEIGHT: 210 LBS | DIASTOLIC BLOOD PRESSURE: 77 MMHG | BODY MASS INDEX: 28.44 KG/M2 | HEIGHT: 72 IN | HEART RATE: 75 BPM | SYSTOLIC BLOOD PRESSURE: 131 MMHG | RESPIRATION RATE: 16 BRPM

## 2025-07-18 PROCEDURE — 99214 OFFICE O/P EST MOD 30 MIN: CPT

## (undated) DEVICE — VENODYNE/SCD SLEEVE CALF MEDIUM

## (undated) DEVICE — TUBING SUCTION NONCONDUCTIVE 6MM X 12FT

## (undated) DEVICE — SOL IRR BAG NS 0.9% 3000ML

## (undated) DEVICE — BASIN SET DOUBLE

## (undated) DEVICE — TUBING TUR 2 PRONG

## (undated) DEVICE — ELCTR GROUNDING PAD ADULT COVIDIEN

## (undated) DEVICE — DRAPE TOWEL BLUE 17" X 24"

## (undated) DEVICE — GOWN XL W TOWEL

## (undated) DEVICE — TRAP SPECIMEN SPUTUM 40CC

## (undated) DEVICE — Device

## (undated) DEVICE — DRAPE BACK TABLE COVER 44X90"

## (undated) DEVICE — POSITIONER STRAP ARMBOARD VELCRO TS-30

## (undated) DEVICE — SYR LUER LOK 20CC

## (undated) DEVICE — WARMING BLANKET UPPER ADULT

## (undated) DEVICE — LABELS BLANK W PEN

## (undated) DEVICE — GLV 7.5 PROTEXIS (WHITE)

## (undated) DEVICE — PREP BETADINE SPONGE STICKS